# Patient Record
Sex: MALE | Race: WHITE | NOT HISPANIC OR LATINO | Employment: OTHER | URBAN - NONMETROPOLITAN AREA
[De-identification: names, ages, dates, MRNs, and addresses within clinical notes are randomized per-mention and may not be internally consistent; named-entity substitution may affect disease eponyms.]

---

## 2017-01-01 ENCOUNTER — LAB (OUTPATIENT)
Dept: LAB | Facility: HOSPITAL | Age: 77
End: 2017-01-01

## 2017-01-01 ENCOUNTER — HOSPITAL ENCOUNTER (INPATIENT)
Facility: HOSPITAL | Age: 77
LOS: 14 days | End: 2017-03-07
Attending: EMERGENCY MEDICINE | Admitting: INTERNAL MEDICINE

## 2017-01-01 ENCOUNTER — OFFICE VISIT (OUTPATIENT)
Dept: CARDIOLOGY | Facility: CLINIC | Age: 77
End: 2017-01-01

## 2017-01-01 ENCOUNTER — APPOINTMENT (OUTPATIENT)
Dept: GENERAL RADIOLOGY | Facility: HOSPITAL | Age: 77
End: 2017-01-01

## 2017-01-01 ENCOUNTER — APPOINTMENT (OUTPATIENT)
Dept: ULTRASOUND IMAGING | Facility: HOSPITAL | Age: 77
End: 2017-01-01

## 2017-01-01 ENCOUNTER — HOSPITAL ENCOUNTER (INPATIENT)
Facility: HOSPITAL | Age: 77
LOS: 1 days | End: 2017-03-08
Attending: INTERNAL MEDICINE | Admitting: INTERNAL MEDICINE

## 2017-01-01 ENCOUNTER — APPOINTMENT (OUTPATIENT)
Dept: INTERVENTIONAL RADIOLOGY/VASCULAR | Facility: HOSPITAL | Age: 77
End: 2017-01-01
Attending: INTERNAL MEDICINE

## 2017-01-01 ENCOUNTER — ANTICOAGULATION VISIT (OUTPATIENT)
Dept: CARDIOLOGY | Facility: CLINIC | Age: 77
End: 2017-01-01

## 2017-01-01 ENCOUNTER — APPOINTMENT (OUTPATIENT)
Dept: CARDIOLOGY | Facility: HOSPITAL | Age: 77
End: 2017-01-01
Attending: INTERNAL MEDICINE

## 2017-01-01 ENCOUNTER — HOSPITAL ENCOUNTER (OUTPATIENT)
Dept: GENERAL RADIOLOGY | Facility: HOSPITAL | Age: 77
Discharge: HOME | End: 2017-01-30
Admitting: NURSE PRACTITIONER

## 2017-01-01 ENCOUNTER — HOSPITAL ENCOUNTER (OUTPATIENT)
Dept: GENERAL RADIOLOGY | Facility: HOSPITAL | Age: 77
Discharge: HOME | End: 2017-01-30

## 2017-01-01 ENCOUNTER — OFFICE VISIT (OUTPATIENT)
Dept: CARDIAC SURGERY | Facility: CLINIC | Age: 77
End: 2017-01-01

## 2017-01-01 VITALS
WEIGHT: 159 LBS | OXYGEN SATURATION: 92 % | BODY MASS INDEX: 24.1 KG/M2 | TEMPERATURE: 98.6 F | HEART RATE: 65 BPM | DIASTOLIC BLOOD PRESSURE: 72 MMHG | SYSTOLIC BLOOD PRESSURE: 125 MMHG | HEIGHT: 68 IN

## 2017-01-01 VITALS
BODY MASS INDEX: 27 KG/M2 | HEART RATE: 67 BPM | OXYGEN SATURATION: 96 % | DIASTOLIC BLOOD PRESSURE: 76 MMHG | TEMPERATURE: 97.2 F | SYSTOLIC BLOOD PRESSURE: 194 MMHG | WEIGHT: 168 LBS | RESPIRATION RATE: 24 BRPM | HEIGHT: 66 IN

## 2017-01-01 VITALS
WEIGHT: 158 LBS | OXYGEN SATURATION: 92 % | HEART RATE: 89 BPM | DIASTOLIC BLOOD PRESSURE: 72 MMHG | SYSTOLIC BLOOD PRESSURE: 118 MMHG | HEIGHT: 68 IN | BODY MASS INDEX: 23.95 KG/M2

## 2017-01-01 VITALS
TEMPERATURE: 96.9 F | RESPIRATION RATE: 28 BRPM | HEART RATE: 87 BPM | WEIGHT: 171.08 LBS | BODY MASS INDEX: 28.5 KG/M2 | HEIGHT: 65 IN | SYSTOLIC BLOOD PRESSURE: 76 MMHG | OXYGEN SATURATION: 77 % | DIASTOLIC BLOOD PRESSURE: 39 MMHG

## 2017-01-01 DIAGNOSIS — R26.89 IMPAIRED GAIT AND MOBILITY: ICD-10-CM

## 2017-01-01 DIAGNOSIS — N18.30 CONTROLLED TYPE 2 DIABETES MELLITUS WITH STAGE 3 CHRONIC KIDNEY DISEASE, WITHOUT LONG-TERM CURRENT USE OF INSULIN (HCC): ICD-10-CM

## 2017-01-01 DIAGNOSIS — J90 PLEURAL EFFUSION: ICD-10-CM

## 2017-01-01 DIAGNOSIS — Z74.09 IMPAIRED MOBILITY AND ADLS: ICD-10-CM

## 2017-01-01 DIAGNOSIS — R06.09 DYSPNEA ON EXERTION: ICD-10-CM

## 2017-01-01 DIAGNOSIS — E11.22 CKD STAGE 3 DUE TO TYPE 2 DIABETES MELLITUS (HCC): ICD-10-CM

## 2017-01-01 DIAGNOSIS — I48.19 PERSISTENT ATRIAL FIBRILLATION (HCC): Primary | ICD-10-CM

## 2017-01-01 DIAGNOSIS — I50.32 CHRONIC DIASTOLIC CONGESTIVE HEART FAILURE (HCC): ICD-10-CM

## 2017-01-01 DIAGNOSIS — I35.0 NONRHEUMATIC AORTIC VALVE STENOSIS: Primary | ICD-10-CM

## 2017-01-01 DIAGNOSIS — Z86.79 HISTORY OF ATRIAL FIBRILLATION: Primary | ICD-10-CM

## 2017-01-01 DIAGNOSIS — I10 ESSENTIAL HYPERTENSION: ICD-10-CM

## 2017-01-01 DIAGNOSIS — I73.9 PVD (PERIPHERAL VASCULAR DISEASE) (HCC): ICD-10-CM

## 2017-01-01 DIAGNOSIS — E11.22 CONTROLLED TYPE 2 DIABETES MELLITUS WITH STAGE 3 CHRONIC KIDNEY DISEASE, WITHOUT LONG-TERM CURRENT USE OF INSULIN (HCC): ICD-10-CM

## 2017-01-01 DIAGNOSIS — I48.19 PERSISTENT ATRIAL FIBRILLATION (HCC): ICD-10-CM

## 2017-01-01 DIAGNOSIS — Z78.9 IMPAIRED MOBILITY AND ADLS: ICD-10-CM

## 2017-01-01 DIAGNOSIS — I50.9 CONGESTIVE HEART FAILURE, UNSPECIFIED CONGESTIVE HEART FAILURE CHRONICITY, UNSPECIFIED CONGESTIVE HEART FAILURE TYPE: Primary | ICD-10-CM

## 2017-01-01 DIAGNOSIS — I10 BENIGN ESSENTIAL HTN: ICD-10-CM

## 2017-01-01 DIAGNOSIS — J90 UNSPECIFIED PLEURAL EFFUSION: Primary | ICD-10-CM

## 2017-01-01 DIAGNOSIS — Q23.0 CONGENITAL STENOSIS OF AORTIC VALVE: ICD-10-CM

## 2017-01-01 DIAGNOSIS — R00.1 BRADYCARDIA: ICD-10-CM

## 2017-01-01 DIAGNOSIS — Z74.09 IMPAIRED PHYSICAL MOBILITY: ICD-10-CM

## 2017-01-01 DIAGNOSIS — N18.30 CKD STAGE 3 DUE TO TYPE 2 DIABETES MELLITUS (HCC): ICD-10-CM

## 2017-01-01 LAB
ALBUMIN SERPL-MCNC: 3 G/DL (ref 3.4–4.8)
ALBUMIN SERPL-MCNC: 3.4 G/DL (ref 3.4–4.8)
ALBUMIN SERPL-MCNC: 3.5 G/DL (ref 3.4–4.8)
ALBUMIN SERPL-MCNC: 3.6 G/DL (ref 3.4–4.8)
ALBUMIN SERPL-MCNC: 3.6 G/DL (ref 3.4–4.8)
ALBUMIN/GLOB SERPL: 0.9 G/DL (ref 1.1–1.8)
ALBUMIN/GLOB SERPL: 1 G/DL (ref 1.1–1.8)
ALBUMIN/GLOB SERPL: 1.1 G/DL (ref 1.1–1.8)
ALBUMIN/GLOB SERPL: 1.3 G/DL (ref 1.1–1.8)
ALP SERPL-CCNC: 161 U/L (ref 38–126)
ALP SERPL-CCNC: 174 U/L (ref 38–126)
ALP SERPL-CCNC: 94 U/L (ref 38–126)
ALP SERPL-CCNC: 95 U/L (ref 38–126)
ALT SERPL W P-5'-P-CCNC: 44 U/L (ref 21–72)
ALT SERPL W P-5'-P-CCNC: 70 U/L (ref 21–72)
ALT SERPL W P-5'-P-CCNC: 79 U/L (ref 21–72)
ALT SERPL W P-5'-P-CCNC: 97 U/L (ref 21–72)
ANION GAP SERPL CALCULATED.3IONS-SCNC: 10 MMOL/L (ref 5–15)
ANION GAP SERPL CALCULATED.3IONS-SCNC: 11 MMOL/L (ref 5–15)
ANION GAP SERPL CALCULATED.3IONS-SCNC: 12 MMOL/L (ref 5–15)
ANION GAP SERPL CALCULATED.3IONS-SCNC: 6 MMOL/L (ref 5–15)
ANION GAP SERPL CALCULATED.3IONS-SCNC: 7 MMOL/L (ref 5–15)
ANION GAP SERPL CALCULATED.3IONS-SCNC: 8 MMOL/L (ref 5–15)
ANION GAP SERPL CALCULATED.3IONS-SCNC: 9 MMOL/L (ref 5–15)
ARTERIAL PATENCY WRIST A: ABNORMAL
ARTERIAL PATENCY WRIST A: ABNORMAL
AST SERPL-CCNC: 37 U/L (ref 17–59)
AST SERPL-CCNC: 37 U/L (ref 17–59)
AST SERPL-CCNC: 54 U/L (ref 17–59)
AST SERPL-CCNC: 65 U/L (ref 17–59)
ATMOSPHERIC PRESS: ABNORMAL MMHG
ATMOSPHERIC PRESS: ABNORMAL MMHG
BASE EXCESS BLDA CALC-SCNC: -1.5 MMOL/L (ref -2.4–2.4)
BASE EXCESS BLDA CALC-SCNC: 0.1 MMOL/L (ref -2.4–2.4)
BASOPHILS # BLD AUTO: 0 10*3/MM3 (ref 0–0.2)
BASOPHILS # BLD AUTO: 0.01 10*3/MM3 (ref 0–0.2)
BASOPHILS # BLD AUTO: 0.02 10*3/MM3 (ref 0–0.2)
BASOPHILS # BLD AUTO: 0.04 10*3/MM3 (ref 0–0.2)
BASOPHILS NFR BLD AUTO: 0 % (ref 0–2)
BASOPHILS NFR BLD AUTO: 0.2 % (ref 0–2)
BASOPHILS NFR BLD AUTO: 0.4 % (ref 0–2)
BASOPHILS NFR BLD AUTO: 0.7 % (ref 0–2)
BDY SITE: ABNORMAL
BDY SITE: ABNORMAL
BH CV ECHO MEAS - AO MAX PG: 17.4 MMHG
BH CV ECHO MEAS - AO V2 MAX: 208.8 CM/SEC
BH CV ECHO MEAS - RAP SYSTOLE: 15 MMHG
BH CV ECHO MEAS - RVSP: 52.9 MMHG
BH CV ECHO MEAS - TR MAX VEL: 307.8 CM/SEC
BILIRUB SERPL-MCNC: 0.5 MG/DL (ref 0.2–1.3)
BILIRUB SERPL-MCNC: 0.6 MG/DL (ref 0.2–1.3)
BILIRUB SERPL-MCNC: 0.6 MG/DL (ref 0.2–1.3)
BILIRUB SERPL-MCNC: 0.8 MG/DL (ref 0.2–1.3)
BUN BLD-MCNC: 100 MG/DL (ref 7–21)
BUN BLD-MCNC: 101 MG/DL (ref 7–21)
BUN BLD-MCNC: 103 MG/DL (ref 7–21)
BUN BLD-MCNC: 103 MG/DL (ref 7–21)
BUN BLD-MCNC: 33 MG/DL (ref 7–21)
BUN BLD-MCNC: 35 MG/DL (ref 7–21)
BUN BLD-MCNC: 39 MG/DL (ref 7–21)
BUN BLD-MCNC: 58 MG/DL (ref 7–21)
BUN BLD-MCNC: 68 MG/DL (ref 7–21)
BUN BLD-MCNC: 71 MG/DL (ref 7–21)
BUN BLD-MCNC: 79 MG/DL (ref 7–21)
BUN BLD-MCNC: 79 MG/DL (ref 7–21)
BUN BLD-MCNC: 85 MG/DL (ref 7–21)
BUN BLD-MCNC: 88 MG/DL (ref 7–21)
BUN BLD-MCNC: 92 MG/DL (ref 7–21)
BUN/CREAT SERPL: 18.2 (ref 7–25)
BUN/CREAT SERPL: 18.5 (ref 7–25)
BUN/CREAT SERPL: 18.8 (ref 7–25)
BUN/CREAT SERPL: 24.1 (ref 7–25)
BUN/CREAT SERPL: 30.5 (ref 7–25)
BUN/CREAT SERPL: 34 (ref 7–25)
BUN/CREAT SERPL: 39.1 (ref 7–25)
BUN/CREAT SERPL: 39.3 (ref 7–25)
BUN/CREAT SERPL: 41.3 (ref 7–25)
BUN/CREAT SERPL: 41.9 (ref 7–25)
BUN/CREAT SERPL: 44.4 (ref 7–25)
BUN/CREAT SERPL: 45.8 (ref 7–25)
BUN/CREAT SERPL: 46.3 (ref 7–25)
BUN/CREAT SERPL: 46.6 (ref 7–25)
BUN/CREAT SERPL: 47 (ref 7–25)
CA-I BLD-MCNC: 4.7 MG/DL (ref 4.5–4.9)
CA-I BLD-MCNC: 5 MG/DL (ref 4.5–4.9)
CALCIUM SPEC-SCNC: 8.6 MG/DL (ref 8.4–10.2)
CALCIUM SPEC-SCNC: 8.8 MG/DL (ref 8.4–10.2)
CALCIUM SPEC-SCNC: 8.9 MG/DL (ref 8.4–10.2)
CALCIUM SPEC-SCNC: 9 MG/DL (ref 8.4–10.2)
CALCIUM SPEC-SCNC: 9.1 MG/DL (ref 8.4–10.2)
CALCIUM SPEC-SCNC: 9.1 MG/DL (ref 8.4–10.2)
CALCIUM SPEC-SCNC: 9.3 MG/DL (ref 8.4–10.2)
CALCIUM SPEC-SCNC: 9.4 MG/DL (ref 8.4–10.2)
CALCIUM SPEC-SCNC: 9.5 MG/DL (ref 8.4–10.2)
CALCIUM SPEC-SCNC: 9.6 MG/DL (ref 8.4–10.2)
CALCIUM SPEC-SCNC: 9.7 MG/DL (ref 8.4–10.2)
CHLORIDE SERPL-SCNC: 100 MMOL/L (ref 95–110)
CHLORIDE SERPL-SCNC: 101 MMOL/L (ref 95–110)
CHLORIDE SERPL-SCNC: 102 MMOL/L (ref 95–110)
CHLORIDE SERPL-SCNC: 103 MMOL/L (ref 95–110)
CHLORIDE SERPL-SCNC: 103 MMOL/L (ref 95–110)
CHLORIDE SERPL-SCNC: 109 MMOL/L (ref 95–110)
CHLORIDE SERPL-SCNC: 109 MMOL/L (ref 95–110)
CHLORIDE SERPL-SCNC: 98 MMOL/L (ref 95–110)
CHLORIDE SERPL-SCNC: 99 MMOL/L (ref 95–110)
CO2 BLDA-SCNC: 22.7 MMOL/L (ref 23–27)
CO2 BLDA-SCNC: 25.4 MMOL/L (ref 23–27)
CO2 SERPL-SCNC: 23 MMOL/L (ref 22–31)
CO2 SERPL-SCNC: 24 MMOL/L (ref 22–31)
CO2 SERPL-SCNC: 24 MMOL/L (ref 22–31)
CO2 SERPL-SCNC: 25 MMOL/L (ref 22–31)
CO2 SERPL-SCNC: 25 MMOL/L (ref 22–31)
CO2 SERPL-SCNC: 26 MMOL/L (ref 22–31)
CO2 SERPL-SCNC: 27 MMOL/L (ref 22–31)
CO2 SERPL-SCNC: 28 MMOL/L (ref 22–31)
CO2 SERPL-SCNC: 29 MMOL/L (ref 22–31)
CO2 SERPL-SCNC: 29 MMOL/L (ref 22–31)
CREAT BLD-MCNC: 1.76 MG/DL (ref 0.7–1.3)
CREAT BLD-MCNC: 1.92 MG/DL (ref 0.7–1.3)
CREAT BLD-MCNC: 2.01 MG/DL (ref 0.7–1.3)
CREAT BLD-MCNC: 2.02 MG/DL (ref 0.7–1.3)
CREAT BLD-MCNC: 2.06 MG/DL (ref 0.7–1.3)
CREAT BLD-MCNC: 2.07 MG/DL (ref 0.7–1.3)
CREAT BLD-MCNC: 2.09 MG/DL (ref 0.7–1.3)
CREAT BLD-MCNC: 2.1 MG/DL (ref 0.7–1.3)
CREAT BLD-MCNC: 2.11 MG/DL (ref 0.7–1.3)
CREAT BLD-MCNC: 2.15 MG/DL (ref 0.7–1.3)
CREAT BLD-MCNC: 2.16 MG/DL (ref 0.7–1.3)
CREAT BLD-MCNC: 2.21 MG/DL (ref 0.7–1.3)
CREAT BLD-MCNC: 2.23 MG/DL (ref 0.7–1.3)
CREAT BLD-MCNC: 2.25 MG/DL (ref 0.7–1.3)
CREAT BLD-MCNC: 2.41 MG/DL (ref 0.7–1.3)
CREAT UR-MCNC: 39.5 MG/DL
DEPRECATED RDW RBC AUTO: 51.4 FL (ref 35.1–43.9)
DEPRECATED RDW RBC AUTO: 51.7 FL (ref 35.1–43.9)
DEPRECATED RDW RBC AUTO: 52.1 FL (ref 35.1–43.9)
DEPRECATED RDW RBC AUTO: 52.5 FL (ref 35.1–43.9)
DEPRECATED RDW RBC AUTO: 52.6 FL (ref 35.1–43.9)
DEPRECATED RDW RBC AUTO: 52.7 FL (ref 35.1–43.9)
DEPRECATED RDW RBC AUTO: 52.7 FL (ref 35.1–43.9)
DEPRECATED RDW RBC AUTO: 53 FL (ref 35.1–43.9)
DEPRECATED RDW RBC AUTO: 53.2 FL (ref 35.1–43.9)
DEPRECATED RDW RBC AUTO: 54.3 FL (ref 35.1–43.9)
DEPRECATED RDW RBC AUTO: 54.4 FL (ref 35.1–43.9)
DEPRECATED RDW RBC AUTO: 54.5 FL (ref 35.1–43.9)
DEPRECATED RDW RBC AUTO: 55.1 FL (ref 35.1–43.9)
DEPRECATED RDW RBC AUTO: 55.7 FL (ref 35.1–43.9)
EOSINOPHIL # BLD AUTO: 0 10*3/MM3 (ref 0–0.7)
EOSINOPHIL # BLD AUTO: 0.01 10*3/MM3 (ref 0–0.7)
EOSINOPHIL # BLD AUTO: 0.05 10*3/MM3 (ref 0–0.7)
EOSINOPHIL # BLD AUTO: 0.05 10*3/MM3 (ref 0–0.7)
EOSINOPHIL NFR BLD AUTO: 0 % (ref 0–7)
EOSINOPHIL NFR BLD AUTO: 0.1 % (ref 0–7)
EOSINOPHIL NFR BLD AUTO: 0.8 % (ref 0–7)
EOSINOPHIL NFR BLD AUTO: 0.9 % (ref 0–7)
ERYTHROCYTE [DISTWIDTH] IN BLOOD BY AUTOMATED COUNT: 17.3 % (ref 11.5–14.5)
ERYTHROCYTE [DISTWIDTH] IN BLOOD BY AUTOMATED COUNT: 17.3 % (ref 11.5–14.5)
ERYTHROCYTE [DISTWIDTH] IN BLOOD BY AUTOMATED COUNT: 17.4 % (ref 11.5–14.5)
ERYTHROCYTE [DISTWIDTH] IN BLOOD BY AUTOMATED COUNT: 17.4 % (ref 11.5–14.5)
ERYTHROCYTE [DISTWIDTH] IN BLOOD BY AUTOMATED COUNT: 17.5 % (ref 11.5–14.5)
ERYTHROCYTE [DISTWIDTH] IN BLOOD BY AUTOMATED COUNT: 17.6 % (ref 11.5–14.5)
ERYTHROCYTE [DISTWIDTH] IN BLOOD BY AUTOMATED COUNT: 17.7 % (ref 11.5–14.5)
ERYTHROCYTE [DISTWIDTH] IN BLOOD BY AUTOMATED COUNT: 17.8 % (ref 11.5–14.5)
ERYTHROCYTE [DISTWIDTH] IN BLOOD BY AUTOMATED COUNT: 18 % (ref 11.5–14.5)
ERYTHROCYTE [DISTWIDTH] IN BLOOD BY AUTOMATED COUNT: 18.1 % (ref 11.5–14.5)
ERYTHROCYTE [DISTWIDTH] IN BLOOD BY AUTOMATED COUNT: 18.2 % (ref 11.5–14.5)
ERYTHROCYTE [DISTWIDTH] IN BLOOD BY AUTOMATED COUNT: 18.5 % (ref 11.5–14.5)
GFR SERPL CREATININE-BSD FRML MDRD: 26 ML/MIN/1.73 (ref 60–98)
GFR SERPL CREATININE-BSD FRML MDRD: 28 ML/MIN/1.73 (ref 60–98)
GFR SERPL CREATININE-BSD FRML MDRD: 29 ML/MIN/1.73 (ref 42–98)
GFR SERPL CREATININE-BSD FRML MDRD: 29 ML/MIN/1.73 (ref 60–98)
GFR SERPL CREATININE-BSD FRML MDRD: 30 ML/MIN/1.73 (ref 42–98)
GFR SERPL CREATININE-BSD FRML MDRD: 30 ML/MIN/1.73 (ref 60–98)
GFR SERPL CREATININE-BSD FRML MDRD: 31 ML/MIN/1.73 (ref 42–98)
GFR SERPL CREATININE-BSD FRML MDRD: 31 ML/MIN/1.73 (ref 60–98)
GFR SERPL CREATININE-BSD FRML MDRD: 32 ML/MIN/1.73 (ref 60–98)
GFR SERPL CREATININE-BSD FRML MDRD: 32 ML/MIN/1.73 (ref 60–98)
GFR SERPL CREATININE-BSD FRML MDRD: 34 ML/MIN/1.73 (ref 42–98)
GFR SERPL CREATININE-BSD FRML MDRD: 38 ML/MIN/1.73 (ref 42–98)
GLOBULIN UR ELPH-MCNC: 2.7 GM/DL (ref 2.3–3.5)
GLOBULIN UR ELPH-MCNC: 2.8 GM/DL (ref 2.3–3.5)
GLOBULIN UR ELPH-MCNC: 3.5 GM/DL (ref 2.3–3.5)
GLOBULIN UR ELPH-MCNC: 3.8 GM/DL (ref 2.3–3.5)
GLUCOSE BLD-MCNC: 113 MG/DL (ref 60–100)
GLUCOSE BLD-MCNC: 115 MG/DL (ref 60–100)
GLUCOSE BLD-MCNC: 126 MG/DL (ref 60–100)
GLUCOSE BLD-MCNC: 127 MG/DL (ref 60–100)
GLUCOSE BLD-MCNC: 130 MG/DL (ref 60–100)
GLUCOSE BLD-MCNC: 136 MG/DL (ref 60–100)
GLUCOSE BLD-MCNC: 160 MG/DL (ref 60–100)
GLUCOSE BLD-MCNC: 192 MG/DL (ref 60–100)
GLUCOSE BLD-MCNC: 192 MG/DL (ref 60–100)
GLUCOSE BLD-MCNC: 196 MG/DL (ref 60–100)
GLUCOSE BLD-MCNC: 220 MG/DL (ref 60–100)
GLUCOSE BLD-MCNC: 273 MG/DL (ref 60–100)
GLUCOSE BLD-MCNC: 277 MG/DL (ref 60–100)
GLUCOSE BLD-MCNC: 36 MG/DL (ref 60–100)
GLUCOSE BLD-MCNC: 95 MG/DL (ref 60–100)
GLUCOSE BLDA-MCNC: 119 MMOL/L
GLUCOSE BLDA-MCNC: 128 MMOL/L
GLUCOSE BLDC GLUCOMTR-MCNC: 108 MG/DL (ref 70–130)
GLUCOSE BLDC GLUCOMTR-MCNC: 115 MG/DL (ref 70–130)
GLUCOSE BLDC GLUCOMTR-MCNC: 116 MG/DL (ref 70–130)
GLUCOSE BLDC GLUCOMTR-MCNC: 138 MG/DL (ref 70–130)
GLUCOSE BLDC GLUCOMTR-MCNC: 140 MG/DL (ref 70–130)
GLUCOSE BLDC GLUCOMTR-MCNC: 148 MG/DL (ref 70–130)
GLUCOSE BLDC GLUCOMTR-MCNC: 148 MG/DL (ref 70–130)
GLUCOSE BLDC GLUCOMTR-MCNC: 152 MG/DL (ref 70–130)
GLUCOSE BLDC GLUCOMTR-MCNC: 174 MG/DL (ref 70–130)
GLUCOSE BLDC GLUCOMTR-MCNC: 175 MG/DL (ref 70–130)
GLUCOSE BLDC GLUCOMTR-MCNC: 176 MG/DL (ref 70–130)
GLUCOSE BLDC GLUCOMTR-MCNC: 184 MG/DL (ref 70–130)
GLUCOSE BLDC GLUCOMTR-MCNC: 189 MG/DL (ref 70–130)
GLUCOSE BLDC GLUCOMTR-MCNC: 206 MG/DL (ref 70–130)
GLUCOSE BLDC GLUCOMTR-MCNC: 209 MG/DL (ref 70–130)
GLUCOSE BLDC GLUCOMTR-MCNC: 219 MG/DL (ref 70–130)
GLUCOSE BLDC GLUCOMTR-MCNC: 237 MG/DL (ref 70–130)
GLUCOSE BLDC GLUCOMTR-MCNC: 254 MG/DL (ref 70–130)
GLUCOSE BLDC GLUCOMTR-MCNC: 257 MG/DL (ref 70–130)
GLUCOSE BLDC GLUCOMTR-MCNC: 260 MG/DL (ref 70–130)
GLUCOSE BLDC GLUCOMTR-MCNC: 273 MG/DL (ref 70–130)
GLUCOSE BLDC GLUCOMTR-MCNC: 279 MG/DL (ref 70–130)
GLUCOSE BLDC GLUCOMTR-MCNC: 288 MG/DL (ref 70–130)
GLUCOSE BLDC GLUCOMTR-MCNC: 290 MG/DL (ref 70–130)
GLUCOSE BLDC GLUCOMTR-MCNC: 292 MG/DL (ref 70–130)
GLUCOSE BLDC GLUCOMTR-MCNC: 293 MG/DL (ref 70–130)
GLUCOSE BLDC GLUCOMTR-MCNC: 293 MG/DL (ref 70–130)
GLUCOSE BLDC GLUCOMTR-MCNC: 297 MG/DL (ref 70–130)
GLUCOSE BLDC GLUCOMTR-MCNC: 303 MG/DL (ref 70–130)
GLUCOSE BLDC GLUCOMTR-MCNC: 305 MG/DL (ref 70–130)
GLUCOSE BLDC GLUCOMTR-MCNC: 307 MG/DL (ref 70–130)
GLUCOSE BLDC GLUCOMTR-MCNC: 313 MG/DL (ref 70–130)
GLUCOSE BLDC GLUCOMTR-MCNC: 316 MG/DL (ref 70–130)
GLUCOSE BLDC GLUCOMTR-MCNC: 322 MG/DL (ref 70–130)
GLUCOSE BLDC GLUCOMTR-MCNC: 327 MG/DL (ref 70–130)
GLUCOSE BLDC GLUCOMTR-MCNC: 330 MG/DL (ref 70–130)
GLUCOSE BLDC GLUCOMTR-MCNC: 336 MG/DL (ref 70–130)
GLUCOSE BLDC GLUCOMTR-MCNC: 339 MG/DL (ref 70–130)
GLUCOSE BLDC GLUCOMTR-MCNC: 340 MG/DL (ref 70–130)
GLUCOSE BLDC GLUCOMTR-MCNC: 345 MG/DL (ref 70–130)
GLUCOSE BLDC GLUCOMTR-MCNC: 352 MG/DL (ref 70–130)
GLUCOSE BLDC GLUCOMTR-MCNC: 359 MG/DL (ref 70–130)
GLUCOSE BLDC GLUCOMTR-MCNC: 376 MG/DL (ref 70–130)
GLUCOSE BLDC GLUCOMTR-MCNC: 378 MG/DL (ref 70–130)
GLUCOSE BLDC GLUCOMTR-MCNC: 381 MG/DL (ref 70–130)
GLUCOSE BLDC GLUCOMTR-MCNC: 411 MG/DL (ref 70–130)
GLUCOSE BLDC GLUCOMTR-MCNC: 415 MG/DL (ref 70–130)
GLUCOSE BLDC GLUCOMTR-MCNC: 415 MG/DL (ref 70–130)
GLUCOSE BLDC GLUCOMTR-MCNC: 465 MG/DL (ref 70–130)
GLUCOSE BLDC GLUCOMTR-MCNC: 65 MG/DL (ref 70–130)
GLUCOSE BLDC GLUCOMTR-MCNC: 91 MG/DL (ref 70–130)
GLUCOSE BLDC GLUCOMTR-MCNC: 94 MG/DL (ref 70–130)
HANSEL STAIN: NEGATIVE
HCO3 BLDA-SCNC: 21.7 MMOL/L (ref 22–26)
HCO3 BLDA-SCNC: 24.2 MMOL/L (ref 22–26)
HCT VFR BLD AUTO: 33.3 % (ref 39–49)
HCT VFR BLD AUTO: 34.1 % (ref 39–49)
HCT VFR BLD AUTO: 34.3 % (ref 39–49)
HCT VFR BLD AUTO: 34.7 % (ref 39–49)
HCT VFR BLD AUTO: 34.8 % (ref 39–49)
HCT VFR BLD AUTO: 35.2 % (ref 39–49)
HCT VFR BLD AUTO: 35.3 % (ref 39–49)
HCT VFR BLD AUTO: 35.9 % (ref 39–49)
HCT VFR BLD AUTO: 36 % (ref 39–49)
HCT VFR BLD AUTO: 36.1 % (ref 39–49)
HCT VFR BLD AUTO: 36.4 % (ref 39–49)
HCT VFR BLD AUTO: 36.5 % (ref 39–49)
HCT VFR BLD AUTO: 38 % (ref 39–49)
HCT VFR BLD AUTO: 38.4 % (ref 39–49)
HCT VFR BLD CALC: 38 % (ref 40–54)
HCT VFR BLD CALC: 41 % (ref 40–54)
HGB BLD-MCNC: 11.3 G/DL (ref 13.7–17.3)
HGB BLD-MCNC: 11.5 G/DL (ref 13.7–17.3)
HGB BLD-MCNC: 11.6 G/DL (ref 13.7–17.3)
HGB BLD-MCNC: 11.7 G/DL (ref 13.7–17.3)
HGB BLD-MCNC: 11.9 G/DL (ref 13.7–17.3)
HGB BLD-MCNC: 12 G/DL (ref 13.7–17.3)
HGB BLD-MCNC: 12 G/DL (ref 13.7–17.3)
HGB BLD-MCNC: 12.1 G/DL (ref 13.7–17.3)
HGB BLD-MCNC: 12.1 G/DL (ref 13.7–17.3)
HGB BLD-MCNC: 12.4 G/DL (ref 13.7–17.3)
HGB BLD-MCNC: 12.5 G/DL (ref 13.7–17.3)
HGB BLD-MCNC: 12.5 G/DL (ref 13.7–17.3)
HGB BLD-MCNC: 12.6 G/DL (ref 13.7–17.3)
HGB BLD-MCNC: 12.7 G/DL (ref 13.7–17.3)
HGB BLDA-MCNC: 12.9 G/DL (ref 14–18)
HGB BLDA-MCNC: 14 G/DL (ref 14–18)
HOLD SPECIMEN: NORMAL
HOLD SPECIMEN: NORMAL
IMM GRANULOCYTES # BLD: 0.01 10*3/MM3 (ref 0–0.02)
IMM GRANULOCYTES # BLD: 0.02 10*3/MM3 (ref 0–0.02)
IMM GRANULOCYTES # BLD: 0.03 10*3/MM3 (ref 0–0.02)
IMM GRANULOCYTES # BLD: 0.03 10*3/MM3 (ref 0–0.02)
IMM GRANULOCYTES NFR BLD: 0.1 % (ref 0–0.5)
IMM GRANULOCYTES NFR BLD: 0.2 % (ref 0–0.5)
IMM GRANULOCYTES NFR BLD: 0.3 % (ref 0–0.5)
IMM GRANULOCYTES NFR BLD: 0.3 % (ref 0–0.5)
INR PPP: 1.5 (ref 0.8–1.2)
INR PPP: 1.77 (ref 0.8–1.2)
INR PPP: 1.78 (ref 0.8–1.2)
INR PPP: 1.96 (ref 0.8–1.2)
INR PPP: 2 (ref 0.8–1.2)
INR PPP: 2.15 (ref 0.8–1.2)
INR PPP: 2.28 (ref 0.8–1.2)
INR PPP: 2.29 (ref 0.8–1.2)
INR PPP: 2.41 (ref 0.8–1.2)
INR PPP: 2.6 (ref 0.8–1.2)
INR PPP: 2.8 (ref 0.8–1.2)
INR PPP: 2.87 (ref 0.8–1.2)
INR PPP: 3.36 (ref 0.8–1.2)
INR PPP: 3.37 (ref 0.8–1.2)
INR PPP: 3.5
INR PPP: 3.5
INR PPP: 3.76 (ref 0.8–1.2)
INR PPP: 4.08 (ref 0.8–1.2)
INR PPP: 4.77 (ref 0.8–1.2)
INR PPP: 6.83 (ref 0.8–1.2)
LARGE PLATELETS: NORMAL
LV EF 2D ECHO EST: 55 %
LYMPHOCYTES # BLD AUTO: 0.25 10*3/MM3 (ref 0.6–4.2)
LYMPHOCYTES # BLD AUTO: 0.29 10*3/MM3 (ref 0.6–4.2)
LYMPHOCYTES # BLD AUTO: 0.32 10*3/MM3 (ref 0.6–4.2)
LYMPHOCYTES # BLD AUTO: 0.32 10*3/MM3 (ref 0.6–4.2)
LYMPHOCYTES # BLD AUTO: 0.34 10*3/MM3 (ref 0.6–4.2)
LYMPHOCYTES # BLD AUTO: 0.36 10*3/MM3 (ref 0.6–4.2)
LYMPHOCYTES # BLD AUTO: 0.37 10*3/MM3 (ref 0.6–4.2)
LYMPHOCYTES # BLD AUTO: 0.44 10*3/MM3 (ref 0.6–4.2)
LYMPHOCYTES # BLD AUTO: 0.46 10*3/MM3 (ref 0.6–4.2)
LYMPHOCYTES # BLD AUTO: 0.5 10*3/MM3 (ref 0.6–4.2)
LYMPHOCYTES # BLD AUTO: 0.6 10*3/MM3 (ref 0.6–4.2)
LYMPHOCYTES # BLD AUTO: 0.78 10*3/MM3 (ref 0.6–4.2)
LYMPHOCYTES # BLD AUTO: 0.81 10*3/MM3 (ref 0.6–4.2)
LYMPHOCYTES NFR BLD AUTO: 13.3 % (ref 10–50)
LYMPHOCYTES NFR BLD AUTO: 13.9 % (ref 10–50)
LYMPHOCYTES NFR BLD AUTO: 2.7 % (ref 10–50)
LYMPHOCYTES NFR BLD AUTO: 3 % (ref 10–50)
LYMPHOCYTES NFR BLD AUTO: 3.2 % (ref 10–50)
LYMPHOCYTES NFR BLD AUTO: 3.3 % (ref 10–50)
LYMPHOCYTES NFR BLD AUTO: 4.1 % (ref 10–50)
LYMPHOCYTES NFR BLD AUTO: 4.3 % (ref 10–50)
LYMPHOCYTES NFR BLD AUTO: 4.9 % (ref 10–50)
LYMPHOCYTES NFR BLD AUTO: 5.4 % (ref 10–50)
LYMPHOCYTES NFR BLD AUTO: 6.2 % (ref 10–50)
LYMPHOCYTES NFR BLD AUTO: 6.5 % (ref 10–50)
LYMPHOCYTES NFR BLD AUTO: 8.2 % (ref 10–50)
MAGNESIUM SERPL-MCNC: 2.5 MG/DL (ref 1.6–2.3)
MAGNESIUM SERPL-MCNC: 2.5 MG/DL (ref 1.6–2.3)
MAGNESIUM SERPL-MCNC: 2.6 MG/DL (ref 1.6–2.3)
MAGNESIUM SERPL-MCNC: 2.7 MG/DL (ref 1.6–2.3)
MCH RBC QN AUTO: 27.2 PG (ref 26.5–34)
MCH RBC QN AUTO: 27.4 PG (ref 26.5–34)
MCH RBC QN AUTO: 27.5 PG (ref 26.5–34)
MCH RBC QN AUTO: 27.6 PG (ref 26.5–34)
MCH RBC QN AUTO: 27.6 PG (ref 26.5–34)
MCH RBC QN AUTO: 27.7 PG (ref 26.5–34)
MCH RBC QN AUTO: 27.7 PG (ref 26.5–34)
MCH RBC QN AUTO: 27.8 PG (ref 26.5–34)
MCH RBC QN AUTO: 27.9 PG (ref 26.5–34)
MCH RBC QN AUTO: 27.9 PG (ref 26.5–34)
MCH RBC QN AUTO: 28 PG (ref 26.5–34)
MCH RBC QN AUTO: 28.3 PG (ref 26.5–34)
MCHC RBC AUTO-ENTMCNC: 33.1 G/DL (ref 31.5–36.3)
MCHC RBC AUTO-ENTMCNC: 33.2 G/DL (ref 31.5–36.3)
MCHC RBC AUTO-ENTMCNC: 33.4 G/DL (ref 31.5–36.3)
MCHC RBC AUTO-ENTMCNC: 33.6 G/DL (ref 31.5–36.3)
MCHC RBC AUTO-ENTMCNC: 33.7 G/DL (ref 31.5–36.3)
MCHC RBC AUTO-ENTMCNC: 33.9 G/DL (ref 31.5–36.3)
MCHC RBC AUTO-ENTMCNC: 34.1 G/DL (ref 31.5–36.3)
MCHC RBC AUTO-ENTMCNC: 34.1 G/DL (ref 31.5–36.3)
MCHC RBC AUTO-ENTMCNC: 34.2 G/DL (ref 31.5–36.3)
MCHC RBC AUTO-ENTMCNC: 34.3 G/DL (ref 31.5–36.3)
MCHC RBC AUTO-ENTMCNC: 34.3 G/DL (ref 31.5–36.3)
MCHC RBC AUTO-ENTMCNC: 34.5 G/DL (ref 31.5–36.3)
MCV RBC AUTO: 80.9 FL (ref 80–98)
MCV RBC AUTO: 81.1 FL (ref 80–98)
MCV RBC AUTO: 81.1 FL (ref 80–98)
MCV RBC AUTO: 81.3 FL (ref 80–98)
MCV RBC AUTO: 81.4 FL (ref 80–98)
MCV RBC AUTO: 81.5 FL (ref 80–98)
MCV RBC AUTO: 81.5 FL (ref 80–98)
MCV RBC AUTO: 81.6 FL (ref 80–98)
MCV RBC AUTO: 81.8 FL (ref 80–98)
MCV RBC AUTO: 82.4 FL (ref 80–98)
MCV RBC AUTO: 82.6 FL (ref 80–98)
MCV RBC AUTO: 82.7 FL (ref 80–98)
MCV RBC AUTO: 83.3 FL (ref 80–98)
MCV RBC AUTO: 83.5 FL (ref 80–98)
MODALITY: ABNORMAL
MODALITY: ABNORMAL
MONOCYTES # BLD AUTO: 0.02 10*3/MM3 (ref 0–0.9)
MONOCYTES # BLD AUTO: 0.05 10*3/MM3 (ref 0–0.9)
MONOCYTES # BLD AUTO: 0.12 10*3/MM3 (ref 0–0.9)
MONOCYTES # BLD AUTO: 0.19 10*3/MM3 (ref 0–0.9)
MONOCYTES # BLD AUTO: 0.22 10*3/MM3 (ref 0–0.9)
MONOCYTES # BLD AUTO: 0.25 10*3/MM3 (ref 0–0.9)
MONOCYTES # BLD AUTO: 0.3 10*3/MM3 (ref 0–0.9)
MONOCYTES # BLD AUTO: 0.31 10*3/MM3 (ref 0–0.9)
MONOCYTES # BLD AUTO: 0.34 10*3/MM3 (ref 0–0.9)
MONOCYTES # BLD AUTO: 0.39 10*3/MM3 (ref 0–0.9)
MONOCYTES # BLD AUTO: 0.41 10*3/MM3 (ref 0–0.9)
MONOCYTES # BLD AUTO: 0.54 10*3/MM3 (ref 0–0.9)
MONOCYTES # BLD AUTO: 0.63 10*3/MM3 (ref 0–0.9)
MONOCYTES NFR BLD AUTO: 0.2 % (ref 0–12)
MONOCYTES NFR BLD AUTO: 1.1 % (ref 0–12)
MONOCYTES NFR BLD AUTO: 1.2 % (ref 0–12)
MONOCYTES NFR BLD AUTO: 2.7 % (ref 0–12)
MONOCYTES NFR BLD AUTO: 2.8 % (ref 0–12)
MONOCYTES NFR BLD AUTO: 2.8 % (ref 0–12)
MONOCYTES NFR BLD AUTO: 3.2 % (ref 0–12)
MONOCYTES NFR BLD AUTO: 3.5 % (ref 0–12)
MONOCYTES NFR BLD AUTO: 3.7 % (ref 0–12)
MONOCYTES NFR BLD AUTO: 4 % (ref 0–12)
MONOCYTES NFR BLD AUTO: 6.3 % (ref 0–12)
MONOCYTES NFR BLD AUTO: 6.7 % (ref 0–12)
MONOCYTES NFR BLD AUTO: 9.6 % (ref 0–12)
NEUTROPHILS # BLD AUTO: 4.06 10*3/MM3 (ref 2–8.6)
NEUTROPHILS # BLD AUTO: 4.21 10*3/MM3 (ref 2–8.6)
NEUTROPHILS # BLD AUTO: 4.77 10*3/MM3 (ref 2–8.6)
NEUTROPHILS # BLD AUTO: 6.39 10*3/MM3 (ref 2–8.6)
NEUTROPHILS # BLD AUTO: 7.25 10*3/MM3 (ref 2–8.6)
NEUTROPHILS # BLD AUTO: 8.16 10*3/MM3 (ref 2–8.6)
NEUTROPHILS # BLD AUTO: 8.19 10*3/MM3 (ref 2–8.6)
NEUTROPHILS # BLD AUTO: 8.49 10*3/MM3 (ref 2–8.6)
NEUTROPHILS # BLD AUTO: 8.54 10*3/MM3 (ref 2–8.6)
NEUTROPHILS # BLD AUTO: 9.02 10*3/MM3 (ref 2–8.6)
NEUTROPHILS # BLD AUTO: 9.1 10*3/MM3 (ref 2–8.6)
NEUTROPHILS # BLD AUTO: 9.1 10*3/MM3 (ref 2–8.6)
NEUTROPHILS # BLD AUTO: 9.37 10*3/MM3 (ref 2–8.6)
NEUTROPHILS NFR BLD AUTO: 75 % (ref 37–80)
NEUTROPHILS NFR BLD AUTO: 78.3 % (ref 37–80)
NEUTROPHILS NFR BLD AUTO: 90.3 % (ref 37–80)
NEUTROPHILS NFR BLD AUTO: 90.3 % (ref 37–80)
NEUTROPHILS NFR BLD AUTO: 90.7 % (ref 37–80)
NEUTROPHILS NFR BLD AUTO: 91 % (ref 37–80)
NEUTROPHILS NFR BLD AUTO: 91.4 % (ref 37–80)
NEUTROPHILS NFR BLD AUTO: 92.7 % (ref 37–80)
NEUTROPHILS NFR BLD AUTO: 92.8 % (ref 37–80)
NEUTROPHILS NFR BLD AUTO: 92.9 % (ref 37–80)
NEUTROPHILS NFR BLD AUTO: 93.4 % (ref 37–80)
NEUTROPHILS NFR BLD AUTO: 93.5 % (ref 37–80)
NEUTROPHILS NFR BLD AUTO: 95.3 % (ref 37–80)
NRBC BLD MANUAL-RTO: 0 /100 WBC (ref 0–0)
NT-PROBNP SERPL-MCNC: 7740 PG/ML (ref 0–1800)
PCO2 BLDA: 32.3 MM HG (ref 35–45)
PCO2 BLDA: 37.4 MM HG (ref 35–45)
PH BLDA: 7.43 PH UNITS (ref 7.35–7.45)
PH BLDA: 7.45 PH UNITS (ref 7.35–7.45)
PHOSPHATE SERPL-MCNC: 4.9 MG/DL (ref 2.4–4.4)
PLATELET # BLD AUTO: 152 10*3/MM3 (ref 150–450)
PLATELET # BLD AUTO: 159 10*3/MM3 (ref 150–450)
PLATELET # BLD AUTO: 171 10*3/MM3 (ref 150–450)
PLATELET # BLD AUTO: 171 10*3/MM3 (ref 150–450)
PLATELET # BLD AUTO: 188 10*3/MM3 (ref 150–450)
PLATELET # BLD AUTO: 192 10*3/MM3 (ref 150–450)
PLATELET # BLD AUTO: 193 10*3/MM3 (ref 150–450)
PLATELET # BLD AUTO: 201 10*3/MM3 (ref 150–450)
PLATELET # BLD AUTO: 205 10*3/MM3 (ref 150–450)
PLATELET # BLD AUTO: 207 10*3/MM3 (ref 150–450)
PLATELET # BLD AUTO: 207 10*3/MM3 (ref 150–450)
PLATELET # BLD AUTO: 219 10*3/MM3 (ref 150–450)
PLATELET # BLD AUTO: 220 10*3/MM3 (ref 150–450)
PLATELET # BLD AUTO: 226 10*3/MM3 (ref 150–450)
PMV BLD AUTO: 10 FL (ref 8–12)
PMV BLD AUTO: 10.1 FL (ref 8–12)
PMV BLD AUTO: 10.2 FL (ref 8–12)
PMV BLD AUTO: 10.2 FL (ref 8–12)
PMV BLD AUTO: 10.3 FL (ref 8–12)
PMV BLD AUTO: 10.3 FL (ref 8–12)
PMV BLD AUTO: 10.6 FL (ref 8–12)
PMV BLD AUTO: 10.7 FL (ref 8–12)
PMV BLD AUTO: 10.9 FL (ref 8–12)
PMV BLD AUTO: 11 FL (ref 8–12)
PMV BLD AUTO: 11.3 FL (ref 8–12)
PMV BLD AUTO: 9.6 FL (ref 8–12)
PMV BLD AUTO: 9.7 FL (ref 8–12)
PMV BLD AUTO: 9.8 FL (ref 8–12)
PO2 BLDA: 181.4 MM HG (ref 80–105)
PO2 BLDA: 60.3 MM HG (ref 80–105)
POTASSIUM BLD-SCNC: 3 MMOL/L (ref 3.5–5.1)
POTASSIUM BLD-SCNC: 3.5 MMOL/L (ref 3.5–5.1)
POTASSIUM BLD-SCNC: 3.6 MMOL/L (ref 3.5–5.1)
POTASSIUM BLD-SCNC: 3.6 MMOL/L (ref 3.5–5.1)
POTASSIUM BLD-SCNC: 3.7 MMOL/L (ref 3.5–5.1)
POTASSIUM BLD-SCNC: 3.8 MMOL/L (ref 3.5–5.1)
POTASSIUM BLD-SCNC: 3.9 MMOL/L (ref 3.5–5.1)
POTASSIUM BLD-SCNC: 4 MMOL/L (ref 3.5–5.1)
POTASSIUM BLD-SCNC: 4.1 MMOL/L (ref 3.5–5.1)
POTASSIUM BLD-SCNC: 4.1 MMOL/L (ref 3.5–5.1)
POTASSIUM BLD-SCNC: 4.8 MMOL/L (ref 3.5–5.1)
POTASSIUM BLDA-SCNC: 3.65 MMOL/L (ref 3.6–4.9)
POTASSIUM BLDA-SCNC: 3.75 MMOL/L (ref 3.6–4.9)
PROT SERPL-MCNC: 5.8 G/DL (ref 6.3–8.6)
PROT SERPL-MCNC: 6.1 G/DL (ref 6.3–8.6)
PROT SERPL-MCNC: 7 G/DL (ref 6.3–8.6)
PROT SERPL-MCNC: 7.4 G/DL (ref 6.3–8.6)
PROTHROMBIN TIME: 20.3 SECONDS (ref 11.1–15.3)
PROTHROMBIN TIME: 20.4 SECONDS (ref 11.1–15.3)
PROTHROMBIN TIME: 21.9 SECONDS (ref 11.1–15.3)
PROTHROMBIN TIME: 22.2 SECONDS (ref 11.1–15.3)
PROTHROMBIN TIME: 23.5 SECONDS (ref 11.1–15.3)
PROTHROMBIN TIME: 24.5 SECONDS (ref 11.1–15.3)
PROTHROMBIN TIME: 24.6 SECONDS (ref 11.1–15.3)
PROTHROMBIN TIME: 25.6 SECONDS (ref 11.1–15.3)
PROTHROMBIN TIME: 27.1 SECONDS (ref 11.1–15.3)
PROTHROMBIN TIME: 28.6 SECONDS (ref 11.1–15.3)
PROTHROMBIN TIME: 29.2 SECONDS (ref 11.1–15.3)
PROTHROMBIN TIME: 32.8 SECONDS (ref 11.1–15.3)
PROTHROMBIN TIME: 32.9 SECONDS (ref 11.1–15.3)
PROTHROMBIN TIME: 35.7 SECONDS (ref 11.1–15.3)
PROTHROMBIN TIME: 38 SECONDS (ref 11.1–15.3)
PROTHROMBIN TIME: 42.7 SECONDS (ref 11.1–15.3)
PROTHROMBIN TIME: 56 SECONDS (ref 11.1–15.3)
PROTHROMBIN TIME: ABNORMAL SECONDS (ref 11.1–15.3)
RBC # BLD AUTO: 4.07 10*6/MM3 (ref 4.37–5.74)
RBC # BLD AUTO: 4.18 10*6/MM3 (ref 4.37–5.74)
RBC # BLD AUTO: 4.23 10*6/MM3 (ref 4.37–5.74)
RBC # BLD AUTO: 4.26 10*6/MM3 (ref 4.37–5.74)
RBC # BLD AUTO: 4.27 10*6/MM3 (ref 4.37–5.74)
RBC # BLD AUTO: 4.29 10*6/MM3 (ref 4.37–5.74)
RBC # BLD AUTO: 4.32 10*6/MM3 (ref 4.37–5.74)
RBC # BLD AUTO: 4.36 10*6/MM3 (ref 4.37–5.74)
RBC # BLD AUTO: 4.41 10*6/MM3 (ref 4.37–5.74)
RBC # BLD AUTO: 4.42 10*6/MM3 (ref 4.37–5.74)
RBC # BLD AUTO: 4.44 10*6/MM3 (ref 4.37–5.74)
RBC # BLD AUTO: 4.51 10*6/MM3 (ref 4.37–5.74)
RBC # BLD AUTO: 4.56 10*6/MM3 (ref 4.37–5.74)
RBC # BLD AUTO: 4.6 10*6/MM3 (ref 4.37–5.74)
RBC MORPH BLD: NORMAL
SAO2 % BLDCOA: 91.1 % (ref 94–100)
SAO2 % BLDCOA: 99.2 %
SODIUM BLD-SCNC: 133 MMOL/L (ref 137–145)
SODIUM BLD-SCNC: 134 MMOL/L (ref 137–145)
SODIUM BLD-SCNC: 135 MMOL/L (ref 137–145)
SODIUM BLD-SCNC: 136 MMOL/L (ref 137–145)
SODIUM BLD-SCNC: 136 MMOL/L (ref 137–145)
SODIUM BLD-SCNC: 137 MMOL/L (ref 137–145)
SODIUM BLD-SCNC: 138 MMOL/L (ref 137–145)
SODIUM BLD-SCNC: 139 MMOL/L (ref 137–145)
SODIUM BLD-SCNC: 139 MMOL/L (ref 137–145)
SODIUM BLD-SCNC: 142 MMOL/L (ref 137–145)
SODIUM BLD-SCNC: 144 MMOL/L (ref 137–145)
SODIUM BLDA-SCNC: 135.4 MMOL/L (ref 138–146)
SODIUM BLDA-SCNC: 142.1 MMOL/L (ref 138–146)
SODIUM UR-SCNC: 41 MMOL/L (ref 30–90)
TROPONIN I SERPL-MCNC: 0.08 NG/ML
UUN 24H UR-MCNC: 487 MG/DL
WBC MORPH BLD: NORMAL
WBC NRBC COR # BLD: 4.5 10*3/MM3 (ref 3.2–9.8)
WBC NRBC COR # BLD: 5.61 10*3/MM3 (ref 3.2–9.8)
WBC NRBC COR # BLD: 6.09 10*3/MM3 (ref 3.2–9.8)
WBC NRBC COR # BLD: 7.05 10*3/MM3 (ref 3.2–9.8)
WBC NRBC COR # BLD: 7.33 10*3/MM3 (ref 3.2–9.8)
WBC NRBC COR # BLD: 7.82 10*3/MM3 (ref 3.2–9.8)
WBC NRBC COR # BLD: 8.79 10*3/MM3 (ref 3.2–9.8)
WBC NRBC COR # BLD: 8.96 10*3/MM3 (ref 3.2–9.8)
WBC NRBC COR # BLD: 9.15 10*3/MM3 (ref 3.2–9.8)
WBC NRBC COR # BLD: 9.33 10*3/MM3 (ref 3.2–9.8)
WBC NRBC COR # BLD: 9.73 10*3/MM3 (ref 3.2–9.8)
WBC NRBC COR # BLD: 9.81 10*3/MM3 (ref 3.2–9.8)
WBC NRBC COR # BLD: 9.83 10*3/MM3 (ref 3.2–9.8)
WBC NRBC COR # BLD: 9.99 10*3/MM3 (ref 3.2–9.8)
WHOLE BLOOD HOLD SPECIMEN: NORMAL
WHOLE BLOOD HOLD SPECIMEN: NORMAL

## 2017-01-01 PROCEDURE — 97110 THERAPEUTIC EXERCISES: CPT

## 2017-01-01 PROCEDURE — 94760 N-INVAS EAR/PLS OXIMETRY 1: CPT

## 2017-01-01 PROCEDURE — 25010000002 MORPHINE PER 10 MG: Performed by: INTERNAL MEDICINE

## 2017-01-01 PROCEDURE — 82962 GLUCOSE BLOOD TEST: CPT

## 2017-01-01 PROCEDURE — 94799 UNLISTED PULMONARY SVC/PX: CPT

## 2017-01-01 PROCEDURE — 99214 OFFICE O/P EST MOD 30 MIN: CPT | Performed by: INTERNAL MEDICINE

## 2017-01-01 PROCEDURE — 25010000002 FUROSEMIDE PER 20 MG: Performed by: INTERNAL MEDICINE

## 2017-01-01 PROCEDURE — 63710000001 INSULIN ASPART PER 5 UNITS: Performed by: INTERNAL MEDICINE

## 2017-01-01 PROCEDURE — 80048 BASIC METABOLIC PNL TOTAL CA: CPT | Performed by: INTERNAL MEDICINE

## 2017-01-01 PROCEDURE — 93308 TTE F-UP OR LMTD: CPT | Performed by: INTERNAL MEDICINE

## 2017-01-01 PROCEDURE — 71035 HC CHEST BILATERAL DECUBITUS: CPT

## 2017-01-01 PROCEDURE — 84540 ASSAY OF URINE/UREA-N: CPT | Performed by: INTERNAL MEDICINE

## 2017-01-01 PROCEDURE — 25010000002 ALBUMIN HUMAN 25% PER 50 ML: Performed by: INTERNAL MEDICINE

## 2017-01-01 PROCEDURE — 25010000002 LORAZEPAM PER 2 MG: Performed by: HOSPITALIST

## 2017-01-01 PROCEDURE — G8987 SELF CARE CURRENT STATUS: HCPCS

## 2017-01-01 PROCEDURE — 93308 TTE F-UP OR LMTD: CPT

## 2017-01-01 PROCEDURE — 25010000002 METHYLPREDNISOLONE PER 40 MG: Performed by: INTERNAL MEDICINE

## 2017-01-01 PROCEDURE — 80053 COMPREHEN METABOLIC PANEL: CPT | Performed by: INTERNAL MEDICINE

## 2017-01-01 PROCEDURE — 99231 SBSQ HOSP IP/OBS SF/LOW 25: CPT | Performed by: NURSE PRACTITIONER

## 2017-01-01 PROCEDURE — 85025 COMPLETE CBC W/AUTO DIFF WBC: CPT | Performed by: INTERNAL MEDICINE

## 2017-01-01 PROCEDURE — 85610 PROTHROMBIN TIME: CPT | Performed by: INTERNAL MEDICINE

## 2017-01-01 PROCEDURE — 80069 RENAL FUNCTION PANEL: CPT | Performed by: PHYSICIAN ASSISTANT

## 2017-01-01 PROCEDURE — 94660 CPAP INITIATION&MGMT: CPT

## 2017-01-01 PROCEDURE — 99232 SBSQ HOSP IP/OBS MODERATE 35: CPT | Performed by: INTERNAL MEDICINE

## 2017-01-01 PROCEDURE — 71020 HC CHEST PA AND LATERAL: CPT

## 2017-01-01 PROCEDURE — 84300 ASSAY OF URINE SODIUM: CPT | Performed by: INTERNAL MEDICINE

## 2017-01-01 PROCEDURE — 99233 SBSQ HOSP IP/OBS HIGH 50: CPT | Performed by: INTERNAL MEDICINE

## 2017-01-01 PROCEDURE — 02H633Z INSERTION OF INFUSION DEVICE INTO RIGHT ATRIUM, PERCUTANEOUS APPROACH: ICD-10-PCS | Performed by: RADIOLOGY

## 2017-01-01 PROCEDURE — 97530 THERAPEUTIC ACTIVITIES: CPT

## 2017-01-01 PROCEDURE — 97167 OT EVAL HIGH COMPLEX 60 MIN: CPT

## 2017-01-01 PROCEDURE — 25010000002 FUROSEMIDE PER 20 MG: Performed by: EMERGENCY MEDICINE

## 2017-01-01 PROCEDURE — 36415 COLL VENOUS BLD VENIPUNCTURE: CPT

## 2017-01-01 PROCEDURE — 97535 SELF CARE MNGMENT TRAINING: CPT

## 2017-01-01 PROCEDURE — 83735 ASSAY OF MAGNESIUM: CPT | Performed by: INTERNAL MEDICINE

## 2017-01-01 PROCEDURE — 84484 ASSAY OF TROPONIN QUANT: CPT | Performed by: INTERNAL MEDICINE

## 2017-01-01 PROCEDURE — 93321 DOPPLER ECHO F-UP/LMTD STD: CPT

## 2017-01-01 PROCEDURE — P9046 ALBUMIN (HUMAN), 25%, 20 ML: HCPCS | Performed by: INTERNAL MEDICINE

## 2017-01-01 PROCEDURE — 84484 ASSAY OF TROPONIN QUANT: CPT | Performed by: EMERGENCY MEDICINE

## 2017-01-01 PROCEDURE — 76937 US GUIDE VASCULAR ACCESS: CPT

## 2017-01-01 PROCEDURE — 85025 COMPLETE CBC W/AUTO DIFF WBC: CPT | Performed by: EMERGENCY MEDICINE

## 2017-01-01 PROCEDURE — 63710000001 INSULIN DETEMIR PER 5 UNITS: Performed by: INTERNAL MEDICINE

## 2017-01-01 PROCEDURE — 99285 EMERGENCY DEPT VISIT HI MDM: CPT

## 2017-01-01 PROCEDURE — B244ZZZ ULTRASONOGRAPHY OF RIGHT HEART: ICD-10-PCS | Performed by: RADIOLOGY

## 2017-01-01 PROCEDURE — 82803 BLOOD GASES ANY COMBINATION: CPT | Performed by: EMERGENCY MEDICINE

## 2017-01-01 PROCEDURE — 25010000002 LORAZEPAM PER 2 MG: Performed by: INTERNAL MEDICINE

## 2017-01-01 PROCEDURE — 63710000001 INSULIN DETEMIR PER 5 UNITS: Performed by: FAMILY MEDICINE

## 2017-01-01 PROCEDURE — 93325 DOPPLER ECHO COLOR FLOW MAPG: CPT

## 2017-01-01 PROCEDURE — 71010 HC CHEST PA OR AP: CPT

## 2017-01-01 PROCEDURE — 80048 BASIC METABOLIC PNL TOTAL CA: CPT | Performed by: FAMILY MEDICINE

## 2017-01-01 PROCEDURE — G8978 MOBILITY CURRENT STATUS: HCPCS

## 2017-01-01 PROCEDURE — 25010000002 METHYLPREDNISOLONE PER 125 MG: Performed by: INTERNAL MEDICINE

## 2017-01-01 PROCEDURE — 93010 ELECTROCARDIOGRAM REPORT: CPT | Performed by: INTERNAL MEDICINE

## 2017-01-01 PROCEDURE — 85007 BL SMEAR W/DIFF WBC COUNT: CPT | Performed by: INTERNAL MEDICINE

## 2017-01-01 PROCEDURE — 93005 ELECTROCARDIOGRAM TRACING: CPT

## 2017-01-01 PROCEDURE — 25010000002 MORPHINE SULFATE (PF) 2 MG/ML SOLUTION: Performed by: INTERNAL MEDICINE

## 2017-01-01 PROCEDURE — 99214 OFFICE O/P EST MOD 30 MIN: CPT | Performed by: THORACIC SURGERY (CARDIOTHORACIC VASCULAR SURGERY)

## 2017-01-01 PROCEDURE — 83880 ASSAY OF NATRIURETIC PEPTIDE: CPT | Performed by: EMERGENCY MEDICINE

## 2017-01-01 PROCEDURE — 99221 1ST HOSP IP/OBS SF/LOW 40: CPT | Performed by: NURSE PRACTITIONER

## 2017-01-01 PROCEDURE — G8988 SELF CARE GOAL STATUS: HCPCS

## 2017-01-01 PROCEDURE — 80053 COMPREHEN METABOLIC PANEL: CPT | Performed by: EMERGENCY MEDICINE

## 2017-01-01 PROCEDURE — 85610 PROTHROMBIN TIME: CPT | Performed by: EMERGENCY MEDICINE

## 2017-01-01 PROCEDURE — 85025 COMPLETE CBC W/AUTO DIFF WBC: CPT | Performed by: FAMILY MEDICINE

## 2017-01-01 PROCEDURE — 94762 N-INVAS EAR/PLS OXIMTRY CONT: CPT

## 2017-01-01 PROCEDURE — C1751 CATH, INF, PER/CENT/MIDLINE: HCPCS

## 2017-01-01 PROCEDURE — 82803 BLOOD GASES ANY COMBINATION: CPT | Performed by: HOSPITALIST

## 2017-01-01 PROCEDURE — 85025 COMPLETE CBC W/AUTO DIFF WBC: CPT | Performed by: PHYSICIAN ASSISTANT

## 2017-01-01 PROCEDURE — 25010000002 FUROSEMIDE PER 20 MG

## 2017-01-01 PROCEDURE — 97162 PT EVAL MOD COMPLEX 30 MIN: CPT

## 2017-01-01 PROCEDURE — 82570 ASSAY OF URINE CREATININE: CPT | Performed by: INTERNAL MEDICINE

## 2017-01-01 PROCEDURE — 85027 COMPLETE CBC AUTOMATED: CPT | Performed by: FAMILY MEDICINE

## 2017-01-01 PROCEDURE — 94640 AIRWAY INHALATION TREATMENT: CPT

## 2017-01-01 PROCEDURE — 87205 SMEAR GRAM STAIN: CPT | Performed by: INTERNAL MEDICINE

## 2017-01-01 RX ORDER — HYDRALAZINE HYDROCHLORIDE 25 MG/1
25 TABLET, FILM COATED ORAL NIGHTLY
COMMUNITY

## 2017-01-01 RX ORDER — GLIPIZIDE 2.5 MG/1
2.5 TABLET, EXTENDED RELEASE ORAL NIGHTLY
Status: DISCONTINUED | OUTPATIENT
Start: 2017-01-01 | End: 2017-01-01

## 2017-01-01 RX ORDER — WARFARIN SODIUM 7.5 MG/1
7.5 TABLET ORAL
Status: DISCONTINUED | OUTPATIENT
Start: 2017-01-01 | End: 2017-01-01 | Stop reason: DRUGHIGH

## 2017-01-01 RX ORDER — FUROSEMIDE 10 MG/ML
40 INJECTION INTRAMUSCULAR; INTRAVENOUS EVERY 12 HOURS
Status: DISCONTINUED | OUTPATIENT
Start: 2017-01-01 | End: 2017-01-01

## 2017-01-01 RX ORDER — LORAZEPAM 2 MG/ML
0.5 CONCENTRATE ORAL EVERY 8 HOURS
Status: DISCONTINUED | OUTPATIENT
Start: 2017-01-01 | End: 2017-01-01 | Stop reason: HOSPADM

## 2017-01-01 RX ORDER — WARFARIN SODIUM 1 MG/1
1 TABLET ORAL
Qty: 30 TABLET | Refills: 6 | Status: SHIPPED | OUTPATIENT
Start: 2017-01-01

## 2017-01-01 RX ORDER — SCOLOPAMINE TRANSDERMAL SYSTEM 1 MG/1
1 PATCH, EXTENDED RELEASE TRANSDERMAL
Status: DISCONTINUED | OUTPATIENT
Start: 2017-01-01 | End: 2017-01-01 | Stop reason: HOSPADM

## 2017-01-01 RX ORDER — FUROSEMIDE 10 MG/ML
40 INJECTION INTRAMUSCULAR; INTRAVENOUS DAILY
Status: DISCONTINUED | OUTPATIENT
Start: 2017-01-01 | End: 2017-01-01

## 2017-01-01 RX ORDER — LORAZEPAM 2 MG/ML
0.5 INJECTION INTRAMUSCULAR EVERY 4 HOURS PRN
Status: DISCONTINUED | OUTPATIENT
Start: 2017-01-01 | End: 2017-01-01

## 2017-01-01 RX ORDER — METHYLPREDNISOLONE SODIUM SUCCINATE 125 MG/2ML
60 INJECTION, POWDER, LYOPHILIZED, FOR SOLUTION INTRAMUSCULAR; INTRAVENOUS EVERY 6 HOURS
Status: DISCONTINUED | OUTPATIENT
Start: 2017-01-01 | End: 2017-01-01

## 2017-01-01 RX ORDER — CLOPIDOGREL BISULFATE 75 MG/1
75 TABLET ORAL DAILY
Status: DISCONTINUED | OUTPATIENT
Start: 2017-01-01 | End: 2017-01-01

## 2017-01-01 RX ORDER — HYDRALAZINE HYDROCHLORIDE 50 MG/1
100 TABLET, FILM COATED ORAL EVERY 6 HOURS SCHEDULED
Status: DISCONTINUED | OUTPATIENT
Start: 2017-01-01 | End: 2017-01-01

## 2017-01-01 RX ORDER — HYOSCYAMINE SULFATE 0.12 MG/ML
0.12 LIQUID ORAL EVERY 4 HOURS PRN
Status: DISCONTINUED | OUTPATIENT
Start: 2017-01-01 | End: 2017-01-01 | Stop reason: SDUPTHER

## 2017-01-01 RX ORDER — FUROSEMIDE 10 MG/ML
60 INJECTION INTRAMUSCULAR; INTRAVENOUS ONCE
Status: DISCONTINUED | OUTPATIENT
Start: 2017-01-01 | End: 2017-01-01

## 2017-01-01 RX ORDER — FUROSEMIDE 10 MG/ML
40 INJECTION INTRAMUSCULAR; INTRAVENOUS ONCE
Status: COMPLETED | OUTPATIENT
Start: 2017-01-01 | End: 2017-01-01

## 2017-01-01 RX ORDER — LORAZEPAM 2 MG/ML
0.5 INJECTION INTRAMUSCULAR
Status: DISCONTINUED | OUTPATIENT
Start: 2017-01-01 | End: 2017-01-01 | Stop reason: HOSPADM

## 2017-01-01 RX ORDER — METHYLPREDNISOLONE SODIUM SUCCINATE 40 MG/ML
40 INJECTION, POWDER, LYOPHILIZED, FOR SOLUTION INTRAMUSCULAR; INTRAVENOUS EVERY 8 HOURS
Status: DISCONTINUED | OUTPATIENT
Start: 2017-01-01 | End: 2017-01-01

## 2017-01-01 RX ORDER — FUROSEMIDE 40 MG/1
40 TABLET ORAL 2 TIMES DAILY
Status: DISCONTINUED | OUTPATIENT
Start: 2017-01-01 | End: 2017-01-01

## 2017-01-01 RX ORDER — ALBUMIN (HUMAN) 12.5 G/50ML
12.5 SOLUTION INTRAVENOUS ONCE
Status: DISCONTINUED | OUTPATIENT
Start: 2017-01-01 | End: 2017-01-01

## 2017-01-01 RX ORDER — SCOLOPAMINE TRANSDERMAL SYSTEM 1 MG/1
1 PATCH, EXTENDED RELEASE TRANSDERMAL
Status: DISCONTINUED | OUTPATIENT
Start: 2017-03-10 | End: 2017-01-01 | Stop reason: HOSPADM

## 2017-01-01 RX ORDER — WARFARIN SODIUM 4 MG/1
4 TABLET ORAL
Status: DISCONTINUED | OUTPATIENT
Start: 2017-01-01 | End: 2017-01-01 | Stop reason: DRUGHIGH

## 2017-01-01 RX ORDER — ESCITALOPRAM OXALATE 10 MG/1
10 TABLET ORAL DAILY
Status: DISCONTINUED | OUTPATIENT
Start: 2017-01-01 | End: 2017-01-01

## 2017-01-01 RX ORDER — MORPHINE SULFATE 4 MG/ML
4 INJECTION, SOLUTION INTRAMUSCULAR; INTRAVENOUS EVERY 4 HOURS PRN
Status: DISCONTINUED | OUTPATIENT
Start: 2017-01-01 | End: 2017-01-01

## 2017-01-01 RX ORDER — LORAZEPAM 2 MG/ML
0.5 INJECTION INTRAMUSCULAR
Status: CANCELLED | OUTPATIENT
Start: 2017-01-01 | End: 2017-03-17

## 2017-01-01 RX ORDER — ASPIRIN 325 MG
325 TABLET ORAL ONCE
Status: COMPLETED | OUTPATIENT
Start: 2017-01-01 | End: 2017-01-01

## 2017-01-01 RX ORDER — DEXTROSE MONOHYDRATE 25 G/50ML
INJECTION, SOLUTION INTRAVENOUS
Status: COMPLETED
Start: 2017-01-01 | End: 2017-01-01

## 2017-01-01 RX ORDER — WARFARIN SODIUM 1 MG/1
1 TABLET ORAL
Status: DISCONTINUED | OUTPATIENT
Start: 2017-01-01 | End: 2017-01-01 | Stop reason: DRUGHIGH

## 2017-01-01 RX ORDER — FUROSEMIDE 10 MG/ML
40 INJECTION INTRAMUSCULAR; INTRAVENOUS 2 TIMES DAILY
Status: DISCONTINUED | OUTPATIENT
Start: 2017-01-01 | End: 2017-01-01

## 2017-01-01 RX ORDER — MORPHINE SULFATE 4 MG/ML
4 INJECTION, SOLUTION INTRAMUSCULAR; INTRAVENOUS
Status: DISCONTINUED | OUTPATIENT
Start: 2017-01-01 | End: 2017-01-01 | Stop reason: HOSPADM

## 2017-01-01 RX ORDER — BUDESONIDE 0.5 MG/2ML
0.5 INHALANT ORAL
Status: CANCELLED | OUTPATIENT
Start: 2017-01-01

## 2017-01-01 RX ORDER — ALBUTEROL SULFATE 2.5 MG/3ML
2.5 SOLUTION RESPIRATORY (INHALATION) EVERY 4 HOURS PRN
Status: DISCONTINUED | OUTPATIENT
Start: 2017-01-01 | End: 2017-01-01 | Stop reason: HOSPADM

## 2017-01-01 RX ORDER — MORPHINE SULFATE 2 MG/ML
1 INJECTION, SOLUTION INTRAMUSCULAR; INTRAVENOUS
Status: DISCONTINUED | OUTPATIENT
Start: 2017-01-01 | End: 2017-01-01

## 2017-01-01 RX ORDER — ATROPINE SULFATE 10 MG/ML
2 SOLUTION/ DROPS OPHTHALMIC 2 TIMES DAILY
Status: CANCELLED | OUTPATIENT
Start: 2017-01-01

## 2017-01-01 RX ORDER — FUROSEMIDE 10 MG/ML
80 INJECTION INTRAMUSCULAR; INTRAVENOUS DAILY
Status: DISCONTINUED | OUTPATIENT
Start: 2017-01-01 | End: 2017-01-01

## 2017-01-01 RX ORDER — NICOTINE POLACRILEX 4 MG
15 LOZENGE BUCCAL
Status: DISCONTINUED | OUTPATIENT
Start: 2017-01-01 | End: 2017-01-01 | Stop reason: HOSPADM

## 2017-01-01 RX ORDER — SODIUM CHLORIDE 0.9 % (FLUSH) 0.9 %
10 SYRINGE (ML) INJECTION AS NEEDED
Status: DISCONTINUED | OUTPATIENT
Start: 2017-01-01 | End: 2017-01-01 | Stop reason: HOSPADM

## 2017-01-01 RX ORDER — ISOSORBIDE MONONITRATE 60 MG/1
60 TABLET, EXTENDED RELEASE ORAL
Status: DISCONTINUED | OUTPATIENT
Start: 2017-01-01 | End: 2017-01-01

## 2017-01-01 RX ORDER — FUROSEMIDE 10 MG/ML
20 INJECTION INTRAMUSCULAR; INTRAVENOUS ONCE
Status: COMPLETED | OUTPATIENT
Start: 2017-01-01 | End: 2017-01-01

## 2017-01-01 RX ORDER — FUROSEMIDE 10 MG/ML
INJECTION INTRAMUSCULAR; INTRAVENOUS
Status: COMPLETED
Start: 2017-01-01 | End: 2017-01-01

## 2017-01-01 RX ORDER — MORPHINE SULFATE 2 MG/ML
2 INJECTION, SOLUTION INTRAMUSCULAR; INTRAVENOUS
Status: DISCONTINUED | OUTPATIENT
Start: 2017-01-01 | End: 2017-01-01

## 2017-01-01 RX ORDER — LORAZEPAM 2 MG/ML
0.5 CONCENTRATE ORAL EVERY 8 HOURS
Status: CANCELLED | OUTPATIENT
Start: 2017-01-01 | End: 2017-03-17

## 2017-01-01 RX ORDER — ATROPINE SULFATE 10 MG/ML
2 SOLUTION/ DROPS OPHTHALMIC 2 TIMES DAILY
Status: DISCONTINUED | OUTPATIENT
Start: 2017-01-01 | End: 2017-01-01

## 2017-01-01 RX ORDER — ALBUTEROL SULFATE 90 UG/1
2 AEROSOL, METERED RESPIRATORY (INHALATION) EVERY 4 HOURS PRN
Status: DISCONTINUED | OUTPATIENT
Start: 2017-01-01 | End: 2017-01-01 | Stop reason: HOSPADM

## 2017-01-01 RX ORDER — ISOSORBIDE MONONITRATE 30 MG/1
30 TABLET, EXTENDED RELEASE ORAL
Status: DISCONTINUED | OUTPATIENT
Start: 2017-01-01 | End: 2017-01-01

## 2017-01-01 RX ORDER — METHYLPREDNISOLONE SODIUM SUCCINATE 40 MG/ML
40 INJECTION, POWDER, LYOPHILIZED, FOR SOLUTION INTRAMUSCULAR; INTRAVENOUS EVERY 12 HOURS
Status: DISCONTINUED | OUTPATIENT
Start: 2017-01-01 | End: 2017-01-01

## 2017-01-01 RX ORDER — SCOLOPAMINE TRANSDERMAL SYSTEM 1 MG/1
1 PATCH, EXTENDED RELEASE TRANSDERMAL
Status: CANCELLED | OUTPATIENT
Start: 2017-03-10

## 2017-01-01 RX ORDER — DILTIAZEM HYDROCHLORIDE 240 MG/1
240 CAPSULE, COATED, EXTENDED RELEASE ORAL DAILY
Qty: 30 CAPSULE | Refills: 6 | Status: SHIPPED | OUTPATIENT
Start: 2017-01-01

## 2017-01-01 RX ORDER — MORPHINE SULFATE 10 MG/5ML
2 SOLUTION ORAL
Status: DISCONTINUED | OUTPATIENT
Start: 2017-01-01 | End: 2017-01-01 | Stop reason: HOSPADM

## 2017-01-01 RX ORDER — HYDRALAZINE HYDROCHLORIDE 25 MG/1
25 TABLET, FILM COATED ORAL EVERY 8 HOURS SCHEDULED
Status: DISCONTINUED | OUTPATIENT
Start: 2017-01-01 | End: 2017-01-01

## 2017-01-01 RX ORDER — ATORVASTATIN CALCIUM 20 MG/1
20 TABLET, FILM COATED ORAL DAILY
Status: DISCONTINUED | OUTPATIENT
Start: 2017-01-01 | End: 2017-01-01

## 2017-01-01 RX ORDER — POTASSIUM CHLORIDE 20 MEQ/1
20 TABLET, EXTENDED RELEASE ORAL 4 TIMES DAILY
Status: COMPLETED | OUTPATIENT
Start: 2017-01-01 | End: 2017-01-01

## 2017-01-01 RX ORDER — SODIUM CHLORIDE 0.9 % (FLUSH) 0.9 %
10 SYRINGE (ML) INJECTION AS NEEDED
Status: CANCELLED | OUTPATIENT
Start: 2017-01-01

## 2017-01-01 RX ORDER — WARFARIN SODIUM 2 MG/1
2 TABLET ORAL
Status: DISCONTINUED | OUTPATIENT
Start: 2017-01-01 | End: 2017-01-01 | Stop reason: DRUGHIGH

## 2017-01-01 RX ORDER — MORPHINE SULFATE 4 MG/ML
4 INJECTION, SOLUTION INTRAMUSCULAR; INTRAVENOUS
Status: CANCELLED | OUTPATIENT
Start: 2017-01-01 | End: 2017-03-17

## 2017-01-01 RX ORDER — MORPHINE SULFATE 10 MG/5ML
2 SOLUTION ORAL
Status: CANCELLED | OUTPATIENT
Start: 2017-01-01 | End: 2017-03-17

## 2017-01-01 RX ORDER — SIMVASTATIN 40 MG
20 TABLET ORAL NIGHTLY
Qty: 30 TABLET | Refills: 11
Start: 2017-01-01

## 2017-01-01 RX ORDER — PANTOPRAZOLE SODIUM 40 MG/1
40 TABLET, DELAYED RELEASE ORAL
Status: DISCONTINUED | OUTPATIENT
Start: 2017-01-01 | End: 2017-01-01

## 2017-01-01 RX ORDER — BUDESONIDE 0.5 MG/2ML
0.5 INHALANT ORAL
Status: DISCONTINUED | OUTPATIENT
Start: 2017-01-01 | End: 2017-01-01 | Stop reason: HOSPADM

## 2017-01-01 RX ORDER — MELATONIN
1000 DAILY
Status: DISCONTINUED | OUTPATIENT
Start: 2017-01-01 | End: 2017-01-01

## 2017-01-01 RX ORDER — DILTIAZEM HYDROCHLORIDE 240 MG/1
240 CAPSULE, COATED, EXTENDED RELEASE ORAL DAILY
Status: DISCONTINUED | OUTPATIENT
Start: 2017-01-01 | End: 2017-01-01

## 2017-01-01 RX ORDER — MORPHINE SULFATE 4 MG/ML
4 INJECTION, SOLUTION INTRAMUSCULAR; INTRAVENOUS EVERY 4 HOURS PRN
Status: CANCELLED | OUTPATIENT
Start: 2017-01-01 | End: 2017-03-17

## 2017-01-01 RX ORDER — ATROPINE SULFATE 10 MG/ML
2 SOLUTION/ DROPS OPHTHALMIC 2 TIMES DAILY
Status: DISCONTINUED | OUTPATIENT
Start: 2017-01-01 | End: 2017-01-01 | Stop reason: HOSPADM

## 2017-01-01 RX ORDER — DEXTROSE MONOHYDRATE 25 G/50ML
50 INJECTION, SOLUTION INTRAVENOUS
Status: DISCONTINUED | OUTPATIENT
Start: 2017-01-01 | End: 2017-01-01 | Stop reason: HOSPADM

## 2017-01-01 RX ORDER — HYDRALAZINE HYDROCHLORIDE 50 MG/1
100 TABLET, FILM COATED ORAL EVERY 8 HOURS SCHEDULED
Status: DISCONTINUED | OUTPATIENT
Start: 2017-01-01 | End: 2017-01-01

## 2017-01-01 RX ORDER — DEXTROSE MONOHYDRATE 25 G/50ML
25 INJECTION, SOLUTION INTRAVENOUS
Status: DISCONTINUED | OUTPATIENT
Start: 2017-01-01 | End: 2017-01-01 | Stop reason: HOSPADM

## 2017-01-01 RX ORDER — ATROPINE SULFATE 10 MG/ML
2 SOLUTION/ DROPS OPHTHALMIC
Status: DISCONTINUED | OUTPATIENT
Start: 2017-01-01 | End: 2017-01-01 | Stop reason: HOSPADM

## 2017-01-01 RX ORDER — ALBUTEROL SULFATE 2.5 MG/3ML
2.5 SOLUTION RESPIRATORY (INHALATION) EVERY 4 HOURS PRN
Status: CANCELLED | OUTPATIENT
Start: 2017-01-01

## 2017-01-01 RX ORDER — FUROSEMIDE 10 MG/ML
80 INJECTION INTRAMUSCULAR; INTRAVENOUS ONCE
Status: COMPLETED | OUTPATIENT
Start: 2017-01-01 | End: 2017-01-01

## 2017-01-01 RX ORDER — METHYLPREDNISOLONE SODIUM SUCCINATE 40 MG/ML
20 INJECTION, POWDER, LYOPHILIZED, FOR SOLUTION INTRAMUSCULAR; INTRAVENOUS EVERY 12 HOURS
Status: DISCONTINUED | OUTPATIENT
Start: 2017-01-01 | End: 2017-01-01

## 2017-01-01 RX ORDER — ACETAMINOPHEN 325 MG/1
650 TABLET ORAL EVERY 6 HOURS PRN
Status: DISCONTINUED | OUTPATIENT
Start: 2017-01-01 | End: 2017-01-01

## 2017-01-01 RX ORDER — HYDRALAZINE HYDROCHLORIDE 25 MG/1
25 TABLET, FILM COATED ORAL NIGHTLY
Status: DISCONTINUED | OUTPATIENT
Start: 2017-01-01 | End: 2017-01-01

## 2017-01-01 RX ORDER — ALBUMIN (HUMAN) 12.5 G/50ML
12.5 SOLUTION INTRAVENOUS ONCE
Status: COMPLETED | OUTPATIENT
Start: 2017-01-01 | End: 2017-01-01

## 2017-01-01 RX ORDER — WARFARIN SODIUM 5 MG/1
5 TABLET ORAL
Qty: 60 TABLET | Refills: 11 | Status: SHIPPED | OUTPATIENT
Start: 2017-01-01 | End: 2017-01-01

## 2017-01-01 RX ADMIN — INSULIN ASPART 6 UNITS: 100 INJECTION, SOLUTION INTRAVENOUS; SUBCUTANEOUS at 11:32

## 2017-01-01 RX ADMIN — DEXTROSE MONOHYDRATE 50 ML: 25 INJECTION, SOLUTION INTRAVENOUS at 06:45

## 2017-01-01 RX ADMIN — INSULIN ASPART 6 UNITS: 100 INJECTION, SOLUTION INTRAVENOUS; SUBCUTANEOUS at 20:51

## 2017-01-01 RX ADMIN — HYDRALAZINE HYDROCHLORIDE 25 MG: 25 TABLET, FILM COATED ORAL at 21:47

## 2017-01-01 RX ADMIN — ISOSORBIDE MONONITRATE 60 MG: 60 TABLET, EXTENDED RELEASE ORAL at 09:52

## 2017-01-01 RX ADMIN — FUROSEMIDE 40 MG: 10 INJECTION, SOLUTION INTRAMUSCULAR; INTRAVENOUS at 08:45

## 2017-01-01 RX ADMIN — INSULIN ASPART 6 UNITS: 100 INJECTION, SOLUTION INTRAVENOUS; SUBCUTANEOUS at 18:02

## 2017-01-01 RX ADMIN — ACETAMINOPHEN 650 MG: 325 TABLET ORAL at 11:36

## 2017-01-01 RX ADMIN — BUDESONIDE 0.5 MG: 0.5 INHALANT RESPIRATORY (INHALATION) at 06:51

## 2017-01-01 RX ADMIN — DILTIAZEM HYDROCHLORIDE 240 MG: 240 CAPSULE, COATED, EXTENDED RELEASE ORAL at 08:46

## 2017-01-01 RX ADMIN — ATORVASTATIN CALCIUM 20 MG: 20 TABLET, FILM COATED ORAL at 08:58

## 2017-01-01 RX ADMIN — INSULIN DETEMIR 12 UNITS: 100 INJECTION, SOLUTION SUBCUTANEOUS at 21:59

## 2017-01-01 RX ADMIN — Medication 10 ML: at 21:25

## 2017-01-01 RX ADMIN — INSULIN ASPART 7 UNITS: 100 INJECTION, SOLUTION INTRAVENOUS; SUBCUTANEOUS at 17:29

## 2017-01-01 RX ADMIN — HYDRALAZINE HYDROCHLORIDE 25 MG: 25 TABLET, FILM COATED ORAL at 21:30

## 2017-01-01 RX ADMIN — BUDESONIDE 0.5 MG: 0.5 INHALANT RESPIRATORY (INHALATION) at 08:38

## 2017-01-01 RX ADMIN — ESCITALOPRAM OXALATE 10 MG: 10 TABLET ORAL at 08:58

## 2017-01-01 RX ADMIN — PANTOPRAZOLE SODIUM 40 MG: 40 TABLET, DELAYED RELEASE ORAL at 05:12

## 2017-01-01 RX ADMIN — BUDESONIDE 0.5 MG: 0.5 INHALANT RESPIRATORY (INHALATION) at 19:09

## 2017-01-01 RX ADMIN — FUROSEMIDE 40 MG: 40 TABLET ORAL at 08:58

## 2017-01-01 RX ADMIN — IPRATROPIUM BROMIDE 0.5 MG: 0.5 SOLUTION RESPIRATORY (INHALATION) at 21:03

## 2017-01-01 RX ADMIN — METHYLPREDNISOLONE SODIUM SUCCINATE 40 MG: 40 INJECTION, POWDER, FOR SOLUTION INTRAMUSCULAR; INTRAVENOUS at 03:19

## 2017-01-01 RX ADMIN — IPRATROPIUM BROMIDE 0.5 MG: 0.5 SOLUTION RESPIRATORY (INHALATION) at 10:35

## 2017-01-01 RX ADMIN — IPRATROPIUM BROMIDE 0.5 MG: 0.5 SOLUTION RESPIRATORY (INHALATION) at 15:40

## 2017-01-01 RX ADMIN — METHYLPREDNISOLONE SODIUM SUCCINATE 40 MG: 40 INJECTION, POWDER, FOR SOLUTION INTRAMUSCULAR; INTRAVENOUS at 05:21

## 2017-01-01 RX ADMIN — INSULIN ASPART 7 UNITS: 100 INJECTION, SOLUTION INTRAVENOUS; SUBCUTANEOUS at 18:19

## 2017-01-01 RX ADMIN — ESCITALOPRAM OXALATE 10 MG: 10 TABLET ORAL at 09:01

## 2017-01-01 RX ADMIN — FUROSEMIDE 40 MG: 10 INJECTION, SOLUTION INTRAMUSCULAR; INTRAVENOUS at 21:29

## 2017-01-01 RX ADMIN — INSULIN ASPART 8 UNITS: 100 INJECTION, SOLUTION INTRAVENOUS; SUBCUTANEOUS at 17:44

## 2017-01-01 RX ADMIN — LORAZEPAM 0.5 MG: 2 SOLUTION, CONCENTRATE ORAL at 19:25

## 2017-01-01 RX ADMIN — CLOPIDOGREL BISULFATE 75 MG: 75 TABLET ORAL at 08:42

## 2017-01-01 RX ADMIN — ISOSORBIDE MONONITRATE 60 MG: 60 TABLET, EXTENDED RELEASE ORAL at 08:36

## 2017-01-01 RX ADMIN — BUDESONIDE 0.5 MG: 0.5 INHALANT RESPIRATORY (INHALATION) at 09:06

## 2017-01-01 RX ADMIN — ESCITALOPRAM OXALATE 10 MG: 10 TABLET ORAL at 08:46

## 2017-01-01 RX ADMIN — IPRATROPIUM BROMIDE 0.5 MG: 0.5 SOLUTION RESPIRATORY (INHALATION) at 13:13

## 2017-01-01 RX ADMIN — INSULIN ASPART 2 UNITS: 100 INJECTION, SOLUTION INTRAVENOUS; SUBCUTANEOUS at 08:26

## 2017-01-01 RX ADMIN — FUROSEMIDE 40 MG: 10 INJECTION INTRAMUSCULAR; INTRAVENOUS at 10:29

## 2017-01-01 RX ADMIN — Medication 10 ML: at 09:57

## 2017-01-01 RX ADMIN — ATORVASTATIN CALCIUM 20 MG: 20 TABLET, FILM COATED ORAL at 08:26

## 2017-01-01 RX ADMIN — ESCITALOPRAM OXALATE 10 MG: 10 TABLET ORAL at 08:57

## 2017-01-01 RX ADMIN — FUROSEMIDE 40 MG: 40 TABLET ORAL at 17:45

## 2017-01-01 RX ADMIN — VITAMIN D, TAB 1000IU (100/BT) 1000 UNITS: 25 TAB at 08:58

## 2017-01-01 RX ADMIN — Medication 10 ML: at 10:24

## 2017-01-01 RX ADMIN — DILTIAZEM HYDROCHLORIDE 240 MG: 240 CAPSULE, COATED, EXTENDED RELEASE ORAL at 09:52

## 2017-01-01 RX ADMIN — HYDRALAZINE HYDROCHLORIDE 25 MG: 25 TABLET, FILM COATED ORAL at 05:31

## 2017-01-01 RX ADMIN — HYDRALAZINE HYDROCHLORIDE 100 MG: 50 TABLET ORAL at 05:12

## 2017-01-01 RX ADMIN — BUDESONIDE 0.5 MG: 0.5 INHALANT RESPIRATORY (INHALATION) at 19:07

## 2017-01-01 RX ADMIN — ALBUMIN (HUMAN) 5 ML/HR: 0.25 INJECTION, SOLUTION INTRAVENOUS at 07:40

## 2017-01-01 RX ADMIN — HYDRALAZINE HYDROCHLORIDE 100 MG: 50 TABLET ORAL at 13:23

## 2017-01-01 RX ADMIN — BUDESONIDE 0.5 MG: 0.5 INHALANT RESPIRATORY (INHALATION) at 14:14

## 2017-01-01 RX ADMIN — ATORVASTATIN CALCIUM 20 MG: 20 TABLET, FILM COATED ORAL at 08:35

## 2017-01-01 RX ADMIN — FUROSEMIDE 40 MG: 10 INJECTION, SOLUTION INTRAMUSCULAR; INTRAVENOUS at 08:48

## 2017-01-01 RX ADMIN — METHYLPREDNISOLONE SODIUM SUCCINATE 20 MG: 40 INJECTION, POWDER, FOR SOLUTION INTRAMUSCULAR; INTRAVENOUS at 05:11

## 2017-01-01 RX ADMIN — HYDRALAZINE HYDROCHLORIDE 100 MG: 50 TABLET ORAL at 08:43

## 2017-01-01 RX ADMIN — GLIPIZIDE 2.5 MG: 2.5 TABLET, FILM COATED, EXTENDED RELEASE ORAL at 21:20

## 2017-01-01 RX ADMIN — WARFARIN SODIUM 2 MG: 2 TABLET ORAL at 17:29

## 2017-01-01 RX ADMIN — PANTOPRAZOLE SODIUM 40 MG: 40 TABLET, DELAYED RELEASE ORAL at 10:00

## 2017-01-01 RX ADMIN — HYDRALAZINE HYDROCHLORIDE 100 MG: 50 TABLET ORAL at 16:01

## 2017-01-01 RX ADMIN — CLOPIDOGREL BISULFATE 75 MG: 75 TABLET ORAL at 08:46

## 2017-01-01 RX ADMIN — INSULIN ASPART 6 UNITS: 100 INJECTION, SOLUTION INTRAVENOUS; SUBCUTANEOUS at 08:36

## 2017-01-01 RX ADMIN — METHYLPREDNISOLONE SODIUM SUCCINATE 40 MG: 40 INJECTION, POWDER, FOR SOLUTION INTRAMUSCULAR; INTRAVENOUS at 14:50

## 2017-01-01 RX ADMIN — ESCITALOPRAM OXALATE 10 MG: 10 TABLET ORAL at 08:45

## 2017-01-01 RX ADMIN — METHYLPREDNISOLONE SODIUM SUCCINATE 40 MG: 125 INJECTION, POWDER, FOR SOLUTION INTRAMUSCULAR; INTRAVENOUS at 21:20

## 2017-01-01 RX ADMIN — VITAMIN D, TAB 1000IU (100/BT) 1000 UNITS: 25 TAB at 09:52

## 2017-01-01 RX ADMIN — FUROSEMIDE 10 MG/HR: 10 INJECTION, SOLUTION INTRAVENOUS at 16:21

## 2017-01-01 RX ADMIN — Medication 10 ML: at 23:03

## 2017-01-01 RX ADMIN — LORAZEPAM 0.5 MG: 2 SOLUTION, CONCENTRATE ORAL at 03:29

## 2017-01-01 RX ADMIN — VITAMIN D, TAB 1000IU (100/BT) 1000 UNITS: 25 TAB at 10:21

## 2017-01-01 RX ADMIN — SCOPALAMINE 1 PATCH: 1 PATCH, EXTENDED RELEASE TRANSDERMAL at 11:40

## 2017-01-01 RX ADMIN — HYDRALAZINE HYDROCHLORIDE 25 MG: 25 TABLET ORAL at 21:20

## 2017-01-01 RX ADMIN — IPRATROPIUM BROMIDE 0.5 MG: 0.5 SOLUTION RESPIRATORY (INHALATION) at 12:14

## 2017-01-01 RX ADMIN — INSULIN DETEMIR 15 UNITS: 100 INJECTION, SOLUTION SUBCUTANEOUS at 21:25

## 2017-01-01 RX ADMIN — MORPHINE SULFATE 4 MG: 4 INJECTION, SOLUTION INTRAMUSCULAR; INTRAVENOUS at 05:50

## 2017-01-01 RX ADMIN — INSULIN DETEMIR 15 UNITS: 100 INJECTION, SOLUTION SUBCUTANEOUS at 20:53

## 2017-01-01 RX ADMIN — INSULIN ASPART 8 UNITS: 100 INJECTION, SOLUTION INTRAVENOUS; SUBCUTANEOUS at 21:30

## 2017-01-01 RX ADMIN — ATROPINE SULFATE 2 DROP: 10 SOLUTION/ DROPS OPHTHALMIC at 08:55

## 2017-01-01 RX ADMIN — LORAZEPAM 0.5 MG: 2 INJECTION, SOLUTION INTRAMUSCULAR; INTRAVENOUS at 17:10

## 2017-01-01 RX ADMIN — VITAMIN D, TAB 1000IU (100/BT) 1000 UNITS: 25 TAB at 08:22

## 2017-01-01 RX ADMIN — METHYLPREDNISOLONE SODIUM SUCCINATE 40 MG: 40 INJECTION, POWDER, FOR SOLUTION INTRAMUSCULAR; INTRAVENOUS at 16:20

## 2017-01-01 RX ADMIN — ISOSORBIDE MONONITRATE 60 MG: 60 TABLET, EXTENDED RELEASE ORAL at 08:42

## 2017-01-01 RX ADMIN — INSULIN DETEMIR 15 UNITS: 100 INJECTION, SOLUTION SUBCUTANEOUS at 21:31

## 2017-01-01 RX ADMIN — ATORVASTATIN CALCIUM 20 MG: 20 TABLET, FILM COATED ORAL at 22:59

## 2017-01-01 RX ADMIN — ESCITALOPRAM OXALATE 10 MG: 10 TABLET ORAL at 08:43

## 2017-01-01 RX ADMIN — METHYLPREDNISOLONE SODIUM SUCCINATE 40 MG: 40 INJECTION, POWDER, FOR SOLUTION INTRAMUSCULAR; INTRAVENOUS at 03:40

## 2017-01-01 RX ADMIN — VITAMIN D, TAB 1000IU (100/BT) 1000 UNITS: 25 TAB at 08:46

## 2017-01-01 RX ADMIN — ATORVASTATIN CALCIUM 20 MG: 20 TABLET, FILM COATED ORAL at 09:00

## 2017-01-01 RX ADMIN — ATORVASTATIN CALCIUM 20 MG: 20 TABLET, FILM COATED ORAL at 08:47

## 2017-01-01 RX ADMIN — Medication 10 ML: at 12:36

## 2017-01-01 RX ADMIN — FUROSEMIDE 40 MG: 40 TABLET ORAL at 18:18

## 2017-01-01 RX ADMIN — LORAZEPAM 0.5 MG: 2 INJECTION INTRAMUSCULAR; INTRAVENOUS at 20:37

## 2017-01-01 RX ADMIN — VITAMIN D, TAB 1000IU (100/BT) 1000 UNITS: 25 TAB at 22:59

## 2017-01-01 RX ADMIN — IPRATROPIUM BROMIDE 0.5 MG: 0.5 SOLUTION RESPIRATORY (INHALATION) at 21:22

## 2017-01-01 RX ADMIN — HYDRALAZINE HYDROCHLORIDE 25 MG: 25 TABLET, FILM COATED ORAL at 14:02

## 2017-01-01 RX ADMIN — HYDRALAZINE HYDROCHLORIDE 100 MG: 50 TABLET ORAL at 06:02

## 2017-01-01 RX ADMIN — INSULIN ASPART 8 UNITS: 100 INJECTION, SOLUTION INTRAVENOUS; SUBCUTANEOUS at 13:23

## 2017-01-01 RX ADMIN — BUDESONIDE 0.5 MG: 0.5 INHALANT RESPIRATORY (INHALATION) at 19:40

## 2017-01-01 RX ADMIN — ATORVASTATIN CALCIUM 20 MG: 20 TABLET, FILM COATED ORAL at 08:46

## 2017-01-01 RX ADMIN — WARFARIN SODIUM 1 MG: 1 TABLET ORAL at 17:48

## 2017-01-01 RX ADMIN — WARFARIN SODIUM 2 MG: 2 TABLET ORAL at 17:50

## 2017-01-01 RX ADMIN — ESCITALOPRAM OXALATE 10 MG: 10 TABLET ORAL at 08:22

## 2017-01-01 RX ADMIN — HYDRALAZINE HYDROCHLORIDE 25 MG: 25 TABLET ORAL at 20:48

## 2017-01-01 RX ADMIN — LORAZEPAM 0.5 MG: 2 INJECTION INTRAMUSCULAR; INTRAVENOUS at 21:28

## 2017-01-01 RX ADMIN — WARFARIN SODIUM 7.5 MG: 7.5 TABLET ORAL at 17:39

## 2017-01-01 RX ADMIN — ATORVASTATIN CALCIUM 20 MG: 20 TABLET, FILM COATED ORAL at 08:22

## 2017-01-01 RX ADMIN — VITAMIN D, TAB 1000IU (100/BT) 1000 UNITS: 25 TAB at 08:54

## 2017-01-01 RX ADMIN — GLIPIZIDE 2.5 MG: 2.5 TABLET, FILM COATED, EXTENDED RELEASE ORAL at 22:03

## 2017-01-01 RX ADMIN — ATROPINE SULFATE 2 DROP: 10 SOLUTION/ DROPS OPHTHALMIC at 18:11

## 2017-01-01 RX ADMIN — IPRATROPIUM BROMIDE 0.5 MG: 0.5 SOLUTION RESPIRATORY (INHALATION) at 08:39

## 2017-01-01 RX ADMIN — ACETAMINOPHEN 650 MG: 325 TABLET ORAL at 19:00

## 2017-01-01 RX ADMIN — METHYLPREDNISOLONE SODIUM SUCCINATE 40 MG: 40 INJECTION, POWDER, FOR SOLUTION INTRAMUSCULAR; INTRAVENOUS at 04:14

## 2017-01-01 RX ADMIN — BUDESONIDE 0.5 MG: 0.5 INHALANT RESPIRATORY (INHALATION) at 07:45

## 2017-01-01 RX ADMIN — ATROPINE SULFATE 2 DROP: 10 SOLUTION/ DROPS OPHTHALMIC at 11:43

## 2017-01-01 RX ADMIN — NITROGLYCERIN 1 INCH: 20 OINTMENT TOPICAL at 11:54

## 2017-01-01 RX ADMIN — METHYLPREDNISOLONE SODIUM SUCCINATE 40 MG: 40 INJECTION, POWDER, FOR SOLUTION INTRAMUSCULAR; INTRAVENOUS at 15:13

## 2017-01-01 RX ADMIN — IPRATROPIUM BROMIDE 0.5 MG: 0.5 SOLUTION RESPIRATORY (INHALATION) at 07:56

## 2017-01-01 RX ADMIN — INSULIN DETEMIR 12 UNITS: 100 INJECTION, SOLUTION SUBCUTANEOUS at 20:49

## 2017-01-01 RX ADMIN — INSULIN ASPART 4 UNITS: 100 INJECTION, SOLUTION INTRAVENOUS; SUBCUTANEOUS at 17:51

## 2017-01-01 RX ADMIN — IPRATROPIUM BROMIDE 0.5 MG: 0.5 SOLUTION RESPIRATORY (INHALATION) at 09:06

## 2017-01-01 RX ADMIN — BUDESONIDE 0.5 MG: 0.5 INHALANT RESPIRATORY (INHALATION) at 21:03

## 2017-01-01 RX ADMIN — METHYLPREDNISOLONE SODIUM SUCCINATE 40 MG: 40 INJECTION, POWDER, FOR SOLUTION INTRAMUSCULAR; INTRAVENOUS at 17:34

## 2017-01-01 RX ADMIN — FUROSEMIDE 40 MG: 40 TABLET ORAL at 17:35

## 2017-01-01 RX ADMIN — METHYLPREDNISOLONE SODIUM SUCCINATE 20 MG: 40 INJECTION, POWDER, FOR SOLUTION INTRAMUSCULAR; INTRAVENOUS at 15:50

## 2017-01-01 RX ADMIN — WARFARIN SODIUM 4 MG: 4 TABLET ORAL at 17:33

## 2017-01-01 RX ADMIN — ATORVASTATIN CALCIUM 20 MG: 20 TABLET, FILM COATED ORAL at 09:52

## 2017-01-01 RX ADMIN — ESCITALOPRAM OXALATE 10 MG: 10 TABLET ORAL at 08:36

## 2017-01-01 RX ADMIN — LORAZEPAM 0.5 MG: 2 INJECTION INTRAMUSCULAR; INTRAVENOUS at 10:26

## 2017-01-01 RX ADMIN — FUROSEMIDE 10 MG/HR: 10 INJECTION, SOLUTION INTRAVENOUS at 02:13

## 2017-01-01 RX ADMIN — INSULIN ASPART 7 UNITS: 100 INJECTION, SOLUTION INTRAVENOUS; SUBCUTANEOUS at 09:54

## 2017-01-01 RX ADMIN — POTASSIUM CHLORIDE 20 MEQ: 20 TABLET, EXTENDED RELEASE ORAL at 21:21

## 2017-01-01 RX ADMIN — HYDRALAZINE HYDROCHLORIDE 100 MG: 50 TABLET ORAL at 17:33

## 2017-01-01 RX ADMIN — IPRATROPIUM BROMIDE 0.5 MG: 0.5 SOLUTION RESPIRATORY (INHALATION) at 15:37

## 2017-01-01 RX ADMIN — GLIPIZIDE 2.5 MG: 2.5 TABLET, FILM COATED, EXTENDED RELEASE ORAL at 21:21

## 2017-01-01 RX ADMIN — Medication 10 ML: at 14:48

## 2017-01-01 RX ADMIN — MORPHINE SULFATE 1 MG: 2 INJECTION, SOLUTION INTRAMUSCULAR; INTRAVENOUS at 23:38

## 2017-01-01 RX ADMIN — CLOPIDOGREL BISULFATE 75 MG: 75 TABLET ORAL at 23:00

## 2017-01-01 RX ADMIN — DILTIAZEM HYDROCHLORIDE 240 MG: 240 CAPSULE, COATED, EXTENDED RELEASE ORAL at 08:36

## 2017-01-01 RX ADMIN — CLOPIDOGREL BISULFATE 75 MG: 75 TABLET ORAL at 08:22

## 2017-01-01 RX ADMIN — INSULIN ASPART 4 UNITS: 100 INJECTION, SOLUTION INTRAVENOUS; SUBCUTANEOUS at 12:43

## 2017-01-01 RX ADMIN — FUROSEMIDE 40 MG: 10 INJECTION, SOLUTION INTRAMUSCULAR; INTRAVENOUS at 18:13

## 2017-01-01 RX ADMIN — HYDRALAZINE HYDROCHLORIDE 25 MG: 25 TABLET ORAL at 21:19

## 2017-01-01 RX ADMIN — MORPHINE SULFATE 1 MG: 2 INJECTION, SOLUTION INTRAMUSCULAR; INTRAVENOUS at 09:02

## 2017-01-01 RX ADMIN — VITAMIN D, TAB 1000IU (100/BT) 1000 UNITS: 25 TAB at 08:26

## 2017-01-01 RX ADMIN — INSULIN DETEMIR 15 UNITS: 100 INJECTION, SOLUTION SUBCUTANEOUS at 21:48

## 2017-01-01 RX ADMIN — INSULIN ASPART 8 UNITS: 100 INJECTION, SOLUTION INTRAVENOUS; SUBCUTANEOUS at 17:36

## 2017-01-01 RX ADMIN — INSULIN DETEMIR 15 UNITS: 100 INJECTION, SOLUTION SUBCUTANEOUS at 21:59

## 2017-01-01 RX ADMIN — CLOPIDOGREL BISULFATE 75 MG: 75 TABLET ORAL at 08:36

## 2017-01-01 RX ADMIN — METHYLPREDNISOLONE SODIUM SUCCINATE 40 MG: 125 INJECTION, POWDER, FOR SOLUTION INTRAMUSCULAR; INTRAVENOUS at 03:28

## 2017-01-01 RX ADMIN — IPRATROPIUM BROMIDE 0.5 MG: 0.5 SOLUTION RESPIRATORY (INHALATION) at 14:14

## 2017-01-01 RX ADMIN — LORAZEPAM 0.5 MG: 2 SOLUTION, CONCENTRATE ORAL at 11:47

## 2017-01-01 RX ADMIN — Medication 10 ML: at 06:46

## 2017-01-01 RX ADMIN — CLOPIDOGREL BISULFATE 75 MG: 75 TABLET ORAL at 10:21

## 2017-01-01 RX ADMIN — CLOPIDOGREL BISULFATE 75 MG: 75 TABLET ORAL at 08:58

## 2017-01-01 RX ADMIN — INSULIN ASPART 6 UNITS: 100 INJECTION, SOLUTION INTRAVENOUS; SUBCUTANEOUS at 12:10

## 2017-01-01 RX ADMIN — POTASSIUM CHLORIDE 20 MEQ: 20 TABLET, EXTENDED RELEASE ORAL at 12:56

## 2017-01-01 RX ADMIN — FUROSEMIDE 40 MG: 10 INJECTION, SOLUTION INTRAMUSCULAR; INTRAVENOUS at 17:59

## 2017-01-01 RX ADMIN — BUDESONIDE 0.5 MG: 0.5 INHALANT RESPIRATORY (INHALATION) at 19:24

## 2017-01-01 RX ADMIN — CLOPIDOGREL BISULFATE 75 MG: 75 TABLET ORAL at 09:52

## 2017-01-01 RX ADMIN — DILTIAZEM HYDROCHLORIDE 240 MG: 240 CAPSULE, COATED, EXTENDED RELEASE ORAL at 08:25

## 2017-01-01 RX ADMIN — METHYLPREDNISOLONE SODIUM SUCCINATE 40 MG: 40 INJECTION, POWDER, FOR SOLUTION INTRAMUSCULAR; INTRAVENOUS at 16:03

## 2017-01-01 RX ADMIN — VITAMIN D, TAB 1000IU (100/BT) 1000 UNITS: 25 TAB at 08:42

## 2017-01-01 RX ADMIN — ISOSORBIDE MONONITRATE 60 MG: 60 TABLET, EXTENDED RELEASE ORAL at 08:58

## 2017-01-01 RX ADMIN — INSULIN ASPART 7 UNITS: 100 INJECTION, SOLUTION INTRAVENOUS; SUBCUTANEOUS at 17:15

## 2017-01-01 RX ADMIN — INSULIN ASPART 6 UNITS: 100 INJECTION, SOLUTION INTRAVENOUS; SUBCUTANEOUS at 12:11

## 2017-01-01 RX ADMIN — ISOSORBIDE MONONITRATE 30 MG: 30 TABLET, EXTENDED RELEASE ORAL at 08:22

## 2017-01-01 RX ADMIN — INSULIN ASPART 6 UNITS: 100 INJECTION, SOLUTION INTRAVENOUS; SUBCUTANEOUS at 12:44

## 2017-01-01 RX ADMIN — FUROSEMIDE 80 MG: 10 INJECTION, SOLUTION INTRAMUSCULAR; INTRAVENOUS at 08:58

## 2017-01-01 RX ADMIN — INSULIN ASPART 9 UNITS: 100 INJECTION, SOLUTION INTRAVENOUS; SUBCUTANEOUS at 21:25

## 2017-01-01 RX ADMIN — HYDRALAZINE HYDROCHLORIDE 100 MG: 50 TABLET ORAL at 08:35

## 2017-01-01 RX ADMIN — CLOPIDOGREL BISULFATE 75 MG: 75 TABLET ORAL at 08:54

## 2017-01-01 RX ADMIN — POTASSIUM CHLORIDE 20 MEQ: 20 TABLET, EXTENDED RELEASE ORAL at 17:48

## 2017-01-01 RX ADMIN — ISOSORBIDE MONONITRATE 60 MG: 60 TABLET, EXTENDED RELEASE ORAL at 10:21

## 2017-01-01 RX ADMIN — FUROSEMIDE 40 MG: 10 INJECTION, SOLUTION INTRAMUSCULAR; INTRAVENOUS at 09:01

## 2017-01-01 RX ADMIN — ALBUMIN (HUMAN) 12.5 G: 0.25 INJECTION, SOLUTION INTRAVENOUS at 13:48

## 2017-01-01 RX ADMIN — ATORVASTATIN CALCIUM 20 MG: 20 TABLET, FILM COATED ORAL at 08:55

## 2017-01-01 RX ADMIN — DILTIAZEM HYDROCHLORIDE 240 MG: 240 CAPSULE, COATED, EXTENDED RELEASE ORAL at 23:01

## 2017-01-01 RX ADMIN — WARFARIN SODIUM 4 MG: 4 TABLET ORAL at 17:35

## 2017-01-01 RX ADMIN — MORPHINE SULFATE 4 MG: 4 INJECTION, SOLUTION INTRAMUSCULAR; INTRAVENOUS at 00:29

## 2017-01-01 RX ADMIN — METHYLPREDNISOLONE SODIUM SUCCINATE 40 MG: 40 INJECTION, POWDER, FOR SOLUTION INTRAMUSCULAR; INTRAVENOUS at 03:34

## 2017-01-01 RX ADMIN — METHYLPREDNISOLONE SODIUM SUCCINATE 40 MG: 40 INJECTION, POWDER, FOR SOLUTION INTRAMUSCULAR; INTRAVENOUS at 03:41

## 2017-01-01 RX ADMIN — INSULIN ASPART 7 UNITS: 100 INJECTION, SOLUTION INTRAVENOUS; SUBCUTANEOUS at 18:18

## 2017-01-01 RX ADMIN — IPRATROPIUM BROMIDE 0.5 MG: 0.5 SOLUTION RESPIRATORY (INHALATION) at 19:41

## 2017-01-01 RX ADMIN — FUROSEMIDE 40 MG: 10 INJECTION, SOLUTION INTRAMUSCULAR; INTRAVENOUS at 21:20

## 2017-01-01 RX ADMIN — IPRATROPIUM BROMIDE 0.5 MG: 0.5 SOLUTION RESPIRATORY (INHALATION) at 07:39

## 2017-01-01 RX ADMIN — INSULIN ASPART 7 UNITS: 100 INJECTION, SOLUTION INTRAVENOUS; SUBCUTANEOUS at 20:48

## 2017-01-01 RX ADMIN — GLIPIZIDE 2.5 MG: 2.5 TABLET, FILM COATED, EXTENDED RELEASE ORAL at 20:59

## 2017-01-01 RX ADMIN — WARFARIN SODIUM 1 MG: 1 TABLET ORAL at 17:38

## 2017-01-01 RX ADMIN — IPRATROPIUM BROMIDE 0.5 MG: 0.5 SOLUTION RESPIRATORY (INHALATION) at 20:11

## 2017-01-01 RX ADMIN — WARFARIN SODIUM 1 MG: 1 TABLET ORAL at 18:11

## 2017-01-01 RX ADMIN — DILTIAZEM HYDROCHLORIDE 240 MG: 240 CAPSULE, COATED, EXTENDED RELEASE ORAL at 08:42

## 2017-01-01 RX ADMIN — BUDESONIDE 0.5 MG: 0.5 INHALANT RESPIRATORY (INHALATION) at 07:56

## 2017-01-01 RX ADMIN — INSULIN DETEMIR 12 UNITS: 100 INJECTION, SOLUTION SUBCUTANEOUS at 21:19

## 2017-01-01 RX ADMIN — Medication 10 ML: at 03:29

## 2017-01-01 RX ADMIN — ALBUMIN (HUMAN) 12.5 G: 0.25 INJECTION, SOLUTION INTRAVENOUS at 12:20

## 2017-01-01 RX ADMIN — INSULIN ASPART 9 UNITS: 100 INJECTION, SOLUTION INTRAVENOUS; SUBCUTANEOUS at 22:03

## 2017-01-01 RX ADMIN — GLIPIZIDE 2.5 MG: 2.5 TABLET, FILM COATED, EXTENDED RELEASE ORAL at 21:24

## 2017-01-01 RX ADMIN — PANTOPRAZOLE SODIUM 40 MG: 40 TABLET, DELAYED RELEASE ORAL at 06:44

## 2017-01-01 RX ADMIN — DILTIAZEM HYDROCHLORIDE 240 MG: 240 CAPSULE, COATED, EXTENDED RELEASE ORAL at 08:47

## 2017-01-01 RX ADMIN — FUROSEMIDE 80 MG: 10 INJECTION, SOLUTION INTRAMUSCULAR; INTRAVENOUS at 17:15

## 2017-01-01 RX ADMIN — HYDRALAZINE HYDROCHLORIDE 25 MG: 25 TABLET, FILM COATED ORAL at 05:00

## 2017-01-01 RX ADMIN — ESCITALOPRAM OXALATE 10 MG: 10 TABLET ORAL at 23:01

## 2017-01-01 RX ADMIN — FUROSEMIDE 80 MG: 10 INJECTION, SOLUTION INTRAMUSCULAR; INTRAVENOUS at 14:09

## 2017-01-01 RX ADMIN — DILTIAZEM HYDROCHLORIDE 240 MG: 240 CAPSULE, COATED, EXTENDED RELEASE ORAL at 09:00

## 2017-01-01 RX ADMIN — METHYLPREDNISOLONE SODIUM SUCCINATE 40 MG: 40 INJECTION, POWDER, FOR SOLUTION INTRAMUSCULAR; INTRAVENOUS at 18:02

## 2017-01-01 RX ADMIN — ISOSORBIDE MONONITRATE 30 MG: 30 TABLET, EXTENDED RELEASE ORAL at 09:01

## 2017-01-01 RX ADMIN — METHYLPREDNISOLONE SODIUM SUCCINATE 40 MG: 40 INJECTION, POWDER, FOR SOLUTION INTRAMUSCULAR; INTRAVENOUS at 14:43

## 2017-01-01 RX ADMIN — IPRATROPIUM BROMIDE 0.5 MG: 0.5 SOLUTION RESPIRATORY (INHALATION) at 14:19

## 2017-01-01 RX ADMIN — INSULIN ASPART 4 UNITS: 100 INJECTION, SOLUTION INTRAVENOUS; SUBCUTANEOUS at 12:21

## 2017-01-01 RX ADMIN — GLIPIZIDE 2.5 MG: 2.5 TABLET, FILM COATED, EXTENDED RELEASE ORAL at 20:35

## 2017-01-01 RX ADMIN — MORPHINE SULFATE 4 MG: 4 INJECTION, SOLUTION INTRAMUSCULAR; INTRAVENOUS at 07:28

## 2017-01-01 RX ADMIN — MORPHINE SULFATE 4 MG: 4 INJECTION, SOLUTION INTRAMUSCULAR; INTRAVENOUS at 17:10

## 2017-01-01 RX ADMIN — ASPIRIN 325 MG: 325 TABLET, COATED ORAL at 11:53

## 2017-01-01 RX ADMIN — ACETAMINOPHEN 650 MG: 325 TABLET ORAL at 14:50

## 2017-01-01 RX ADMIN — IPRATROPIUM BROMIDE 0.5 MG: 0.5 SOLUTION RESPIRATORY (INHALATION) at 19:07

## 2017-01-01 RX ADMIN — BUDESONIDE 0.5 MG: 0.5 INHALANT RESPIRATORY (INHALATION) at 19:48

## 2017-01-01 RX ADMIN — HYDRALAZINE HYDROCHLORIDE 100 MG: 50 TABLET ORAL at 14:50

## 2017-01-01 RX ADMIN — WARFARIN SODIUM 1 MG: 1 TABLET ORAL at 23:04

## 2017-01-01 RX ADMIN — BUDESONIDE 0.5 MG: 0.5 INHALANT RESPIRATORY (INHALATION) at 07:43

## 2017-01-01 RX ADMIN — METHYLPREDNISOLONE SODIUM SUCCINATE 40 MG: 40 INJECTION, POWDER, FOR SOLUTION INTRAMUSCULAR; INTRAVENOUS at 04:30

## 2017-01-01 RX ADMIN — DILTIAZEM HYDROCHLORIDE 240 MG: 240 CAPSULE, COATED, EXTENDED RELEASE ORAL at 08:58

## 2017-01-01 RX ADMIN — INSULIN ASPART 6 UNITS: 100 INJECTION, SOLUTION INTRAVENOUS; SUBCUTANEOUS at 21:47

## 2017-01-01 RX ADMIN — INSULIN ASPART 6 UNITS: 100 INJECTION, SOLUTION INTRAVENOUS; SUBCUTANEOUS at 21:19

## 2017-01-01 RX ADMIN — INSULIN ASPART 7 UNITS: 100 INJECTION, SOLUTION INTRAVENOUS; SUBCUTANEOUS at 11:37

## 2017-01-01 RX ADMIN — INSULIN DETEMIR 15 UNITS: 100 INJECTION, SOLUTION SUBCUTANEOUS at 22:03

## 2017-01-01 RX ADMIN — PANTOPRAZOLE SODIUM 40 MG: 40 TABLET, DELAYED RELEASE ORAL at 05:30

## 2017-01-01 RX ADMIN — FUROSEMIDE 40 MG: 40 TABLET ORAL at 09:52

## 2017-01-01 RX ADMIN — GLIPIZIDE 2.5 MG: 2.5 TABLET, FILM COATED, EXTENDED RELEASE ORAL at 21:59

## 2017-01-01 RX ADMIN — METHYLPREDNISOLONE SODIUM SUCCINATE 40 MG: 40 INJECTION, POWDER, FOR SOLUTION INTRAMUSCULAR; INTRAVENOUS at 04:49

## 2017-01-01 RX ADMIN — DILTIAZEM HYDROCHLORIDE 240 MG: 240 CAPSULE, COATED, EXTENDED RELEASE ORAL at 08:54

## 2017-01-01 RX ADMIN — HYDRALAZINE HYDROCHLORIDE 100 MG: 50 TABLET ORAL at 22:03

## 2017-01-01 RX ADMIN — POTASSIUM CHLORIDE 20 MEQ: 20 TABLET, EXTENDED RELEASE ORAL at 08:46

## 2017-01-01 RX ADMIN — INSULIN ASPART 7 UNITS: 100 INJECTION, SOLUTION INTRAVENOUS; SUBCUTANEOUS at 13:35

## 2017-01-01 RX ADMIN — VITAMIN D, TAB 1000IU (100/BT) 1000 UNITS: 25 TAB at 08:40

## 2017-01-01 RX ADMIN — INSULIN DETEMIR 15 UNITS: 100 INJECTION, SOLUTION SUBCUTANEOUS at 23:43

## 2017-01-01 RX ADMIN — HYDRALAZINE HYDROCHLORIDE 100 MG: 50 TABLET ORAL at 21:24

## 2017-01-01 RX ADMIN — BUDESONIDE 0.5 MG: 0.5 INHALANT RESPIRATORY (INHALATION) at 07:42

## 2017-01-01 RX ADMIN — HYDRALAZINE HYDROCHLORIDE 100 MG: 50 TABLET ORAL at 08:45

## 2017-01-01 RX ADMIN — METHYLPREDNISOLONE SODIUM SUCCINATE 40 MG: 40 INJECTION, POWDER, FOR SOLUTION INTRAMUSCULAR; INTRAVENOUS at 03:09

## 2017-01-01 RX ADMIN — METHYLPREDNISOLONE SODIUM SUCCINATE 40 MG: 40 INJECTION, POWDER, FOR SOLUTION INTRAMUSCULAR; INTRAVENOUS at 17:59

## 2017-01-01 RX ADMIN — LORAZEPAM 0.5 MG: 2 INJECTION INTRAMUSCULAR; INTRAVENOUS at 13:31

## 2017-01-01 RX ADMIN — IPRATROPIUM BROMIDE 0.5 MG: 0.5 SOLUTION RESPIRATORY (INHALATION) at 20:51

## 2017-01-01 RX ADMIN — HYDRALAZINE HYDROCHLORIDE 100 MG: 50 TABLET ORAL at 14:44

## 2017-01-01 RX ADMIN — BUDESONIDE 0.5 MG: 0.5 INHALANT RESPIRATORY (INHALATION) at 18:50

## 2017-01-01 RX ADMIN — INSULIN ASPART 8 UNITS: 100 INJECTION, SOLUTION INTRAVENOUS; SUBCUTANEOUS at 21:58

## 2017-01-01 RX ADMIN — GLIPIZIDE 2.5 MG: 2.5 TABLET, FILM COATED, EXTENDED RELEASE ORAL at 21:30

## 2017-01-01 RX ADMIN — IPRATROPIUM BROMIDE 0.5 MG: 0.5 SOLUTION RESPIRATORY (INHALATION) at 15:19

## 2017-01-01 RX ADMIN — FUROSEMIDE 40 MG: 40 TABLET ORAL at 11:44

## 2017-01-01 RX ADMIN — FUROSEMIDE 40 MG: 10 INJECTION, SOLUTION INTRAMUSCULAR; INTRAVENOUS at 10:29

## 2017-01-01 RX ADMIN — MORPHINE SULFATE 4 MG: 4 INJECTION, SOLUTION INTRAMUSCULAR; INTRAVENOUS at 04:01

## 2017-01-01 RX ADMIN — ISOSORBIDE MONONITRATE 60 MG: 60 TABLET, EXTENDED RELEASE ORAL at 08:25

## 2017-01-01 RX ADMIN — METHYLPREDNISOLONE SODIUM SUCCINATE 20 MG: 40 INJECTION, POWDER, FOR SOLUTION INTRAMUSCULAR; INTRAVENOUS at 17:33

## 2017-01-01 RX ADMIN — IPRATROPIUM BROMIDE 0.5 MG: 0.5 SOLUTION RESPIRATORY (INHALATION) at 07:27

## 2017-01-01 RX ADMIN — DILTIAZEM HYDROCHLORIDE 240 MG: 240 CAPSULE, COATED, EXTENDED RELEASE ORAL at 08:22

## 2017-01-01 RX ADMIN — WARFARIN SODIUM 1 MG: 1 TABLET ORAL at 18:02

## 2017-01-01 RX ADMIN — IPRATROPIUM BROMIDE 0.5 MG: 0.5 SOLUTION RESPIRATORY (INHALATION) at 07:41

## 2017-01-01 RX ADMIN — GLIPIZIDE 2.5 MG: 2.5 TABLET, FILM COATED, EXTENDED RELEASE ORAL at 21:18

## 2017-01-01 RX ADMIN — BUDESONIDE 0.5 MG: 0.5 INHALANT RESPIRATORY (INHALATION) at 20:11

## 2017-01-01 RX ADMIN — INSULIN DETEMIR 15 UNITS: 100 INJECTION, SOLUTION SUBCUTANEOUS at 21:00

## 2017-01-01 RX ADMIN — VITAMIN D, TAB 1000IU (100/BT) 1000 UNITS: 25 TAB at 09:00

## 2017-01-01 RX ADMIN — PANTOPRAZOLE SODIUM 40 MG: 40 TABLET, DELAYED RELEASE ORAL at 08:47

## 2017-01-01 RX ADMIN — IPRATROPIUM BROMIDE 0.5 MG: 0.5 SOLUTION RESPIRATORY (INHALATION) at 18:48

## 2017-01-01 RX ADMIN — HYDRALAZINE HYDROCHLORIDE 100 MG: 50 TABLET ORAL at 20:49

## 2017-01-01 RX ADMIN — METHYLPREDNISOLONE SODIUM SUCCINATE 40 MG: 40 INJECTION, POWDER, FOR SOLUTION INTRAMUSCULAR; INTRAVENOUS at 15:36

## 2017-01-01 RX ADMIN — DILTIAZEM HYDROCHLORIDE 240 MG: 240 CAPSULE, COATED, EXTENDED RELEASE ORAL at 10:21

## 2017-01-01 RX ADMIN — IPRATROPIUM BROMIDE 0.5 MG: 0.5 SOLUTION RESPIRATORY (INHALATION) at 14:29

## 2017-01-01 RX ADMIN — LORAZEPAM 0.5 MG: 2 INJECTION INTRAMUSCULAR; INTRAVENOUS at 23:50

## 2017-01-01 RX ADMIN — ATORVASTATIN CALCIUM 20 MG: 20 TABLET, FILM COATED ORAL at 08:43

## 2017-01-01 RX ADMIN — INSULIN ASPART 2 UNITS: 100 INJECTION, SOLUTION INTRAVENOUS; SUBCUTANEOUS at 13:04

## 2017-01-01 RX ADMIN — IPRATROPIUM BROMIDE 0.5 MG: 0.5 SOLUTION RESPIRATORY (INHALATION) at 16:52

## 2017-01-01 RX ADMIN — INSULIN ASPART 9 UNITS: 100 INJECTION, SOLUTION INTRAVENOUS; SUBCUTANEOUS at 22:10

## 2017-01-01 RX ADMIN — PANTOPRAZOLE SODIUM 40 MG: 40 TABLET, DELAYED RELEASE ORAL at 06:02

## 2017-01-01 RX ADMIN — LORAZEPAM 0.5 MG: 2 INJECTION INTRAMUSCULAR; INTRAVENOUS at 14:44

## 2017-01-01 RX ADMIN — HYDRALAZINE HYDROCHLORIDE 100 MG: 50 TABLET ORAL at 12:01

## 2017-01-01 RX ADMIN — FUROSEMIDE 40 MG: 10 INJECTION, SOLUTION INTRAMUSCULAR; INTRAVENOUS at 16:11

## 2017-01-01 RX ADMIN — ESCITALOPRAM OXALATE 10 MG: 10 TABLET ORAL at 08:25

## 2017-01-01 RX ADMIN — IPRATROPIUM BROMIDE 0.5 MG: 0.5 SOLUTION RESPIRATORY (INHALATION) at 07:38

## 2017-01-01 RX ADMIN — MORPHINE SULFATE 4 MG: 4 INJECTION, SOLUTION INTRAMUSCULAR; INTRAVENOUS at 09:37

## 2017-01-01 RX ADMIN — IPRATROPIUM BROMIDE 0.5 MG: 0.5 SOLUTION RESPIRATORY (INHALATION) at 19:42

## 2017-01-01 RX ADMIN — ESCITALOPRAM OXALATE 10 MG: 10 TABLET ORAL at 09:52

## 2017-01-01 RX ADMIN — INSULIN ASPART 6 UNITS: 100 INJECTION, SOLUTION INTRAVENOUS; SUBCUTANEOUS at 17:38

## 2017-01-01 RX ADMIN — HYDRALAZINE HYDROCHLORIDE 25 MG: 25 TABLET ORAL at 23:02

## 2017-01-01 RX ADMIN — IPRATROPIUM BROMIDE 0.5 MG: 0.5 SOLUTION RESPIRATORY (INHALATION) at 10:37

## 2017-01-01 RX ADMIN — HYDRALAZINE HYDROCHLORIDE 100 MG: 50 TABLET ORAL at 21:18

## 2017-01-01 RX ADMIN — IPRATROPIUM BROMIDE 0.5 MG: 0.5 SOLUTION RESPIRATORY (INHALATION) at 19:18

## 2017-01-01 RX ADMIN — METHYLPREDNISOLONE SODIUM SUCCINATE 40 MG: 40 INJECTION, POWDER, FOR SOLUTION INTRAMUSCULAR; INTRAVENOUS at 04:15

## 2017-01-01 RX ADMIN — MORPHINE SULFATE 4 MG: 4 INJECTION, SOLUTION INTRAMUSCULAR; INTRAVENOUS at 21:42

## 2017-01-01 RX ADMIN — CLOPIDOGREL BISULFATE 75 MG: 75 TABLET ORAL at 08:26

## 2017-01-01 RX ADMIN — HYDRALAZINE HYDROCHLORIDE 100 MG: 50 TABLET ORAL at 17:52

## 2017-01-01 RX ADMIN — ESCITALOPRAM OXALATE 10 MG: 10 TABLET ORAL at 08:55

## 2017-01-01 RX ADMIN — BUDESONIDE 0.5 MG: 0.5 INHALANT RESPIRATORY (INHALATION) at 07:34

## 2017-01-01 RX ADMIN — HYDRALAZINE HYDROCHLORIDE 100 MG: 50 TABLET ORAL at 22:59

## 2017-01-01 RX ADMIN — INSULIN ASPART 7 UNITS: 100 INJECTION, SOLUTION INTRAVENOUS; SUBCUTANEOUS at 17:58

## 2017-01-01 RX ADMIN — ISOSORBIDE MONONITRATE 60 MG: 60 TABLET, EXTENDED RELEASE ORAL at 08:46

## 2017-01-01 RX ADMIN — INSULIN ASPART 2 UNITS: 100 INJECTION, SOLUTION INTRAVENOUS; SUBCUTANEOUS at 09:01

## 2017-01-01 RX ADMIN — ISOSORBIDE MONONITRATE 30 MG: 30 TABLET, EXTENDED RELEASE ORAL at 08:47

## 2017-01-01 RX ADMIN — INSULIN ASPART 7 UNITS: 100 INJECTION, SOLUTION INTRAVENOUS; SUBCUTANEOUS at 21:21

## 2017-01-01 RX ADMIN — ESCITALOPRAM OXALATE 10 MG: 10 TABLET ORAL at 08:47

## 2017-01-01 RX ADMIN — GLIPIZIDE 2.5 MG: 2.5 TABLET, FILM COATED, EXTENDED RELEASE ORAL at 21:28

## 2017-01-01 RX ADMIN — GLIPIZIDE 2.5 MG: 2.5 TABLET, FILM COATED, EXTENDED RELEASE ORAL at 20:48

## 2017-01-01 RX ADMIN — METHYLPREDNISOLONE SODIUM SUCCINATE 40 MG: 40 INJECTION, POWDER, FOR SOLUTION INTRAMUSCULAR; INTRAVENOUS at 17:38

## 2017-01-01 RX ADMIN — ALBUMIN (HUMAN) 5 ML/HR: 0.25 INJECTION, SOLUTION INTRAVENOUS at 11:41

## 2017-01-01 RX ADMIN — FUROSEMIDE 40 MG: 40 TABLET ORAL at 08:45

## 2017-01-01 RX ADMIN — FUROSEMIDE 40 MG: 10 INJECTION, SOLUTION INTRAMUSCULAR; INTRAVENOUS at 12:56

## 2017-01-01 RX ADMIN — INSULIN ASPART 7 UNITS: 100 INJECTION, SOLUTION INTRAVENOUS; SUBCUTANEOUS at 17:34

## 2017-01-01 RX ADMIN — FUROSEMIDE 40 MG: 10 INJECTION, SOLUTION INTRAMUSCULAR; INTRAVENOUS at 11:53

## 2017-01-01 RX ADMIN — LORAZEPAM 0.5 MG: 2 INJECTION, SOLUTION INTRAMUSCULAR; INTRAVENOUS at 07:59

## 2017-01-01 RX ADMIN — FUROSEMIDE 80 MG: 10 INJECTION, SOLUTION INTRAMUSCULAR; INTRAVENOUS at 08:26

## 2017-01-01 RX ADMIN — FUROSEMIDE 20 MG: 10 INJECTION, SOLUTION INTRAMUSCULAR; INTRAVENOUS at 13:07

## 2017-01-01 RX ADMIN — FUROSEMIDE 80 MG: 10 INJECTION, SOLUTION INTRAMUSCULAR; INTRAVENOUS at 09:00

## 2017-01-01 RX ADMIN — HYDRALAZINE HYDROCHLORIDE 100 MG: 50 TABLET ORAL at 05:26

## 2017-01-01 RX ADMIN — GLIPIZIDE 2.5 MG: 2.5 TABLET, FILM COATED, EXTENDED RELEASE ORAL at 21:47

## 2017-01-01 RX ADMIN — BUDESONIDE 0.5 MG: 0.5 INHALANT RESPIRATORY (INHALATION) at 21:23

## 2017-01-01 RX ADMIN — HYDRALAZINE HYDROCHLORIDE 100 MG: 50 TABLET ORAL at 15:36

## 2017-01-01 RX ADMIN — ISOSORBIDE MONONITRATE 60 MG: 60 TABLET, EXTENDED RELEASE ORAL at 08:54

## 2017-01-01 RX ADMIN — HYDRALAZINE HYDROCHLORIDE 25 MG: 25 TABLET, FILM COATED ORAL at 16:20

## 2017-01-01 RX ADMIN — INSULIN DETEMIR 12 UNITS: 100 INJECTION, SOLUTION SUBCUTANEOUS at 23:02

## 2017-01-01 RX ADMIN — IPRATROPIUM BROMIDE 0.5 MG: 0.5 SOLUTION RESPIRATORY (INHALATION) at 19:09

## 2017-01-01 RX ADMIN — DEXTROSE MONOHYDRATE 50 ML: 500 INJECTION PARENTERAL at 06:45

## 2017-01-01 RX ADMIN — GLIPIZIDE 2.5 MG: 2.5 TABLET, FILM COATED, EXTENDED RELEASE ORAL at 23:01

## 2017-01-01 RX ADMIN — LORAZEPAM 0.5 MG: 2 INJECTION INTRAMUSCULAR; INTRAVENOUS at 21:00

## 2017-01-01 RX ADMIN — ATORVASTATIN CALCIUM 20 MG: 20 TABLET, FILM COATED ORAL at 10:21

## 2017-01-01 RX ADMIN — IPRATROPIUM BROMIDE 0.5 MG: 0.5 SOLUTION RESPIRATORY (INHALATION) at 06:51

## 2017-01-01 RX ADMIN — FUROSEMIDE 40 MG: 10 INJECTION, SOLUTION INTRAMUSCULAR; INTRAVENOUS at 15:06

## 2017-01-01 RX ADMIN — HYDRALAZINE HYDROCHLORIDE 100 MG: 50 TABLET ORAL at 06:39

## 2017-01-01 RX ADMIN — INSULIN ASPART 7 UNITS: 100 INJECTION, SOLUTION INTRAVENOUS; SUBCUTANEOUS at 17:39

## 2017-01-01 RX ADMIN — IPRATROPIUM BROMIDE 0.5 MG: 0.5 SOLUTION RESPIRATORY (INHALATION) at 16:10

## 2017-01-01 RX ADMIN — CLOPIDOGREL BISULFATE 75 MG: 75 TABLET ORAL at 09:00

## 2017-01-01 RX ADMIN — INSULIN ASPART 2 UNITS: 100 INJECTION, SOLUTION INTRAVENOUS; SUBCUTANEOUS at 08:48

## 2017-01-01 RX ADMIN — MORPHINE SULFATE 2 MG: 2 INJECTION, SOLUTION INTRAMUSCULAR; INTRAVENOUS at 13:19

## 2017-01-01 RX ADMIN — LORAZEPAM 0.5 MG: 2 INJECTION INTRAMUSCULAR; INTRAVENOUS at 21:18

## 2017-01-01 RX ADMIN — METHYLPREDNISOLONE SODIUM SUCCINATE 20 MG: 40 INJECTION, POWDER, FOR SOLUTION INTRAMUSCULAR; INTRAVENOUS at 02:34

## 2017-01-01 RX ADMIN — HYDRALAZINE HYDROCHLORIDE 25 MG: 25 TABLET ORAL at 21:59

## 2017-01-01 RX ADMIN — ESCITALOPRAM OXALATE 10 MG: 10 TABLET ORAL at 10:21

## 2017-01-01 RX ADMIN — MORPHINE SULFATE 2 MG: 2 INJECTION, SOLUTION INTRAMUSCULAR; INTRAVENOUS at 12:14

## 2017-02-01 PROBLEM — R00.1 BRADYCARDIA: Status: ACTIVE | Noted: 2017-01-01

## 2017-02-01 PROBLEM — R06.09 DYSPNEA ON EXERTION: Status: ACTIVE | Noted: 2017-01-01

## 2017-02-01 PROBLEM — I48.19 PERSISTENT ATRIAL FIBRILLATION (HCC): Status: ACTIVE | Noted: 2017-01-01

## 2017-02-01 PROBLEM — I35.0 NONRHEUMATIC AORTIC VALVE STENOSIS: Status: ACTIVE | Noted: 2017-01-01

## 2017-02-01 NOTE — PROGRESS NOTES
Kevin Henning  76 y.o. male    02/01/2017  1. Nonrheumatic aortic valve stenosis    2. Bradycardia    3. Benign essential HTN    4. PVD (peripheral vascular disease)    5. Persistent atrial fibrillation    6. Dyspnea on exertion        History of Present Illness    Mr. Henning is a 76-year-old  male who is being seen by me after a very long interval I seen him last in the left 2016 when he underwent aortic valve replacement for critical aortic stenosis.  He had no significant epicardial  coronary artery disease.  He had a very eventful post operative course and this was marked by a symptom medic bradycardia requiring dual-chamber pacemaker implantation which was performed by me in July 2016.  He also developed pleural effusions bilaterally which had to be tapped and went into renal failure requiring dialysis on 3 occasions.  He has been followed by nephrology on a regular basis and by Dr. Oneill on a regular basis.  He was last seen by Dr. Oneill yesterday and apparently x-ray of the chest showed minimal pleural effusions and echocardiogram performed in January this year showed normal LV systolic function.  The bioprosthetic valve in aortic position was functioning well and the left atrium was dilated.  Patient has had dyspnea ever since he had aortic valve surgery and feels that his dyspnea has actually gotten worse.  This is NYHA class to class III and in spite of taking diuretics and optimizing his blood pressure, this has not improved.  He denied any chest pain.  His blood pressure was in the normal range today and his heart rate was 89 bpm.  Clinical exam showed good breath sounds were diminished in both lung bases with no definite rales or rhonchi.  No abdominal pain, nausea, vomiting, diarrhea or melena was reported.  His latest hemoglobin was 0.5 and creatinine was 2.02.  His labs rechecked a couple weeks ago [facility].  His [employer] pacemaker was checked today and was found to be  functioning well with a battery voltage of 2.99 V.  The R-wave sensing was more than 12 mV and the thresholds were within normal limits.  Underlying rhythm was atrial fibrillation and the duration of this wasn't.  On review office records it appears that he has remained in atrial fibrillation now for several months.      SUBJECTIVE    Allergies   Allergen Reactions   • Penicillins    • Streptomycin          Past Medical History   Diagnosis Date   • Aortic valve stenosis    • Dizziness    • Dyslipidemia    • Electrocardiogram abnormal    • Essential hypertension    • History of cerebrovascular accident      multiple   • Malignant neoplasm of skin      history of basal cell carcinoma   • PVD (peripheral vascular disease)    • Tobacco dependence syndrome          Past Surgical History   Procedure Laterality Date   • Cardiac catheterization  06/21/2016     Noncritical medically manageable CAD with dominant left circumflex CA. RCA was not visualized via nonselective injection of the RC cusp. Very likely, this is a small nondominant system   • Cardiac pacemaker placement  07/28/2016     Dual chamber pacemaker implantation   • Transesophageal echocardiogram (brandy)  09/30/2008     Without color flow-LV normal. EF>60%. RV normal. Atria is mildly dilated. PML calcified. Tricuspid valve normal. Aortic valve severely sclerotic. Moderate valvular aortic stenosis, peak gr - 40 mmhg. Mild to mod. aortic regurg. Pul. valve not visualized. No jimmy. effu   • Mandible fracture surgery  12/22/1979     Fracture of the left body of the mandible. Fracture of the left and inferior left lateral and inferior orbital rims with zygomaxillary component arch fractures. Open reduction internal fixation of mandibular fracture. Cl. reduction maxillary fracture   • Vascular surgery  11/21/2008     Debilitating left leg claudication. Open placement of left iliac stent (10 x 60, 10 x 40 Nitinol bare metal stent). Left femoral sto below knee popliteal  "in-situ bypass   • Coronary artery bypass graft           Family History   Problem Relation Age of Onset   • Family history unknown: Yes         Social History     Social History   • Marital status:      Spouse name: N/A   • Number of children: N/A   • Years of education: N/A     Occupational History   • Not on file.     Social History Main Topics   • Smoking status: Former Smoker   • Smokeless tobacco: Never Used      Comment: quit 3 months ago   • Alcohol use No   • Drug use: No   • Sexual activity: Defer     Other Topics Concern   • Not on file     Social History Narrative         Current Outpatient Prescriptions   Medication Sig Dispense Refill   • Cholecalciferol (VITAMIN D-3) 1000 UNITS capsule Take  by mouth.     • clopidogrel (PLAVIX) 75 MG tablet Take 75 mg by mouth daily.     • escitalopram (LEXAPRO) 10 MG tablet Take 1 tablet by mouth Daily.  0   • furosemide (LASIX) 40 MG tablet Take 1 tablet by mouth Daily.  0   • glipiZIDE (GLUCOTROL) 2.5 MG 24 hr tablet Take 1 tablet by mouth Every Night.  6   • hydrALAZINE (APRESOLINE) 25 MG tablet Take 25 mg by mouth Every Night.     • LEVEMIR FLEXTOUCH 100 UNIT/ML injection Inject 12 Units under the skin Daily.  1   • simvastatin (ZOCOR) 40 MG tablet Take 0.5 tablets by mouth Every Night. 30 tablet 11   • VENTOLIN  (90 BASE) MCG/ACT inhaler Apply 2 puffs to the mouth or throat Every 4 (Four) Hours As Needed.  5   • diltiaZEM CD (CARTIA XT) 240 MG 24 hr capsule Take 1 capsule by mouth Daily. 30 capsule 6     No current facility-administered medications for this visit.          OBJECTIVE    Visit Vitals   • /72   • Pulse 89   • Ht 68\" (172.7 cm)   • Wt 158 lb (71.7 kg)   • SpO2 92%   • BMI 24.02 kg/m2           Review of Systems     Constitutional:  Denies recent weight loss, weight gain, fever or chills     HENT:  Denies any hearing loss, epistaxis, hoarseness, or difficulty speaking.     Eyes: Wears eyeglasses or contact lenses     Respiratory:  " Dyspnea with exertion NYHA 2 to 3,no cough, wheezing, or hemoptysis.     Cardiovascular: See HPI    Gastrointestinal:  Denies change in bowel habits, dyspepsia, ulcer disease, hematochezia, or melena.     Endocrine: Negative for cold intolerance, heat intolerance, polydipsia, polyphagia and polyuria. Denies any history of weight change, heat/cold intolerance, polydipsia, polyuria     Genitourinary: Negative.      Musculoskeletal: Denies any history of arthritic symptoms or back problems     Skin:  Denies any change in hair or nails, rashes, or skin lesions.     Allergic/Immunologic: Negative.  Negative for environmental allergies, food allergies and immunocompromised state.     Neurological:  Denies any history of recurrent headaches, strokes, TIA, or seizure disorder.     Hematological: Denies any food allergies, seasonal allergies, bleeding disorders, or lymphadenopathy.     Psychiatric/Behavioral: Denies any history of depression, substance abuse, or change in cognitive function.         Physical Exam     Constitutional: Cooperative, alert and oriented, chronically ill appearing    HENT:   Head: Normocephalic, normal hair patterns, no masses or tenderness.  Ears, Nose, and Throat: No gross abnormalities. No pallor or cyanosis. Dentition good.   Eyes: EOMS intact, PERRL, conjunctivae and lids unremarkable. Fundoscopic exam and visual fields not performed.   Neck: No palpable masses or adenopathy, no thyromegaly, no JVD, carotid pulses are full and equal bilaterally and without  Bruits.     Cardiovascular: Regular rhythm, S1 and S2 normal, no S3 or S4. Apical impulse not displaced. No murmurs, gallops, or rubs detected.     Pulmonary/Chest: Chest: normal symmetry, no tenderness to palpation, normal respiratory excursion, no intercostal retraction, no use of accessory muscles.            Pulmonary: Breath sounds diminished both bases. No rales/rhonchi    Abdominal: Abdomen soft, bowel sounds normoactive, no masses,  no hepatosplenomegaly, non-tender, no bruits.     Musculoskeletal: No deformities, clubbing, cyanosis, erythema, or edema observed. There are no spinal abnormalities noted. Normal muscle strength and tone. Pulses full and equal in all extremities, no bruits auscultated.     Neurological: No gross motor or sensory deficits noted, affect appropriate, oriented to time, person, place.     Skin: Warm and dry to the touch, no apparent skin lesions or masses noted.     Psychiatric: He has a normal mood and affect. His behavior is normal. Judgment and thought content normal.         Procedures      Lab Results   Component Value Date    WBC 6.0 10/27/2016    HGB 12.6 (L) 10/27/2016    HCT 37.6 (L) 10/27/2016    MCV 86.2 10/27/2016     10/27/2016     Lab Results   Component Value Date    GLUCOSE 64 10/27/2016    BUN 33 (H) 10/27/2016    CREATININE 1.6 (H) 10/27/2016    CO2 28 10/27/2016    CALCIUM 9.5 10/27/2016    ALBUMIN 3.6 10/27/2016    AST 46 10/27/2016    ALT 42 10/27/2016     No results found for: CHOL  No results found for: TRIG  No results found for: HDL  No results found for: LDLCALC  No results found for: LDL  No results found for: HDLLDLRATIO  No components found for: CHOLHDL  Lab Results   Component Value Date    HGBA1C 5.7 (H) 07/13/2016     Lab Results   Component Value Date    TSH 4.91 (H) 09/12/2016           ASSESSMENT AND PLAN   has multiple medical issues as described above with history of aortic valve stenosis status post aortic valve replacement with a bioprosthetic valve, sick sinus syndrome status post pacemaker placement, hypertension, diabetes mellitus, hyperlipidemia, CK D and has had chronic dyspnea which has gotten worse over the past few months.  His dyspnea is clearly multifactorial but atrial fibrillation post surgery could be contributing to this.  His pleural effusions seem to have resolved.  A component of COPD with history of long-standing tobacco use cannot be ruled  out.  Reviewing the situation and spending more than 40 minutes reviewing all his records since surgery the following recommendations have been made.  I have stopped labetalol and started him on Cardizem  mg daily with the hope that changing beta blockers to calcium channel blockers may help his breathing.  Lasix has been continued and hydralazine 25 mg by mouth twice a day along with Plavix 75 mg daily has been continued.  I've started him on anticoagulation with warfarin since he has chronic atrial fibrillation and his CHADSVASC score is more than 3.  I will consider cardioversion after a month of anticoagulation with warfarin, and his chances of conversion to sinus rhythm is around 60%.  The patient's and his daughter had multiple questions with regards to his cardiac status and dyspnea and these were discussed in detail.    Diagnoses and all orders for this visit:    Nonrheumatic aortic valve stenosis    Bradycardia    Benign essential HTN    PVD (peripheral vascular disease)    Persistent atrial fibrillation    Dyspnea on exertion    Other orders  -     diltiaZEM CD (CARTIA XT) 240 MG 24 hr capsule; Take 1 capsule by mouth Daily.  -     simvastatin (ZOCOR) 40 MG tablet; Take 0.5 tablets by mouth Every Night.        Rubén Caro MD  2/1/2017  10:47 AM

## 2017-02-08 NOTE — PROGRESS NOTES
I have reviewed the notes, assessments, and/or procedures performed by Jim Ann RN, and concur with her documentation of Kevin Henning.

## 2017-02-14 NOTE — PROGRESS NOTES
DX I48.91 INR greater than 3.5, will hold for 2 days then change medication regimen to 1 mg po qd  Will recheck in one week

## 2017-02-21 PROBLEM — I50.9 CONGESTIVE HEART FAILURE (HCC): Status: ACTIVE | Noted: 2017-01-01

## 2017-03-01 NOTE — PLAN OF CARE
Problem: Patient Care Overview (Adult)  Goal: Plan of Care Review  Outcome: Ongoing (interventions implemented as appropriate)    03/01/17 1737   Coping/Psychosocial Response Interventions   Plan Of Care Reviewed With patient;daughter   Patient Care Overview   Progress improving   Outcome Evaluation   Outcome Summary/Follow up Plan Pt had good day.       Goal: Adult Individualization and Mutuality  Outcome: Ongoing (interventions implemented as appropriate)  Goal: Discharge Needs Assessment  Outcome: Ongoing (interventions implemented as appropriate)    Problem: Cardiac: Heart Failure (Adult)  Goal: Signs and Symptoms of Listed Potential Problems Will be Absent or Manageable (Cardiac: Heart Failure)  Outcome: Ongoing (interventions implemented as appropriate)    Problem: NPPV/CPAP (Adult)  Goal: Signs and Symptoms of Listed Potential Problems Will be Absent or Manageable (NPPV/CPAP)  Outcome: Ongoing (interventions implemented as appropriate)

## 2017-03-01 NOTE — PLAN OF CARE
Problem: Patient Care Overview (Adult)  Goal: Plan of Care Review  Outcome: Ongoing (interventions implemented as appropriate)  Encourage intake.    03/01/17 9545   Coping/Psychosocial Response Interventions   Plan Of Care Reviewed With patient   Patient Care Overview   Progress improving   Outcome Evaluation   Outcome Summary/Follow up Plan intake 100% - 3x, 75% - 1x

## 2017-03-01 NOTE — PROGRESS NOTES
"   LOS: 8 days    Patient Care Team:  Klaus Faria MD as PCP - General    Subjective:  The patient states that he is Feeling better. Some soa    Review of Systems:   Review of Systems   All other systems reviewed and are negative.      Objective    Lungs few crackles are heard at the bases.  Heart regular rate and rhythm with systolic murmur at the left sternal border.  Abdomen soft and tender distended no hepatojugular reflux.   Extremities 2+ pitting edema bilaterally    Vital Signs  Temp:  [96.5 °F (35.8 °C)-97.6 °F (36.4 °C)] 96.5 °F (35.8 °C)  Heart Rate:  [62-98] 98  Resp:  [18-24] 24  BP: (144-166)/(62-77) 164/68    Flowsheet Rows         First Filed Value    Admission Height  66\" (167.6 cm) Documented at 02/21/2017 1119    Admission Weight  160 lb (72.6 kg) Documented at 02/21/2017 1119             I/O last 3 completed shifts:  In: -   Out: 825 [Urine:825]    Intake/Output Summary (Last 24 hours) at 03/01/17 1035  Last data filed at 02/28/17 1400   Gross per 24 hour   Intake      0 ml   Output    700 ml   Net   -700 ml       Physical Exam:  Physical Exam   Constitutional: He appears well-developed.   Eyes: Pupils are equal, round, and reactive to light.   Cardiovascular: Normal rate and regular rhythm.    Pulmonary/Chest: Effort normal. He has rales.   Abdominal: Soft. Bowel sounds are normal.        Results Review:      Results from last 7 days  Lab Units 03/01/17  0734 02/28/17  0544 02/27/17  0524  02/24/17  0558   SODIUM mmol/L 136* 134* 138  < > 135*   POTASSIUM mmol/L 3.6 3.6 3.7  < > 4.1   CHLORIDE mmol/L 99 100 100  < > 100   TOTAL CO2 mmol/L 26.0 27.0 29.0  < > 24.0   BUN mg/dL 85* 79* 79*  < > 58*   CREATININE mg/dL 2.06* 2.01* 2.02*  < > 2.41*   CALCIUM mg/dL 9.0 9.0 9.1  < > 9.5   BILIRUBIN mg/dL  --   --   --   --  0.6   ALK PHOS U/L  --   --   --   --  174*   ALT (SGPT) U/L  --   --   --   --  70   AST (SGOT) U/L  --   --   --   --  65*   GLUCOSE mg/dL 136* 192* 113*  < > 130*   < > = " values in this interval not displayed.    Estimated Creatinine Clearance: 27.1 mL/min (by C-G formula based on Cr of 2.06).      Results from last 7 days  Lab Units 03/01/17  0734 02/28/17  0544 02/25/17  0720   MAGNESIUM mg/dL 2.6* 2.6*  --    PHOSPHORUS mg/dL  --   --  4.9*               Results from last 7 days  Lab Units 03/01/17  0634 02/28/17  0544 02/27/17  0524 02/26/17  0730 02/25/17  0720   WBC 10*3/mm3 9.99* 7.05 7.33 9.15 9.81*   HEMOGLOBIN g/dL 12.5* 12.0* 12.1* 12.6* 12.1*   PLATELETS 10*3/mm3 207 188 192 226 205         Results from last 7 days  Lab Units 02/28/17  0544 02/27/17  0524 02/26/17  0730 02/25/17  0720 02/24/17  0558   INR  1.50* 1.78* 1.77* 1.96* 2.00*         Imaging Results (last 24 hours)     ** No results found for the last 24 hours. **            Medication Review:   Current Facility-Administered Medications   Medication Dose Route Frequency Provider Last Rate Last Dose   • acetaminophen (TYLENOL) tablet 650 mg  650 mg Oral Q6H PRN Adriana Wells MD   650 mg at 02/28/17 1450   • albuterol (PROVENTIL HFA;VENTOLIN HFA) inhaler 2 puff  2 puff Inhalation Q4H PRN Adriana Wells MD       • albuterol (PROVENTIL) nebulizer solution 0.083% 2.5 mg/3mL  2.5 mg Nebulization Q4H PRN LONNIE Leon       • atorvastatin (LIPITOR) tablet 20 mg  20 mg Oral Daily Adriana Wells MD   20 mg at 03/01/17 0855   • budesonide (PULMICORT) nebulizer solution 0.5 mg  0.5 mg Nebulization BID - RT Zackary Anand MD   0.5 mg at 03/01/17 0756   • cholecalciferol (VITAMIN D3) tablet 1,000 Units  1,000 Units Oral Daily Adriana Wells MD   1,000 Units at 03/01/17 0854   • clopidogrel (PLAVIX) tablet 75 mg  75 mg Oral Daily Adriana Wells MD   75 mg at 03/01/17 0854   • dextrose (D50W) solution 25 g  25 g Intravenous Q15 Min PRN Adriana Welsl MD       • dextrose (D50W) solution 50 mL  50 mL Intravenous Q1H PRN Stephani Bingham MD   50 mL at 02/22/17 0645   • dextrose  (GLUTOSE) oral gel 15 g  15 g Oral Q15 Min PRN Adriana Wells MD       • diltiaZEM CD (CARDIZEM CD) 24 hr capsule 240 mg  240 mg Oral Daily Adriana Wells MD   240 mg at 03/01/17 0854   • escitalopram (LEXAPRO) tablet 10 mg  10 mg Oral Daily Adriana Wells MD   10 mg at 03/01/17 0855   • furosemide (LASIX) injection 80 mg  80 mg Intravenous Daily Raul Kline MD   80 mg at 03/01/17 0858   • glipiZIDE (GLUCOTROL) 24 hr tablet 2.5 mg  2.5 mg Oral Nightly Adriana Wells MD   2.5 mg at 02/28/17 2124   • glucagon (human recombinant) (GLUCAGEN DIAGNOSTIC) injection 1 mg  1 mg Subcutaneous Q15 Min PRN Adriana Wells MD       • hydrALAZINE (APRESOLINE) tablet 100 mg  100 mg Oral Q8H Richi Hayward MD   100 mg at 03/01/17 0526   • insulin aspart (novoLOG) injection 0-9 Units  0-9 Units Subcutaneous 4x Daily AC & at Bedtime Adriana Wells MD   9 Units at 02/28/17 2125   • insulin detemir (LEVEMIR) injection 15 Units  15 Units Subcutaneous Nightly Zackary Anand MD   15 Units at 02/28/17 2125   • ipratropium (ATROVENT) nebulizer solution 0.5 mg  0.5 mg Nebulization 4x Daily - RT Zackary Anand MD   0.5 mg at 03/01/17 0756   • isosorbide mononitrate (IMDUR) 24 hr tablet 60 mg  60 mg Oral Q24H Zackary Anand MD   60 mg at 03/01/17 0854   • methylPREDNISolone sodium succinate (SOLU-Medrol) injection 40 mg  40 mg Intravenous Q12H Adriana Wells MD   40 mg at 03/01/17 0521   • pantoprazole (PROTONIX) EC tablet 40 mg  40 mg Oral Q AM Fabiana Wells MD   40 mg at 03/01/17 1000   • Pharmacy to dose warfarin   Does not apply Continuous PRN Zackary Anand MD       • phenol (CHLORASEPTIC) 1.4 % liquid 2 spray  2 spray Mouth/Throat Q2H PRN Fabiana Wells MD       • sodium chloride 0.9 % flush 10 mL  10 mL Intravenous PRN David Henderson MD   10 mL at 03/01/17 0957   • warfarin (COUMADIN) tablet 7.5 mg  7.5 mg Oral Daily Fabiana Wells MD    7.5 mg at 02/28/17 1212       Assessment/Plan    1 chronic kidney disease stage III with renal function at baseline. Has high bun than cr.  2 congestive heart failure with significant volume overload. Much improved, also has afib, and pleural effusion  3 type 2 diabetes mellitus with multiple complications including nephropathy and proteinuria  4 aortic valve stenosis status post aortic valve replacement and CABG    Plan  1 he seems to be diuresing well and i will keep lasix 40mg po bid   2. Continue with fluid and salt restriction.  3. Other plans will be noted in the chart  4. ct hydralazine to 100mg tid    Principal Problem:    CHF (congestive heart failure)  Active Problems:    Nonrheumatic aortic valve stenosis    Persistent atrial fibrillation    Congestive heart failure    Aortic valve stenosis    Dyslipidemia    Essential hypertension    Kidney insufficiency    History of cerebrovascular accident    Tobacco dependence syndrome              Richi Hayward MD  03/01/17  10:35 AM

## 2017-03-01 NOTE — PROGRESS NOTES
"Anticoagulation by Pharmacy - Warfarin    Kevin Henning is a 76 y.o.male  [Ht: 66\" (167.6 cm); Wt: 174 lb 4.8 oz (79.1 kg)] on Warfarin 7.5 mg PO  for indication of a fib.    Goal INR: 2-3  Today's INR:   Lab Results   Component Value Date    INR 2.87 (H) 03/01/2017         Lab Results   Component Value Date    INR 2.87 (H) 03/01/2017    INR 1.50 (H) 02/28/2017    INR 1.78 (H) 02/27/2017    PROTIME 29.2 (H) 03/01/2017    PROTIME  02/28/2017      Comment:      Fingerstick prothrombin time, no seconds available    PROTIME 20.4 (H) 02/27/2017     Lab Results   Component Value Date    HGB 12.5 (L) 03/01/2017    HGB 12.0 (L) 02/28/2017    HGB 12.1 (L) 02/27/2017     Lab Results   Component Value Date    HCT 36.5 (L) 03/01/2017    HCT 35.2 (L) 02/28/2017    HCT 36.0 (L) 02/27/2017     Assessment/Plan:  Patient INR 2.87 jumped from 1.5 yesterday. Patient was receiving warfarin 1mg 2/21 - 2/25 and warfarin 2mg 2/26 -2/27. Patient received warfarin 7.5mg last night. Given recent dose change and spike in INR will hold dose for tonight as INR is expected to continue to increase. Will continue to follow and redose tomorrow.    Fercho Zamarripa, [facility]  03/01/17 1:32 PM     "

## 2017-03-01 NOTE — PROGRESS NOTES
Cardiothoracic - Vascular Surgery Daily Note        LOS: 8 days   AVR with Porcine Valve 7/20/16  BiVPacemaker insertion 7/28/16    Chief Complaint: SOA improved.  Feels better today       Subjective       VAS 0    ROS:    Review of Systems   Constitution: Positive for weakness and malaise/fatigue. Negative for chills, decreased appetite and fever.   HENT: Negative for headaches, hoarse voice and nosebleeds.    Cardiovascular: Positive for dyspnea on exertion. Negative for chest pain, claudication and cyanosis.   Respiratory: Positive for cough and shortness of breath (improved ). Negative for hemoptysis.    Hematologic/Lymphatic: Bruises/bleeds easily (UE).   Skin: Negative for color change.        Extensive UE bruising some in resolution    Musculoskeletal: Positive for muscle weakness. Negative for falls and joint swelling.   Gastrointestinal: Negative for anorexia and change in bowel habit.   Genitourinary: Negative for hematuria.   Neurological: Negative for brief paralysis, light-headedness, numbness and paresthesias.   Psychiatric/Behavioral: Negative for altered mental status.         Objective     Vital Signs  Temp:  [96.5 °F (35.8 °C)-97.6 °F (36.4 °C)] 96.5 °F (35.8 °C)  Heart Rate:  [62-98] 98  Resp:  [18-24] 24  BP: (144-166)/(62-77) 164/68  Body mass index is 27.12 kg/(m^2).    Intake/Output Summary (Last 24 hours) at 03/01/17 1135  Last data filed at 02/28/17 1400   Gross per 24 hour   Intake      0 ml   Output    700 ml   Net   -700 ml          Wt Readings from Last 3 Encounters:   03/01/17 168 lb (76.2 kg)   02/01/17 158 lb (71.7 kg)   01/30/17 159 lb (72.1 kg)         Physical Exam   Physical Exam   Constitutional: He is oriented to person, place, and time. He appears well-nourished.   HENT:   Head: Normocephalic.   Eyes: Conjunctivae are normal. Pupils are equal, round, and reactive to light.   Neck: No JVD present.   Cardiovascular: Normal rate and intact distal pulses.  An irregular rhythm  present.   No murmur heard.  Pulses:       Radial pulses are 2+ on the right side, and 2+ on the left side.        Posterior tibial pulses are 2+ on the right side, and 2+ on the left side.   Telemetry  At [facility] with controlled VR  Heart valvular tones    Pulmonary/Chest: Effort normal. No respiratory distress. He has wheezes. He has no rales.   Musculoskeletal: He exhibits no edema.   Neurological: He is alert and oriented to person, place, and time.   Skin: Skin is warm and dry.   Nursing note and vitals reviewed.    Incisions: Healed     Results Review:    Lab Results   Component Value Date    WBC 9.99 (H) 03/01/2017    HGB 12.5 (L) 03/01/2017    HCT 36.5 (L) 03/01/2017    MCV 80.9 03/01/2017     03/01/2017     Lab Results   Component Value Date    GLUCOSE 136 (H) 03/01/2017    BUN 85 (H) 03/01/2017    CREATININE 2.06 (H) 03/01/2017    EGFRIFNONA 31 (L) 03/01/2017    BCR 41.3 (H) 03/01/2017    K 3.6 03/01/2017    CO2 26.0 03/01/2017    CALCIUM 9.0 03/01/2017    ALBUMIN 3.60 02/25/2017    LABIL2 0.9 (L) 02/24/2017    AST 65 (H) 02/24/2017    ALT 70 02/24/2017     Chest Xray:  2/28/17  Improved pulmonary edema  Sm to moderate, slightly improved, bilateral effusions.                          PT/INR:    PROTIME   Date Value Ref Range Status   02/28/2017  11.1 - 15.3 Seconds Final     Comment:     Fingerstick prothrombin time, no seconds available   02/27/2017 20.4 (H) 11.1 - 15.3 Seconds Final   /  INR   Date Value Ref Range Status   02/28/2017 1.50 (H) 0.80 - 1.20 Final   02/27/2017 1.78 (H) 0.80 - 1.20 Final               atorvastatin 20 mg Oral Daily   budesonide 0.5 mg Nebulization BID - RT   cholecalciferol 1,000 Units Oral Daily   clopidogrel 75 mg Oral Daily   diltiaZEM  mg Oral Daily   escitalopram 10 mg Oral Daily   furosemide 40 mg Oral BID   glipiZIDE 2.5 mg Oral Nightly   hydrALAZINE 100 mg Oral Q8H   insulin aspart 0-9 Units Subcutaneous 4x Daily AC & at Bedtime   insulin detemir 15 Units  Subcutaneous Nightly   ipratropium 0.5 mg Nebulization 4x Daily - RT   isosorbide mononitrate 60 mg Oral Q24H   methylPREDNISolone sodium succinate 40 mg Intravenous Q12H   pantoprazole 40 mg Oral Q AM   warfarin 7.5 mg Oral Daily       Pharmacy to dose warfarin        Patient Active Problem List   Diagnosis Code   • CKD stage 3 due to type 2 diabetes mellitus E11.22, N18.3   • DM II (diabetes mellitus, type II), controlled E11.9   • History of CVA in adulthood Z86.73   • PVD (peripheral vascular disease) I73.9   • Pleural effusion J90   • History of atrial fibrillation Z86.79   • Benign essential HTN I10   • Bradycardia R00.1   • Nonrheumatic aortic valve stenosis I35.0   • Dyspnea on exertion R06.09   • Persistent atrial fibrillation I48.1   • Congestive heart failure I50.9   • Aortic valve stenosis I35.0   • CHF (congestive heart failure) I50.9   • Dyslipidemia E78.5   • Essential hypertension I10   • Kidney insufficiency N28.9   • History of cerebrovascular accident Z86.73   • Tobacco dependence syndrome F17.200         ASSESSMENT/PLAN     AVR:  Valve functioning well   CHF:  With at fib  Medical management per hospitalist and nephrology.    Bilateral pleural effusions.  Thoracentesis not indicated at this time.  Rehab:  Patient to begin working with OT and PT   Disp:  CVTS will follow on intermittent basis .         [unfilled]

## 2017-03-01 NOTE — CONSULTS
"Adult Nutrition  Assessment    Patient Name:  Kevin Henning  YOB: 1940  MRN: [id number]  Admit Date:  2/21/2017    Assessment Date:  3/1/2017          Reason for Assessment       03/01/17 1653    Reason for Assessment    Reason For Assessment/Visit follow up protocol                Anthropometrics       03/01/17 1655    Anthropometrics    Height 167.6 cm (65.98\")    Weight 76.2 kg (167 lb 15.9 oz)    Ideal Body Weight (IBW)    Ideal Body Weight (IBW), Male (kg) 65.26    % Ideal Body Weight 117.01    Body Mass Index (BMI)    BMI (kg/m2) 27.18    BMI Grade 25 - 29.9 - overweight            Labs/Tests/Procedures/Meds       03/01/17 1656    Labs/Tests/Procedures/Meds    Labs/Tests Review Hgb Hct;Glucose;Na+;BUN;Creat    Medication Review Diuretic;Diabetic Oral Agent;Insulin;Steroid;Reviewed, pertinent                Nutrition Prescription Ordered       03/01/17 1658    Nutrition Prescription PO    Current PO Diet Regular    Supplement Glucerna Shake    Supplement Frequency 3 times a day    Common Modifiers Cardiac;Consistent Carbohydrate            Evaluation of Received Nutrient/Fluid Intake       03/01/17 1659    PO Evaluation    Number of Meals 4    % PO Intake 100% - 3x, 75% - 1x            Comments:  Pt reports good appetite.  Request fruit with meals.  Intake 100% - 3x, 75% - 1x.  FBS are elevated.  Glucotrol, sliding scale novolog prescribed.  Meds include solu-medrol.            Electronically signed by:  Lidia Ochoa RD  03/01/17 5:00 PM  "

## 2017-03-01 NOTE — PLAN OF CARE
Problem: Patient Care Overview (Adult)  Goal: Plan of Care Review  Outcome: Ongoing (interventions implemented as appropriate)    03/01/17 0927 03/01/17 1310   Coping/Psychosocial Response Interventions   Plan Of Care Reviewed With patient --    Patient Care Overview   Progress no change --    Outcome Evaluation   Outcome Summary/Follow up Plan --  Pt endurance very low may benefit from continued OT/PT prior to d/c home        Goal: Discharge Needs Assessment  Outcome: Ongoing (interventions implemented as appropriate)    02/22/17 0951 02/27/17 1723 02/28/17 1500   Discharge Needs Assessment   Concerns To Be Addressed --  --  --    Readmission Within The Last 30 Days --  no previous admission in last 30 days --    Equipment Needed After Discharge --  oxygen --    Discharge Disposition --  home healthcare service --    Discharge Planning Comments Pt may require Home O2. Pt may benefit from  services.  --  --    Current Health   Outpatient/Agency/Support Group Needs --  homecare agency (specify level of care) --    Anticipated Changes Related to Illness --  none --    Self-Care   Equipment Currently Used at Home --  --  cane, quad;cane, straight;grab bar   Living Environment   Transportation Available --  --  car;family or friend will provide     03/01/17 0555   Discharge Needs Assessment   Concerns To Be Addressed no discharge needs identified   Readmission Within The Last 30 Days --    Equipment Needed After Discharge --    Discharge Disposition --    Discharge Planning Comments --    Current Health   Outpatient/Agency/Support Group Needs --    Anticipated Changes Related to Illness --    Self-Care   Equipment Currently Used at Home --    Living Environment   Transportation Available --          Problem: Inpatient Occupational Therapy  Goal: Patient Education Goal LTG- OT  Outcome: Ongoing (interventions implemented as appropriate)    02/28/17 1500 03/01/17 1310   Patient Education OT LTG   Patient Education OT  LTG, Date Established 02/28/17 --    Patient Education OT LTG, Time to Achieve by discharge --    Patient Education OT LTG, Education Type HEP;home safety --    Patient Education OT LTG, Education Understanding verbalizes understanding;demonstrates adequately;independent --    Patient Education OT LTG, Date Goal Reviewed --  03/01/17   Patient Education OT LTG Outcome --  goal ongoing   Patient Education OT LTG, Reason Goal Not Met --  unable to make needed progress       Goal: ADL Goal LTG- OT  Outcome: Ongoing (interventions implemented as appropriate)    02/28/17 1500 03/01/17 1310   ADL OT LTG   ADL OT LTG, Date Established 02/28/17 --    ADL OT LTG, Time to Achieve by discharge --    ADL OT LTG, Activity Type ADL skills --    ADL OT LTG, Martin Level min verbal cues;standby assist  (AE as needed) --    ADL OT LTG, Date Goal Reviewed --  03/01/17   ADL OT LTG, Outcome --  goal ongoing   ADL OT LTG, Reason Goal Not Met --  unable to make needed progress       Goal: Activity Tolerance Goal STG- OT  Outcome: Ongoing (interventions implemented as appropriate)    02/28/17 1500 03/01/17 1310   Activity Tolerance OT STG   Activity Tolerance Goal OT STG, Date Established 02/28/17 --    Activity Tolerance Goal OT STG, Time to Achieve 1 wk --    Activity Tolerance Goal OT STG, Activity Level 10 min activity;O2 sat >/equal to 90%;with 2 rest breaks --    Activity Tolerance Goal OT STG, Date Goal Reviewed --  03/01/17   Activity Tolerance Goal OT STG, Outcome --  goal ongoing   Activity Tolerance Goal OT STG, Reason Goal Not Met --  unable to make needed progress       Goal: Endurance Goal LTG- OT  Outcome: Ongoing (interventions implemented as appropriate)    02/28/17 1500 03/01/17 1310   Endurance OT LTG   Endurance Goal OT LTG, Date Established 02/28/17 --    Endurance Goal OT LTG, Time to Achieve by discharge --    Endurance Goal OT LTG, Activity Level endurance 2 good-  (15 minute act) --    Endurance Goal OT  LTG, Date Goal Reviewed --  03/01/17   Endurance Goal OT LTG, Outcome --  goal ongoing   Endurance Goal OT LTG, Reason Goal Not Met --  unable to make needed progress

## 2017-03-01 NOTE — PLAN OF CARE
Problem: Patient Care Overview (Adult)  Goal: Plan of Care Review  Outcome: Ongoing (interventions implemented as appropriate)    03/01/17 0555   Coping/Psychosocial Response Interventions   Plan Of Care Reviewed With patient   Patient Care Overview   Progress improving       Goal: Adult Individualization and Mutuality  Outcome: Ongoing (interventions implemented as appropriate)  Goal: Discharge Needs Assessment  Outcome: Ongoing (interventions implemented as appropriate)    Problem: Cardiac: Heart Failure (Adult)  Goal: Signs and Symptoms of Listed Potential Problems Will be Absent or Manageable (Cardiac: Heart Failure)  Outcome: Ongoing (interventions implemented as appropriate)    Problem: NPPV/CPAP (Adult)  Goal: Signs and Symptoms of Listed Potential Problems Will be Absent or Manageable (NPPV/CPAP)  Outcome: Ongoing (interventions implemented as appropriate)

## 2017-03-01 NOTE — PROGRESS NOTES
Acute Care - Physical Therapy Treatment Note  [facility]     Patient Name: Kevin Henning  : 1940  MRN: [id number]  Today's Date: 3/1/2017  Onset of Illness/Injury or Date of Surgery Date: 17     Referring Physician: Dr. STEW Wells    Admit Date: 2017    Visit Dx:    ICD-10-CM ICD-9-CM   1. Congestive heart failure, unspecified congestive heart failure chronicity, unspecified congestive heart failure type I50.9 428.0   2. Impaired physical mobility Z74. 781.99   3. Impaired mobility and ADLs Z74. 799.89     Patient Active Problem List   Diagnosis   • CKD stage 3 due to type 2 diabetes mellitus   • DM II (diabetes mellitus, type II), controlled   • History of CVA in adulthood   • PVD (peripheral vascular disease)   • Pleural effusion   • History of atrial fibrillation   • Benign essential HTN   • Bradycardia   • Nonrheumatic aortic valve stenosis   • Dyspnea on exertion   • Persistent atrial fibrillation   • Congestive heart failure   • Aortic valve stenosis   • CHF (congestive heart failure)   • Dyslipidemia   • Essential hypertension   • Kidney insufficiency   • History of cerebrovascular accident   • Tobacco dependence syndrome               Adult Rehabilitation Note       17 0927          Rehab Assessment/Intervention    Discipline physical therapy assistant  -TW      Document Type therapy note (daily note)  -TW      Subjective Information agree to therapy;complains of;weakness;fatigue  -TW      Patient Effort, Rehab Treatment fair  -TW      Symptoms Noted During/After Treatment fatigue;shortness of breath  -TW      Precautions/Limitations fall precautions;oxygen therapy device and L/min  -TW      Recorded by [TW] Raoul Devine PTA      Vital Signs    Pretreatment Heart Rate (beats/min) 84  -TW      Intratreatment Heart Rate (beats/min) 86  -TW      Posttreatment Heart Rate (beats/min) 74  -TW      Pre SpO2 (%) 94  -TW      O2 Delivery Pre Treatment supplemental O2  -TW       Intra SpO2 (%) 89  -TW      Post SpO2 (%) 96  -TW      Pre Patient Position Supine  -TW      Intra Patient Position Standing  -TW      Post Patient Position Sitting  -TW      Recorded by [TW] Raoul Devine PTA      Pain Assessment    Pain Assessment No/denies pain  -TW      Recorded by [TW] Raoul Devine PTA      Cognitive Assessment/Intervention    Current Cognitive/Communication Assessment functional  -TW      Orientation Status oriented x 4  -TW      Follows Commands/Answers Questions 100% of the time;able to follow single-step instructions;needs cueing  -TW      Recorded by [TW] Raoul Devine PTA      Bed Mobility, Assessment/Treatment    Bed Mobility, Assistive Device bed rails;head of bed elevated  -TW      Bed Mobility, Roll Left, Laughlin independent  -TW      Bed Mobility, Roll Right, Laughlin independent  -TW      Bed Mobility, Scoot/Bridge, Laughlin supervision required  -TW      Bed Mob, Supine to Sit, Laughlin supervision required  -TW      Bed Mob, Sit to Supine, Laughlin supervision required  -TW      Recorded by [TW] Raoul Devine PTA      Transfer Assessment/Treatment    Transfers, Bed-Chair Laughlin contact guard assist  -TW      Transfers, Chair-Bed Laughlin not tested  -TW      Transfers, Bed-Chair-Bed, Assist Device other (see comments)   HHA  -TW      Recorded by [TW] Raoul Devine PTA      Gait Assessment/Treatment    Gait, Comment --   Pt defferred gait stating he was too SOA.  -TW      Recorded by [TW] Raoul Devine PTA      Therapy Exercises    Bilateral Lower Extremities AROM:;15 reps;supine;ankle pumps/circles;glut sets;quad sets;sitting;hip abduction/adduction;hip flexion;LAQ  -TW      Recorded by [TW] Raoul Devine PTA      Positioning and Restraints    Pre-Treatment Position in bed  -TW      Post Treatment Position chair  -TW      In Bed supine  -TW      In Chair reclined;call light within reach;encouraged to call  for assist  -TW      Recorded by [TW] Raoul Devine PTA        User Key  (r) = Recorded By, (t) = Taken By, (c) = Cosigned By    Initials Name Effective Dates    TW Raoul Devine PTA 10/17/16 -                 IP PT Goals       03/01/17 0927 02/28/17 1439       Transfer Training PT LTG    Transfer Training PT LTG, Date Established  02/28/17  -EZ     Transfer Training PT LTG, Time to Achieve  by discharge  -EZ     Transfer Training PT LTG, Cascade Level  conditional independence  -EZ     Transfer Training PT LTG, Assist Device  other (see comments)   AAD  -EZ     Transfer Training PT  LTG, Date Goal Reviewed 03/01/17  -TW      Transfer Training PT LTG, Outcome goal ongoing  -TW goal ongoing  -EZ     Gait Training PT STG    Gait Training Goal PT STG, Date Established  02/28/17  -EZ     Gait Training Goal PT STG, Time to Achieve  3 days  -EZ     Gait Training Goal PT STG, Cascade Level  supervision required  -EZ     Gait Training Goal PT STG, Assist Device  other (see comments)   AAD  -EZ     Gait Training Goal PT STG, Distance to Achieve  50  -EZ     Gait Training Goal PT STG, Additional Goal  O2 sats will not drop below 92%  -EZ     Gait Training Goal PT STG, Date Goal Reviewed 03/01/17  -TW      Gait Training Goal PT STG, Outcome goal ongoing  -TW goal ongoing  -EZ     Gait Training PT LTG    Gait Training Goal PT LTG, Date Established  02/28/17  -EZ     Gait Training Goal PT LTG, Time to Achieve  by discharge  -EZ     Gait Training Goal PT LTG, Cascade Level  conditional independence  -EZ     Gait Training Goal PT LTG, Assist Device  other (see comments)   AAD  -EZ     Gait Training Goal PT LTG, Distance to Achieve  150  -EZ     Gait Training Goal PT LTG, Date Goal Reviewed 03/01/17  -TW      Gait Training Goal PT LTG, Outcome goal ongoing  -TW goal ongoing  -EZ     Stair Training PT LTG    Stair Training Goal PT LTG, Date Established  02/28/17  -EZ     Stair Training Goal PT LTG, Time to  Achieve  by discharge  -     Stair Training Goal PT LTG, Number of Steps  3  -     Stair Training Goal PT LTG, Wise Level  conditional independence  -     Stair Training Goal PT LTG, Assist Device  2 handrails;cane, quad  -     Stair Training Goal PT LTG, Date Goal Reviewed 03/01/17  -TW      Stair Training Goal PT LTG, Outcome goal ongoing  - goal ongoing  -     Strength Goal PT LTG    Strength Goal PT LTG, Date Established  02/28/17  -     Strength Goal PT LTG, Time to Achieve  by discharge  -     Strength Goal PT LTG, Measure to Achieve  Pt will perform 2 sets of 15 reps or AROM exercises  -     Strength Goal PT LTG, Functional Goal  Pt will progress to standing exercises  -     Strength Goal PT LTG, Date Goal Reviewed 03/01/17  -TW      Strength Goal PT LTG, Outcome goal ongoing  - goal ongoing  -     Patient Education PT LTG    Patient Education PT LTG, Date Established  02/28/17  -     Patient Education PT LTG, Time to Achieve  by discharge  -     Patient Education PT LTG, Education Type  written program;HEP;posture/body mechanics;benefits of activity;home safety  -     Patient Education PT LTG, Date Goal Reviewed 03/01/17  -TW      Patient Education PT LTG Outcome goal ongoing  - goal ongoing  [facility]       User Key  (r) = Recorded By, (t) = Taken By, (c) = Cosigned By    Initials Name Provider Type    EZ Stone, PT Physical Therapist    MUNIR Devine, PTA Physical Therapy Assistant                  PT Recommendation and Plan  Planned Therapy Interventions: balance training, bed mobility training, gait training, home exercise program, patient/family education, ROM (Range of Motion), stair training, strengthening, transfer training  PT Frequency: per priority policy (5-14 times per week)  Plan of Care Review  Plan Of Care Reviewed With: patient  Progress: no change  Outcome Summary/Follow up Plan: No change in goal status at this time.          Outcome  Measures       03/01/17 0927 02/28/17 1500 02/28/17 1121    How much help from another person do you currently need...    Turning from your back to your side while in flat bed without using bedrails? 4  -TW  4  -EZ    Moving from lying on back to sitting on the side of a flat bed without bedrails? 3  -TW  3  -EZ    Moving to and from a bed to a chair (including a wheelchair)? 3  -TW  3  -EZ    Standing up from a chair using your arms (e.g., wheelchair, bedside chair)? 3  -TW  3  -EZ    Climbing 3-5 steps with a railing? 3  -TW  3  -EZ    To walk in hospital room? 3  -TW  3  -EZ    AM-PAC 6 Clicks Score 19  -TW  19  -EZ    How much help from another is currently needed...    Putting on and taking off regular lower body clothing?  2  -BH     Bathing (including washing, rinsing, and drying)  2  -BH     Toileting (which includes using toilet bed pan or urinal)  2  -BH     Putting on and taking off regular upper body clothing  3  -BH     Taking care of personal grooming (such as brushing teeth)  3  -BH     Eating meals  4  -     Score  16  -     Functional Assessment    Outcome Measure Options AM-PAC 6 Clicks Basic Mobility (PT)  - AM-PAC 6 Clicks Daily Activity (OT)  - AM-[facility] 6 Clicks Basic Mobility (PT)  -      User Key  (r) = Recorded By, (t) = Taken By, (c) = Cosigned By    Initials Name Provider Type     Pina Almeida, OTR/L Occupational Therapist    EZ Samantha Stone, PT Physical Therapist     Raoul Devine PTA Physical Therapy Assistant           Time Calculation:         PT Charges       03/01/17 1005          Time Calculation    Start Time 0927  -TW      Stop Time 1002  -TW      Time Calculation (min) 35 min  -TW      PT Received On 03/01/17  -TW      Time Calculation- PT    Total Timed Code Minutes- PT 35 minute(s)  -TW        User Key  (r) = Recorded By, (t) = Taken By, (c) = Cosigned By    Initials Name Provider Type     Raoul Devine PTA Physical Therapy Assistant          Therapy  Charges for Today     Code Description Service Date Service Provider Modifiers Qty    62419389075 HC PT THERAPEUTIC ACT EA 15 MIN 3/1/2017 Raoul Devine PTA GP 1    05219924474 HC PT THER PROC EA 15 MIN 3/1/2017 Raoul Devine PTA GP 1          PT G-Codes  PT Professional Judgement Used?: Yes  Outcome Measure Options: AM-PAC 6 Clicks Basic Mobility (PT)  Score: 19  Functional Limitation: Mobility: Walking and moving around  Mobility: Walking and Moving Around Current Status (): At least 1 percent but less than 20 percent impaired, limited or restricted    Raoul Devine PTA  3/1/2017

## 2017-03-01 NOTE — PLAN OF CARE
Problem: Patient Care Overview (Adult)  Goal: Plan of Care Review  Outcome: Ongoing (interventions implemented as appropriate)    03/01/17 0927   Coping/Psychosocial Response Interventions   Plan Of Care Reviewed With patient   Patient Care Overview   Progress no change   Outcome Evaluation   Outcome Summary/Follow up Plan No change in goal status at this time.       Goal: Discharge Needs Assessment  Outcome: Ongoing (interventions implemented as appropriate)    02/22/17 0951 02/27/17 1723 02/28/17 1500   Discharge Needs Assessment   Concerns To Be Addressed --  --  --    Readmission Within The Last 30 Days --  no previous admission in last 30 days --    Equipment Needed After Discharge --  oxygen --    Discharge Disposition --  home healthcare service --    Discharge Planning Comments Pt may require Home O2. Pt may benefit from  services.  --  --    Current Health   Outpatient/Agency/Support Group Needs --  homecare agency (specify level of care) --    Anticipated Changes Related to Illness --  none --    Self-Care   Equipment Currently Used at Home --  --  cane, quad;cane, straight;grab bar   Living Environment   Transportation Available --  --  car;family or friend will provide     03/01/17 0555   Discharge Needs Assessment   Concerns To Be Addressed no discharge needs identified   Readmission Within The Last 30 Days --    Equipment Needed After Discharge --    Discharge Disposition --    Discharge Planning Comments --    Current Health   Outpatient/Agency/Support Group Needs --    Anticipated Changes Related to Illness --    Self-Care   Equipment Currently Used at Home --    Living Environment   Transportation Available --          Problem: Inpatient Physical Therapy  Goal: Transfer Training Goal 1 LTG- PT  Outcome: Ongoing (interventions implemented as appropriate)    02/28/17 1439 03/01/17 0927   Transfer Training PT LTG   Transfer Training PT LTG, Date Established 02/28/17 --    Transfer Training PT LTG,  Time to Achieve by discharge --    Transfer Training PT LTG, [place] Level conditional independence --    Transfer Training PT LTG, Assist Device other (see comments)  (AAD) --    Transfer Training PT LTG, Date Goal Reviewed --  03/01/17   Transfer Training PT LTG, Outcome --  goal ongoing       Goal: Gait Training Goal STG- PT  Outcome: Ongoing (interventions implemented as appropriate)    02/28/17 1439 03/01/17 0927   Gait Training PT STG   Gait Training Goal PT STG, Date Established 02/28/17 --    Gait Training Goal PT STG, Time to Achieve 3 days --    Gait Training Goal PT STG, [place] Level supervision required --    Gait Training Goal PT STG, Assist Device other (see comments)  (AAD) --    Gait Training Goal PT STG, Distance to Achieve 50 --    Gait Training Goal PT STG, Additional Goal O2 sats will not drop below 92% --    Gait Training Goal PT STG, Date Goal Reviewed --  03/01/17   Gait Training Goal PT STG, Outcome --  goal ongoing       Goal: Gait Training Goal LTG- PT  Outcome: Ongoing (interventions implemented as appropriate)    02/28/17 1439 03/01/17 0927   Gait Training PT LTG   Gait Training Goal PT LTG, Date Established 02/28/17 --    Gait Training Goal PT LTG, Time to Achieve by discharge --    Gait Training Goal PT LTG, [place] Level conditional independence --    Gait Training Goal PT LTG, Assist Device other (see comments)  (AAD) --    Gait Training Goal PT LTG, Distance to Achieve 150 --    Gait Training Goal PT LTG, Date Goal Reviewed --  03/01/17   Gait Training Goal PT LTG, Outcome --  goal ongoing       Goal: Stair Training Goal LTG- PT  Outcome: Ongoing (interventions implemented as appropriate)    02/28/17 1439 03/01/17 0927   Stair Training PT LTG   Stair Training Goal PT LTG, Date Established 02/28/17 --    Stair Training Goal PT LTG, Time to Achieve by discharge --    Stair Training Goal PT LTG, Number of Steps 3 --    Stair Training Goal PT LTG, [place] Level  conditional independence --    Stair Training Goal PT LTG, Assist Device 2 handrails;cane, quad --    Stair Training Goal PT LTG, Date Goal Reviewed --  03/01/17   Stair Training Goal PT LTG, Outcome --  goal ongoing       Goal: Strength Goal LTG- PT  Outcome: Ongoing (interventions implemented as appropriate)    02/28/17 1439 03/01/17 0927   Strength Goal PT LTG   Strength Goal PT LTG, Date Established 02/28/17 --    Strength Goal PT LTG, Time to Achieve by discharge --    Strength Goal PT LTG, Measure to Achieve Pt will perform 2 sets of 15 reps or AROM exercises --    Strength Goal PT LTG, Functional Goal Pt will progress to standing exercises --    Strength Goal PT LTG, Date Goal Reviewed --  03/01/17   Strength Goal PT LTG, Outcome --  goal ongoing       Goal: Patient Education Goal LTG- PT  Outcome: Ongoing (interventions implemented as appropriate)    02/28/17 1439 03/01/17 0927   Patient Education PT LTG   Patient Education PT LTG, Date Established 02/28/17 --    Patient Education PT LTG, Time to Achieve by discharge --    Patient Education PT LTG, Education Type written program;HEP;posture/body mechanics;benefits of activity;home safety --    Patient Education PT LTG, Date Goal Reviewed --  03/01/17   Patient Education PT LTG Outcome --  goal ongoing

## 2017-03-01 NOTE — PROGRESS NOTES
Acute Care - Occupational Therapy Treatment Note  [facility]     Patient Name: Kevin Henning  : 1940  MRN: [id number]  Today's Date: 3/1/2017  Onset of Illness/Injury or Date of Surgery Date: 17  Date of Referral to OT: 17  Referring Physician: Dr. STEW Wells      Admit Date: 2017    Visit Dx:     ICD-10-CM ICD-9-CM   1. Congestive heart failure, unspecified congestive heart failure chronicity, unspecified congestive heart failure type I50.9 428.0   2. Impaired physical mobility Z74.09 781.99   3. Impaired mobility and ADLs Z74.09 799.89     Patient Active Problem List   Diagnosis   • CKD stage 3 due to type 2 diabetes mellitus   • DM II (diabetes mellitus, type II), controlled   • History of CVA in adulthood   • PVD (peripheral vascular disease)   • Pleural effusion   • History of atrial fibrillation   • Benign essential HTN   • Bradycardia   • Nonrheumatic aortic valve stenosis   • Dyspnea on exertion   • Persistent atrial fibrillation   • Congestive heart failure   • Aortic valve stenosis   • CHF (congestive heart failure)   • Dyslipidemia   • Essential hypertension   • Kidney insufficiency   • History of cerebrovascular accident   • Tobacco dependence syndrome             Adult Rehabilitation Note       17 1310 17 0927       Rehab Assessment/Intervention    Discipline occupational therapy assistant  - physical therapy assistant  -     Document Type therapy note (daily note)  - therapy note (daily note)  -     Subjective Information agree to therapy;complains of;weakness;fatigue;pain;dyspnea  - agree to therapy;complains of;weakness;fatigue  -TW     Patient Effort, Rehab Treatment  fair  -TW     Symptoms Noted During/After Treatment fatigue  - fatigue;shortness of breath  -TW     Precautions/Limitations  fall precautions;oxygen therapy device and L/min  -TW     Recorded by [] MAYCOL Banks/MAYELIN [] Raoul Devine PTA     Vital Signs    Pretreatment  Heart Rate (beats/min)  84  -TW     Intratreatment Heart Rate (beats/min)  86  -TW     Posttreatment Heart Rate (beats/min)  74  -TW     Pre SpO2 (%) 96  -JH 94  -TW     O2 Delivery Pre Treatment supplemental O2  - supplemental O2  -TW     Intra SpO2 (%) 94  -JH 89  -TW     O2 Delivery Intra Treatment supplemental O2  -      Post SpO2 (%) 94  -JH 96  -TW     O2 Delivery Post Treatment supplemental O2  -      Pre Patient Position  Supine  -TW     Intra Patient Position  Standing  -TW     Post Patient Position  Sitting  -TW     Recorded by [] MAYCOL Banks/MAYELIN [TW] Raoul Devine PTA     Pain Assessment    Pain Assessment  No/denies pain  -TW     Recorded by  [] Raoul Devine PTA     Cognitive Assessment/Intervention    Current Cognitive/Communication Assessment functional  - functional  -TW     Orientation Status oriented x 4  - oriented x 4  -TW     Follows Commands/Answers Questions 100% of the time  - 100% of the time;able to follow single-step instructions;needs cueing  -TW     Recorded by [] MAYCOL Banks/MAYELIN [TW] Raoul Devine PTA     Bed Mobility, Assessment/Treatment    Bed Mobility, Assistive Device  bed rails;head of bed elevated  -TW     Bed Mobility, Roll Left, [place] independent  - independent  -TW     Bed Mobility, Roll Right, [place] independent  - independent  -TW     Bed Mobility, Scoot/Bridge, [place]  supervision required  -TW     Bed Mob, Supine to Sit, [place]  supervision required  -TW     Bed Mob, Sit to Supine, [place]  supervision required  -TW     Recorded by [] MAYCOL Banks/MAYELIN [TW] Raoul Devine PTA     Transfer Assessment/Treatment    Transfers, Bed-Chair [place]  contact guard assist  -TW     Transfers, Chair-Bed [place]  not tested  -TW     Transfers, Bed-Chair-Bed, Assist Device  other (see comments)   HHA  -TW     Transfer, Comment Pt decline fxnl transfer   -      Recorded by []  OBED Banks [TW] Raoul Devine PTA     Gait Assessment/Treatment    Gait, Comment  --   Pt defferred gait stating he was too SOA.  -TW     Recorded by  [] Raoul Devine PTA     Functional Mobility    Functional Mobility- Comment Ptdecline to get up oob  -      Recorded by [] OBED Banks      Therapy Exercises    Bilateral Lower Extremities  AROM:;15 reps;supine;ankle pumps/circles;glut sets;quad sets;sitting;hip abduction/adduction;hip flexion;LAQ  -TW     Bilateral Upper Extremity AROM:;10 reps;15 reps;supine;sitting;elbow flexion/extension;pronation/supination;shoulder abduction/adduction;shoulder extension/flexion  -      BUE Resistance manual resistance- minimal  -      Recorded by [] OBED Banks [] Raoul Devine PTA     Positioning and Restraints    Pre-Treatment Position in bed  - in bed  -TW     Post Treatment Position bed  - chair  -TW     In Bed supine;call light within reach;encouraged to call for assist  - supine  -TW     In Chair  reclined;call light within reach;encouraged to call for assist  -TW     Recorded by [] OBED Banks [] Raoul Devine PTA       User Key  (r) = Recorded By, (t) = Taken By, (c) = Cosigned By    Initials Name Effective Dates     Raoul Devine PTA 10/17/16 -      OBED Banks 10/17/16 -                 OT Goals       03/01/17 1310 02/28/17 1500       Patient Education OT LTG    Patient Education OT LTG, Date Established  02/28/17  -     Patient Education OT LTG, Time to Achieve  by discharge  -     Patient Education OT LTG, Education Type  HEP;home safety  -     Patient Education OT LTG, Education Understanding  verbalizes understanding;demonstrates adequately;independent  -     Patient Education OT LTG, Date Goal Reviewed 03/01/17  -      Patient Education OT LTG Outcome goal ongoing  -      Patient Education OT LTG, Reason Goal Not Met unable to make needed  progress  -      ADL OT LTG    ADL OT LTG, Date Established  02/28/17  -     ADL OT LTG, Time to Achieve  by discharge  -     ADL OT LTG, Activity Type  ADL skills  -     ADL OT LTG, [place] Level  min verbal cues;standby assist   AE as needed  -     ADL OT LTG, Date Goal Reviewed 03/01/17  -      ADL OT LTG, Outcome goal ongoing  -      ADL OT LTG, Reason Goal Not Met unable to make needed progress  -      Activity Tolerance OT STG    Activity Tolerance Goal OT STG, Date Established  02/28/17  -     Activity Tolerance Goal OT STG, Time to Achieve  1 wk  -     Activity Tolerance Goal OT STG, Activity Level  10 min activity;O2 sat >/equal to 90%;with 2 rest breaks  -     Activity Tolerance Goal OT STG, Date Goal Reviewed 03/01/17  -      Activity Tolerance Goal OT STG, Outcome goal ongoing  -      Activity Tolerance Goal OT STG, Reason Goal Not Met unable to make needed progress  -      Endurance OT LTG    Endurance Goal OT LTG, Date Established  02/28/17  -     Endurance Goal OT LTG, Time to Achieve  by discharge  -     Endurance Goal OT LTG, Activity Level  endurance 2 good-   15 minute act  -     Endurance Goal OT LTG, Date Goal Reviewed 03/01/17  -      Endurance Goal OT LTG, Outcome goal ongoing  -      Endurance Goal OT LTG, Reason Goal Not Met unable to make needed progress  -        User Key  (r) = Recorded By, (t) = Taken By, (c) = Cosigned By    Initials Name Provider Type     Pina Almeida OTR/L Occupational Therapist     MAYCOL Banks/L Occupational Therapy Assistant          Occupational Therapy Education     Title: PT OT SLP Therapies (Active)     Topic: Occupational Therapy (Done)     Point: ADL training (Done)    Description: Instruct learner(s) on proper safety adaptation and remediation techniques during self care or transfers.   Instruct in proper use of assistive devices.    Learning Progress Summary    Learner Readiness Method Response  Comment Documented by Status   Patient Acceptance [facility] 03/01/17 1429 Done               Point: Home exercise program (Done)    Description: Instruct learner(s) on appropriate technique for monitoring, assisting and/or progressing therapeutic exercises/activities.    Learning Progress Summary    Learner Readiness Method Response Comment Documented by Status   Patient Acceptance [facility] 03/01/17 1429 Done               Point: Precautions (Done)    Description: Instruct learner(s) on prescribed precautions during self-care and functional transfers.    Learning Progress Summary    Learner Readiness Method Response Comment Documented by Status   Patient Acceptance [facility] 03/01/17 1429 Done    Acceptance E VU,NR Pt educated to call and not get up on his own. Pt educated on safety with t/f and during eval. Pt educated about OT and POC.  02/28/17 1547 Done               Point: Body mechanics (Done)    Description: Instruct learner(s) on proper positioning and spine alignment during self-care, functional mobility activities and/or exercises.    Learning Progress Summary    Learner Readiness Method Response Comment Documented by Status   Patient Acceptance [facility] 03/01/17 1429 Done    Acceptance E JALEELNR Pt educated to call and not get up on his own. Pt educated on safety with t/f and during eval. Pt educated about OT and POC.  02/28/17 1547 Done                      User Key     Initials Effective Dates Name Provider Type Discipline     10/17/16 -  OTTO Jacob/L Occupational Therapist OT     10/17/16 -  MAYCOL Banks/L Occupational Therapy Assistant OT                  OT Recommendation and Plan  Anticipated Discharge Disposition: skilled nursing facility, inpatient rehabilitation facility (TCU/LTACH/SNF)  Planned Therapy Interventions: activity intolerance, adaptive equipment training, ADL retraining, IADL retraining, balance training, bed mobility training, energy conservation, fine motor  coordination training, home exercise program, motor coordination training, ROM (Range of Motion), strengthening, transfer training  Therapy Frequency:  (3-14x a week)  Plan of Care Review  Outcome Summary/Follow up Plan: Pt endurance very low may benefit from continued OT/PT prior to d/c home         Outcome Measures       03/01/17 1310 03/01/17 0927 02/28/17 1500    How much help from another person do you currently need...    Turning from your back to your side while in flat bed without using bedrails?  4  -TW     Moving from lying on back to sitting on the side of a flat bed without bedrails?  3  -TW     Moving to and from a bed to a chair (including a wheelchair)?  3  -TW     Standing up from a chair using your arms (e.g., wheelchair, bedside chair)?  3  -TW     Climbing 3-5 steps with a railing?  3  -TW     To walk in hospital room?  3  -TW     AM-PAC 6 Clicks Score  19  -TW     How much help from another is currently needed...    Putting on and taking off regular lower body clothing? 2  -  2  -BH    Bathing (including washing, rinsing, and drying) 2  -  2  -BH    Toileting (which includes using toilet bed pan or urinal) 2  -  2  -BH    Putting on and taking off regular upper body clothing 2  -  3  -BH    Taking care of personal grooming (such as brushing teeth) 2  -  3  -BH    Eating meals 3  -  4  -    Score 13  -JH  16  -BH    Functional Assessment    Outcome Measure Options  AM-PAC 6 Clicks Basic Mobility (PT)  -TW AM-PAC 6 Clicks Daily Activity (OT)  -      02/28/17 1121          How much help from another person do you currently need...    Turning from your back to your side while in flat bed without using bedrails? 4  -EZ      Moving from lying on back to sitting on the side of a flat bed without bedrails? 3  -EZ      Moving to and from a bed to a chair (including a wheelchair)? 3  -EZ      Standing up from a chair using your arms (e.g., wheelchair, bedside chair)? 3  -EZ      Climbing  3-5 steps with a railing? 3  -EZ      To walk in hospital room? 3  -EZ      AM-PAC 6 Clicks Score 19  -      Functional Assessment    Outcome Measure Options AM-PAC 6 Clicks Basic Mobility (PT)  -        User Key  (r) = Recorded By, (t) = Taken By, (c) = Cosigned By    Initials Name Provider Type     Pina Almeida, OTR/L Occupational Therapist    EZ Samantha Stone, PT Physical Therapist     Raoul Devine, PTA Physical Therapy Assistant     Montserrat Patel SEVERINO/L Occupational Therapy Assistant           Time Calculation:         Time Calculation- OT       03/01/17 1434          Time Calculation- OT    OT Start Time 1310  -      OT Stop Time 1350  -      OT Time Calculation (min) 40 min  -      Total Timed Code Minutes- OT 40 minute(s)  -      OT Received On 03/01/17  -        User Key  (r) = Recorded By, (t) = Taken By, (c) = Cosigned By    Initials Name Provider Type     LILIAN BanksA/L Occupational Therapy Assistant           Therapy Charges for Today     Code Description Service Date Service Provider Modifiers Qty    79212866990 HC OT SELF CARE/MGMT/TRAIN EA 15 MIN 3/1/2017 LILIAN BanksA/L GO 1    35918262012 HC OT THERAPEUTIC ACT EA 15 MIN 3/1/2017 MAYCOL Banks/L GO 1    [id number] HC OT THER PROC EA 15 MIN 3/1/2017 MAYCOL Banks/L GO 1          OT G-codes  OT Professional Judgement Used?: Yes  OT Functional Scales Options: AM-PAC 6 Clicks Daily Activity (OT)  Score: 16  Functional Limitation: Self care  Self Care Current Status (): At least 40 percent but less than 60 percent impaired, limited or restricted  Self Care Goal Status (): At least 20 percent but less than 40 percent impaired, limited or restricted    OBED Banks  3/1/2017

## 2017-03-02 NOTE — PROGRESS NOTES
[facility] Medicine Services  INPATIENT PROGRESS NOTE     LOS: 9 days   Patient Care Team:  Klaus Faria MD as PCP - General    Chief Complaint:  Complains of having shortness of breath.      Subjective     Interval History:     Patient Complaints: Patient currently seems to be severe respiratory distress.  Patient does complain of having shortness of breath.    History taken from: patient    Review of Systems:    Review of Systems   Constitutional: Negative for appetite change, chills, diaphoresis and fever.   HENT: Negative for congestion, rhinorrhea, sore throat and trouble swallowing.    Eyes: Negative for visual disturbance.   Respiratory: Positive for shortness of breath and wheezing. Negative for cough and chest tightness.    Cardiovascular: Negative for chest pain, palpitations and leg swelling.   Gastrointestinal: Negative for abdominal pain, blood in stool, diarrhea, nausea and vomiting.   Endocrine: Negative for cold intolerance and heat intolerance.   Genitourinary: Negative for decreased urine volume and difficulty urinating.   Musculoskeletal: Negative for back pain, gait problem and neck pain.   Skin: Negative for rash.   Neurological: Positive for tremors and weakness. Negative for dizziness, syncope, light-headedness, numbness and headaches.   Psychiatric/Behavioral: The patient is not nervous/anxious.          Objective     Vital Signs  Temp:  [96.4 °F (35.8 °C)-97.3 °F (36.3 °C)] 96.5 °F (35.8 °C)  Heart Rate:  [63-91] 68  Resp:  [20-24] 20  BP: (148-197)/(54-86) 167/72    Physical Exam:   Physical Exam   Constitutional: He is oriented to person, place, and time. He appears well-developed and well-nourished.   HENT:   Head: Normocephalic and atraumatic.   Nose: Nose normal.   Eyes: Conjunctivae and EOM are normal. Pupils are equal, round, and reactive to light.   Neck: Normal range of motion. Neck supple. No JVD present. No tracheal deviation  present. No thyromegaly present.   Cardiovascular: Normal rate, regular rhythm, normal heart sounds and intact distal pulses.    Pulmonary/Chest: He is in respiratory distress. He has wheezes. He has rales (Crackles and Rales to 2/3rd of the Lung Fields bilateraly.). He exhibits no tenderness.   Abdominal: Soft. Bowel sounds are normal. He exhibits no distension. There is no tenderness. There is no rebound and no guarding.   Musculoskeletal: Normal range of motion. He exhibits no edema.   Lymphadenopathy:     He has no cervical adenopathy.   Neurological: He is alert and oriented to person, place, and time. He has normal reflexes. No cranial nerve deficit.   Skin: Skin is warm and dry.   Intact   Psychiatric: He has a normal mood and affect.   Nursing note and vitals reviewed.         Results Review:         Results from last 7 days  Lab Units 03/02/17 0527 03/01/17 0734 02/28/17  0544 02/27/17  0524 02/26/17  0730 02/25/17  0720 02/24/17  0558   SODIUM mmol/L 134* 136* 134* 138 139 139 135*   POTASSIUM mmol/L 3.5 3.6 3.6 3.7 4.0 3.8 4.1   CHLORIDE mmol/L 99 99 100 100 100 102 100   TOTAL CO2 mmol/L 27.0 26.0 27.0 29.0 29.0 25.0 24.0   BUN mg/dL 88* 85* 79* 79* 71* 68* 58*   CREATININE mg/dL 2.10* 2.06* 2.01* 2.02* 2.09* 2.23* 2.41*   GLUCOSE mg/dL 220* 136* 192* 113* 127* 160* 130*   CALCIUM mg/dL 9.0 9.0 9.0 9.1 9.6 9.3 9.5   BILIRUBIN mg/dL  --   --   --   --   --   --  0.6   ALK PHOS U/L  --   --   --   --   --   --  174*   ALT (SGPT) U/L  --   --   --   --   --   --  70   AST (SGOT) U/L  --   --   --   --   --   --  65*         Results from last 7 days  Lab Units 03/02/17 0527 03/01/17 0734 02/28/17  0544 02/25/17  0720   MAGNESIUM mg/dL 2.7* 2.6* 2.6*  --    PHOSPHORUS mg/dL  --   --   --  4.9*         Results from last 7 days  Lab Units 03/02/17  0527 03/01/17  0634 02/28/17  0544 02/27/17  0524 02/26/17  0730 02/25/17  0720   WBC 10*3/mm3 8.96 9.99* 7.05 7.33 9.15 9.81*   HEMOGLOBIN g/dL 12.4* 12.5*  12.0* 12.1* 12.6* 12.1*   HEMATOCRIT % 36.1* 36.5* 35.2* 36.0* 38.0* 35.9*   PLATELETS 10*3/mm3 201 207 188 192 226 205         Results from last 7 days  Lab Units 03/02/17  0527 03/01/17  1234 02/28/17  0544 02/27/17  0524 02/26/17  0730 02/25/17  0720 02/24/17  0558   INR  4.08* 2.87* 1.50* 1.78* 1.77* 1.96* 2.00*        Imaging Results (last 7 days)     Procedure Component Value Units Date/Time    XR Chest 1 View [[id number]] Collected:  02/21/17 1158     Updated:  02/21/17 1205    Narrative:       [facility] [place]    Patient Name: MR. ELLEN MEDINA    ORDERING: BERTHA ROYAL     ATTENDING:    REFERRING: BERTHA ROYAL    -----------------------    PROCEDURE: Portable chest x-ray    TECHNIQUE: Single AP view of the chest    COMPARISON: 1/30/2017    HISTORY: Chest pain protocol    FINDINGS:     Life-support devices: Left-sided cardiac pacer stable in position    Lungs/pleura: Blunting of the costophrenic angles and prominent  perihilar markings bilaterally. No pneumothorax.    Heart, hilar and mediastinal structures: Cardiac silhouette  slightly enlarged. Sternal suture wires appear stable. Thoracic  aorta contains atherosclerotic calcification. Patient's chin  partially obscures the lung apices.      Impression:       CONCLUSION:  Small to moderate sized bilateral pleural effusions and  superimposed mild pulmonary edema.    Electronically signed by:  Clark Lucero MD  2/21/2017 12:03 PM  CST Workstation: [id number]    XR Chest PA & Lateral [[id number]] Collected:  02/23/17 1045     Updated:  02/23/17 1049    Narrative:       Patient Name:  MR. ELLEN MEDINA  Patient ID:  [id number]   Ordering:  TRINITY NAVAS  Attending:  NOHEMY POZO  Referring:  TRINITY NAVAS  ------------------------------------------------  Chest pain.    Chest, AP and lateral views.    Comparison is made with study dated February 21, 2017.    The cardiac silhouette is within normal limits. There are  small  to moderate bilateral pleural effusions. There are bilateral  interstitial markings. There are median sternotomy wires. There  is pacemaker with two leads.      Impression:       CONCLUSION: Small to moderate bilateral pleural effusions.    Electronically signed by:  Nnamdi Marquis MD  2/23/2017 10:48  AM CST Workstation: [id number]    XR Chest PA & Lateral [[id number]] Collected:  02/27/17 0742     Updated:  02/27/17 0745    Narrative:       Patient Name:  MR. ELLEN MEDINA  Patient ID:  [id number]   Ordering:  CECY ARMENTA  Attending:  NOHEMY POZO  Referring:  CECY ARMENTA  ------------------------------------------------      PROCEDURE: Chest PA and lateral    REASON FOR EXAM: shortness of air, I50.9 Heart failure,  unspecified    FINDINGS: Comparison study dated February 23, 2017.  Postsurgical  changes with median sternotomy. Stable pacemaker and leads. .  Cardiac size appears within normal limits. Bibasilar small patchy  opacities. Lungs otherwise clear. Mild worsening of moderate  right pleural effusion. Small left pleural effusion. No acute  osseous abnormality.      Impression:       1.  Mild worsening of moderate right pleural effusion.  2.  Small left pleural effusion which appears stable.  3.  Bibasilar small patchy opacities suspicious for pulmonary  edema versus pneumonia versus subsegmental atelectasis.    Electronically signed by:  Remy Caballero MD  2/27/2017 7:44 AM CST  Workstation: [id number]                                   Medication Review:   Current Facility-Administered Medications   Medication Dose Route Frequency Provider Last Rate Last Dose   • acetaminophen (TYLENOL) tablet 650 mg  650 mg Oral Q6H PRN Adriana Wells MD   650 mg at 02/28/17 1450   • albuterol (PROVENTIL HFA;VENTOLIN HFA) inhaler 2 puff  2 puff Inhalation Q4H PRN Adriana Wells MD       • albuterol (PROVENTIL) nebulizer solution 0.083% 2.5 mg/3mL  2.5 mg Nebulization Q4H PRN Shaun GARCIA  LONNIE Phan       • atorvastatin (LIPITOR) tablet 20 mg  20 mg Oral Daily Adriana Wells MD   20 mg at 03/02/17 1021   • budesonide (PULMICORT) nebulizer solution 0.5 mg  0.5 mg Nebulization BID - RT Zackary Girish Anand MD   0.5 mg at 03/02/17 0838   • cholecalciferol (VITAMIN D3) tablet 1,000 Units  1,000 Units Oral Daily Adriana Wells MD   1,000 Units at 03/02/17 1021   • clopidogrel (PLAVIX) tablet 75 mg  75 mg Oral Daily Adriana Wells MD   75 mg at 03/02/17 1021   • dextrose (D50W) solution 25 g  25 g Intravenous Q15 Min PRN Adriana Wells MD       • dextrose (D50W) solution 50 mL  50 mL Intravenous Q1H PRN Stephani Bingham MD   50 mL at 02/22/17 0645   • dextrose (GLUTOSE) oral gel 15 g  15 g Oral Q15 Min PRN Adriana Wells MD       • diltiaZEM CD (CARDIZEM CD) 24 hr capsule 240 mg  240 mg Oral Daily Adriana Wells MD   240 mg at 03/02/17 1021   • escitalopram (LEXAPRO) tablet 10 mg  10 mg Oral Daily Adriana Wells MD   10 mg at 03/02/17 1021   • furosemide (LASIX) tablet 40 mg  40 mg Oral BID Richi Hayward MD   40 mg at 03/01/17 1818   • glipiZIDE (GLUCOTROL) 24 hr tablet 2.5 mg  2.5 mg Oral Nightly Adriana Wells MD   2.5 mg at 03/01/17 2203   • glucagon (human recombinant) (GLUCAGEN DIAGNOSTIC) injection 1 mg  1 mg Subcutaneous Q15 Min PRN Adriana Wells MD       • hydrALAZINE (APRESOLINE) tablet 100 mg  100 mg Oral Q8H Richi Hayward MD   100 mg at 03/02/17 1536   • insulin aspart (novoLOG) injection 0-9 Units  0-9 Units Subcutaneous 4x Daily AC & at Bedtime Adriana Wells MD   6 Units at 03/02/17 1244   • insulin detemir (LEVEMIR) injection 15 Units  15 Units Subcutaneous Nightly Zackary Anand MD   15 Units at 03/01/17 2159   • ipratropium (ATROVENT) nebulizer solution 0.5 mg  0.5 mg Nebulization 4x Daily - RT Zackary Anand MD   0.5 mg at 03/02/17 1540   • isosorbide mononitrate (IMDUR) 24 hr tablet 60 mg  60 mg Oral Q24H Zackary Stout  MD Cheng   60 mg at 03/02/17 1021   • methylPREDNISolone sodium succinate (SOLU-Medrol) injection 40 mg  40 mg Intravenous Q12H Adriana Wells MD   40 mg at 03/02/17 1536   • pantoprazole (PROTONIX) EC tablet 40 mg  40 mg Oral Q AM Fabiana Wells MD   40 mg at 03/02/17 0644   • Pharmacy to dose warfarin   Does not apply Continuous PRN Zackary Girish Anand MD       • phenol (CHLORASEPTIC) 1.4 % liquid 2 spray  2 spray Mouth/Throat Q2H PRN Fabiana Wells MD       • sodium chloride 0.9 % flush 10 mL  10 mL Intravenous PRN David Henderson MD   10 mL at 03/01/17 1448         Assessment/Plan     Principal Problem:    CHF (congestive heart failure)  Active Problems:    Nonrheumatic aortic valve stenosis    Persistent atrial fibrillation    Congestive heart failure    Aortic valve stenosis    Dyslipidemia    Essential hypertension    Kidney insufficiency    History of cerebrovascular accident    Tobacco dependence syndrome          -We'll consider repeating x-ray in the morning.  -Patient was offered BiPAP and possible invasive ventilator support for his hypoxia and respiratory distress, however patient refused either treatment at this point.  -We will give patient IV diuretics and monitor output.  -We'll follow nephrology and cardiology recommendation for further treatment course.  -DVT and GI prophylaxis in place.      Fabiana Wells MD  03/02/17  4:54 PM    EMR Dragon/Transcription disclaimer:   Much of this encounter note is an electronic transcription/translation of spoken language to printed text. The electronic translation of spoken language may permit erroneous, or at times, nonsensical words or phrases to be inadvertently transcribed; Although I have reviewed the note for such errors, some may still exist.

## 2017-03-02 NOTE — PROGRESS NOTES
"   LOS: 9 days    Patient Care Team:  Klaus Faria MD as PCP - General    Subjective:  The patient states he is soa this morning. He is getting iv lasix today.     Review of Systems:   Review of Systems   All other systems reviewed and are negative.      Objective    Lungs few crackles are heard at the bases.  Heart regular rate and rhythm with systolic murmur at the left sternal border.  Abdomen soft and tender distended no hepatojugular reflux.   Extremities 2+ pitting edema bilaterally    Vital Signs  Temp:  [96.1 °F (35.6 °C)-97.3 °F (36.3 °C)] 96.4 °F (35.8 °C)  Heart Rate:  [63-91] 63  Resp:  [18-24] 20  BP: (148-197)/(54-86) 150/54    Flowsheet Rows         First Filed Value    Admission Height  66\" (167.6 cm) Documented at 02/21/2017 1119    Admission Weight  160 lb (72.6 kg) Documented at 02/21/2017 1119          I/O this shift:  In: -   Out: 275 [Urine:275]  I/O last 3 completed shifts:  In: 760 [P.O.:760]  Out: 300 [Urine:300]    Intake/Output Summary (Last 24 hours) at 03/02/17 1031  Last data filed at 03/02/17 1014   Gross per 24 hour   Intake    640 ml   Output    575 ml   Net     65 ml       Physical Exam:  Physical Exam   Constitutional: He appears well-developed.   Eyes: Pupils are equal, round, and reactive to light.   Cardiovascular: Normal rate and regular rhythm.    Pulmonary/Chest: Effort normal. He has rales.   Abdominal: Soft. Bowel sounds are normal.        Results Review:      Results from last 7 days  Lab Units 03/02/17  0527 03/01/17  0734 02/28/17  0544  02/24/17  0558   SODIUM mmol/L 134* 136* 134*  < > 135*   POTASSIUM mmol/L 3.5 3.6 3.6  < > 4.1   CHLORIDE mmol/L 99 99 100  < > 100   TOTAL CO2 mmol/L 27.0 26.0 27.0  < > 24.0   BUN mg/dL 88* 85* 79*  < > 58*   CREATININE mg/dL 2.10* 2.06* 2.01*  < > 2.41*   CALCIUM mg/dL 9.0 9.0 9.0  < > 9.5   BILIRUBIN mg/dL  --   --   --   --  0.6   ALK PHOS U/L  --   --   --   --  174*   ALT (SGPT) U/L  --   --   --   --  70   AST (SGOT) U/L  -- "   --   --   --  65*   GLUCOSE mg/dL 220* 136* 192*  < > 130*   < > = values in this interval not displayed.    Estimated Creatinine Clearance: 29 mL/min (by C-G formula based on Cr of 2.1).      Results from last 7 days  Lab Units 03/02/17  0527 03/01/17  0734 02/28/17  0544 02/25/17  0720   MAGNESIUM mg/dL 2.7* 2.6* 2.6*  --    PHOSPHORUS mg/dL  --   --   --  4.9*               Results from last 7 days  Lab Units 03/02/17  0527 03/01/17  0634 02/28/17  0544 02/27/17  0524 02/26/17  0730   WBC 10*3/mm3 8.96 9.99* 7.05 7.33 9.15   HEMOGLOBIN g/dL 12.4* 12.5* 12.0* 12.1* 12.6*   PLATELETS 10*3/mm3 201 207 188 192 226         Results from last 7 days  Lab Units 03/02/17  0527 03/01/17  1234 02/28/17  0544 02/27/17  0524 02/26/17  0730   INR  4.08* 2.87* 1.50* 1.78* 1.77*         Imaging Results (last 24 hours)     ** No results found for the last 24 hours. **            Medication Review:   Current Facility-Administered Medications   Medication Dose Route Frequency Provider Last Rate Last Dose   • acetaminophen (TYLENOL) tablet 650 mg  650 mg Oral Q6H PRN Adriana Wells MD   650 mg at 02/28/17 1450   • albuterol (PROVENTIL HFA;VENTOLIN HFA) inhaler 2 puff  2 puff Inhalation Q4H PRN Adriana Wells MD       • albuterol (PROVENTIL) nebulizer solution 0.083% 2.5 mg/3mL  2.5 mg Nebulization Q4H PRN LONNIE Leon       • atorvastatin (LIPITOR) tablet 20 mg  20 mg Oral Daily Adriana Wells MD   20 mg at 03/02/17 1021   • budesonide (PULMICORT) nebulizer solution 0.5 mg  0.5 mg Nebulization BID - RT Zackary Anand MD   0.5 mg at 03/02/17 0838   • cholecalciferol (VITAMIN D3) tablet 1,000 Units  1,000 Units Oral Daily Adriana Wells MD   1,000 Units at 03/02/17 1021   • clopidogrel (PLAVIX) tablet 75 mg  75 mg Oral Daily Adriana Wells MD   75 mg at 03/02/17 1021   • dextrose (D50W) solution 25 g  25 g Intravenous Q15 Min PRN Adriana Wells MD       • dextrose (D50W) solution  50 mL  50 mL Intravenous Q1H PRN Stephani Bingham MD   50 mL at 02/22/17 0645   • dextrose (GLUTOSE) oral gel 15 g  15 g Oral Q15 Min PRN Adriana Wells MD       • diltiaZEM CD (CARDIZEM CD) 24 hr capsule 240 mg  240 mg Oral Daily Adriana Wells MD   240 mg at 03/02/17 1021   • escitalopram (LEXAPRO) tablet 10 mg  10 mg Oral Daily Adriana Wells MD   10 mg at 03/02/17 1021   • furosemide (LASIX) tablet 40 mg  40 mg Oral BID Richi Hayward MD   40 mg at 03/01/17 1818   • glipiZIDE (GLUCOTROL) 24 hr tablet 2.5 mg  2.5 mg Oral Nightly Adriana Wells MD   2.5 mg at 03/01/17 2203   • glucagon (human recombinant) (GLUCAGEN DIAGNOSTIC) injection 1 mg  1 mg Subcutaneous Q15 Min PRN Adriana Wells MD       • hydrALAZINE (APRESOLINE) tablet 100 mg  100 mg Oral Q8H Richi Hayward MD   100 mg at 03/02/17 0639   • insulin aspart (novoLOG) injection 0-9 Units  0-9 Units Subcutaneous 4x Daily AC & at Bedtime Adriana Wells MD   9 Units at 03/01/17 2203   • insulin detemir (LEVEMIR) injection 15 Units  15 Units Subcutaneous Nightly Zackary Anand MD   15 Units at 03/01/17 2159   • ipratropium (ATROVENT) nebulizer solution 0.5 mg  0.5 mg Nebulization 4x Daily - RT Zackary Anand MD   0.5 mg at 03/02/17 0839   • isosorbide mononitrate (IMDUR) 24 hr tablet 60 mg  60 mg Oral Q24H Zackary Anand MD   60 mg at 03/02/17 1021   • methylPREDNISolone sodium succinate (SOLU-Medrol) injection 40 mg  40 mg Intravenous Q12H Adriana Wells MD   40 mg at 03/02/17 0340   • pantoprazole (PROTONIX) EC tablet 40 mg  40 mg Oral Q AM Harshul Az Wells MD   40 mg at 03/02/17 0644   • Pharmacy to dose warfarin   Does not apply Continuous PRN Zackary Anand MD       • phenol (CHLORASEPTIC) 1.4 % liquid 2 spray  2 spray Mouth/Throat Q2H PRN Fabiana Wells MD       • sodium chloride 0.9 % flush 10 mL  10 mL Intravenous PRN David Henderson MD   10 mL at 03/01/17 5587       Assessment/Plan     1 chronic kidney disease stage III with renal function at baseline. Has high bun than cr sec to steroid +/- lasix  2 congestive heart failure with significant volume overload. 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 Continue with fluid and salt restriction.  3. ct hydralazine to 100mg tid    Principal Problem:    CHF (congestive heart failure)  Active Problems:    Nonrheumatic aortic valve stenosis    Persistent atrial fibrillation    Congestive heart failure    Aortic valve stenosis    Dyslipidemia    Essential hypertension    Kidney insufficiency    History of cerebrovascular accident    Tobacco dependence syndrome              Richi Hayward MD  03/02/17  10:31 AM

## 2017-03-02 NOTE — PROGRESS NOTES
[facility] Medicine Services  INPATIENT PROGRESS NOTE     LOS: 9 days   Patient Care Team:  Klaus Faria MD as PCP - General    Chief Complaint:  CHF (congestive heart failure)      Subjective     Interval History:     Patient Complaints: Patient seems to be fairly well.  Denies any complaint at this point.    History taken from: patient    Review of Systems:    Review of Systems   Constitutional: Negative for appetite change, chills, diaphoresis and fever.   HENT: Negative for congestion, rhinorrhea, sore throat and trouble swallowing.    Eyes: Negative for visual disturbance.   Respiratory: Positive for shortness of breath and wheezing. Negative for cough and chest tightness.    Cardiovascular: Negative for chest pain, palpitations and leg swelling.   Gastrointestinal: Negative for abdominal pain, blood in stool, diarrhea, nausea and vomiting.   Endocrine: Negative for cold intolerance and heat intolerance.   Genitourinary: Negative for decreased urine volume and difficulty urinating.   Musculoskeletal: Negative for back pain, gait problem and neck pain.   Skin: Negative for rash.   Neurological: Positive for tremors and weakness. Negative for dizziness, syncope, light-headedness, numbness and headaches.   Psychiatric/Behavioral: The patient is not nervous/anxious.          Objective     Vital Signs  T: 96.5 F  HR: 98  RR: 24  BP: 164/68  SpaO2: 93 % on 2 LPM NC      Physical Exam:   Physical Exam   Constitutional: He is oriented to person, place, and time. He appears well-developed and well-nourished.   HENT:   Head: Normocephalic and atraumatic.   Nose: Nose normal.   Eyes: Conjunctivae and EOM are normal. Pupils are equal, round, and reactive to light.   Neck: Normal range of motion. Neck supple. No JVD present. No tracheal deviation present. No thyromegaly present.   Cardiovascular: Normal rate, regular rhythm, normal heart sounds and intact distal pulses.     Pulmonary/Chest: Effort normal and breath sounds normal. No respiratory distress. He has no wheezes. He has no rales. He exhibits no tenderness.   Abdominal: Soft. Bowel sounds are normal. He exhibits no distension. There is no tenderness. There is no rebound and no guarding.   Musculoskeletal: Normal range of motion. He exhibits no edema.   Lymphadenopathy:     He has no cervical adenopathy.   Neurological: He is alert and oriented to person, place, and time. He has normal reflexes. No cranial nerve deficit.   Skin: Skin is warm and dry.   Intact   Psychiatric: He has a normal mood and affect.   Nursing note and vitals reviewed.         Results Review:      Labs have been reviewed.     Imaging Results (last 7 days)     Procedure Component Value Units Date/Time    XR Chest PA & Lateral [[id number]] Collected:  02/27/17 0742     Updated:  02/27/17 0745    Narrative:       Patient Name:  MR. ELLEN MEDINA  Patient ID:  [id number]   Ordering:  CECY ARMENTA  Attending:  NOHEMY POZO  Referring:  CECY ARMENTA  ------------------------------------------------      PROCEDURE: Chest PA and lateral    REASON FOR EXAM: shortness of air, I50.9 Heart failure,  unspecified    FINDINGS: Comparison study dated February 23, 2017.  Postsurgical  changes with median sternotomy. Stable pacemaker and leads. .  Cardiac size appears within normal limits. Bibasilar small patchy  opacities. Lungs otherwise clear. Mild worsening of moderate  right pleural effusion. Small left pleural effusion. No acute  osseous abnormality.      Impression:       1.  Mild worsening of moderate right pleural effusion.  2.  Small left pleural effusion which appears stable.  3.  Bibasilar small patchy opacities suspicious for pulmonary  edema versus pneumonia versus subsegmental atelectasis.    Electronically signed by:  Remy Caballero MD  2/27/2017 7:44 AM CST  Workstation: [id number]    XR Chest 2 View [[id number]] Collected:  02/28/17 1524     Updated:   02/28/17 1528    Narrative:       Patient Name:  MR. ELLEN MEDINA  Patient ID:  [id number]   Ordering:  LAURA NAVAS  Attending:  LAURA NAVAS  Referring:  LAURA NAVAS  ------------------------------------------------      PROCEDURE: Chest PA and lateral    REASON FOR EXAM: For evaluation and check for layering for  possible thoracentesis., I50.9 Heart failure, unspecified    FINDINGS: Comparison study dated February 27, 2017.  Postsurgical  changes with median sternotomy. Stable pacemaker and leads. .  Cardiac and pulmonary vasculature are normal . Right lung base  small interstitial opacity. Lungs otherwise clear. Small to  moderate right pleural effusions right worse than left.. No acute  osseous abnormality.      Impression:       1.  Right lung base small interstitial opacity suspicious for  mild atypical pulmonary edema and/or pneumonia.  2.  Bilateral small to moderate pleural effusions right worse  than left.    Electronically signed by:  Remy Caballero MD  2/28/2017 3:27 PM CST  Workstation: [id number]    XR Chest Lateral Decubitus Left [[id number]] Collected:  02/28/17 1527     Updated:  02/28/17 1529    Narrative:       Patient Name:  MR. ELLEN MEDINA  Patient ID:  [id number]   Ordering:  LAURA NAVAS  Attending:  LAURA NAVAS  Referring:  LAURA NAVAS  ------------------------------------------------  Procedure: Left lateral decubitus chest    Reason for exam: Left pleural effusion    FINDINGS: Somewhat suboptimal exam due to suboptimal positioning.  Small to moderate left pleural effusion.      Impression:       Small to moderate left pleural effusion.    Electronically signed by:  Remy Caballero MD  2/28/2017 3:28 PM CST  Workstation: [id number]    XR Chest Lateral Decubitus Right [[id number]] Collected:  02/28/17 1528     Updated:  02/28/17 1530    Narrative:       Patient Name:  MR. ELLEN MEDINA  Patient ID:  [id number]   Ordering:  LAURA NAVAS  Attending:   LAURA WELLS  Referring:  LAURA WELLS  ------------------------------------------------  Procedure: Decubitus right chest    Reason for exam: Heart failure, pleural effusion.    FINDINGS: Moderate right pleural effusion is seen layering  dependently.      Impression:       Moderate right pleural effusion layering dependently.    Electronically signed by:  Remy Caballero MD  2/28/2017 3:29 PM [facility]  Workstation: [id number]                                   Medication Review:   Current Facility-Administered Medications   Medication Dose Route Frequency Provider Last Rate Last Dose   • acetaminophen (TYLENOL) tablet 650 mg  650 mg Oral Q6H PRN Adriana Wells MD   650 mg at 02/28/17 1450   • albuterol (PROVENTIL HFA;VENTOLIN HFA) inhaler 2 puff  2 puff Inhalation Q4H PRN Adriana Wells MD       • albuterol (PROVENTIL) nebulizer solution 0.083% 2.5 mg/3mL  2.5 mg Nebulization Q4H PRN LONNIE Leon       • atorvastatin (LIPITOR) tablet 20 mg  20 mg Oral Daily Adriana Wells MD   20 mg at 03/02/17 1021   • budesonide (PULMICORT) nebulizer solution 0.5 mg  0.5 mg Nebulization BID - RT Zackary Anand MD   0.5 mg at 03/02/17 0838   • cholecalciferol (VITAMIN D3) tablet 1,000 Units  1,000 Units Oral Daily Adriana Wells MD   1,000 Units at 03/02/17 1021   • clopidogrel (PLAVIX) tablet 75 mg  75 mg Oral Daily Adriana Wells MD   75 mg at 03/02/17 1021   • dextrose (D50W) solution 25 g  25 g Intravenous Q15 Min PRN Adriana Wells MD       • dextrose (D50W) solution 50 mL  50 mL Intravenous Q1H PRN Stephani Bingham MD   50 mL at 02/22/17 0645   • dextrose (GLUTOSE) oral gel 15 g  15 g Oral Q15 Min PRN Adriana Wells MD       • diltiaZEM CD (CARDIZEM CD) 24 hr capsule 240 mg  240 mg Oral Daily Adriana Wells MD   240 mg at 03/02/17 1021   • escitalopram (LEXAPRO) tablet 10 mg  10 mg Oral Daily Adriana Wells MD   10 mg at 03/02/17 1021   • furosemide  (LASIX) tablet 40 mg  40 mg Oral BID Richi Hayward MD   40 mg at 03/01/17 1818   • glipiZIDE (GLUCOTROL) 24 hr tablet 2.5 mg  2.5 mg Oral Nightly Adriana Wells MD   2.5 mg at 03/01/17 2203   • glucagon (human recombinant) (GLUCAGEN DIAGNOSTIC) injection 1 mg  1 mg Subcutaneous Q15 Min PRN Adriana Wells MD       • hydrALAZINE (APRESOLINE) tablet 100 mg  100 mg Oral Q8H Richi Hayward MD   100 mg at 03/02/17 1536   • insulin aspart (novoLOG) injection 0-9 Units  0-9 Units Subcutaneous 4x Daily AC & at Bedtime Adriana Wells MD   6 Units at 03/02/17 1244   • insulin detemir (LEVEMIR) injection 15 Units  15 Units Subcutaneous Nightly Zackary Anand MD   15 Units at 03/01/17 2159   • ipratropium (ATROVENT) nebulizer solution 0.5 mg  0.5 mg Nebulization 4x Daily - RT Zackary Anand MD   0.5 mg at 03/02/17 1540   • isosorbide mononitrate (IMDUR) 24 hr tablet 60 mg  60 mg Oral Q24H Zackary Anand MD   60 mg at 03/02/17 1021   • methylPREDNISolone sodium succinate (SOLU-Medrol) injection 40 mg  40 mg Intravenous Q12H Adriana Wells MD   40 mg at 03/02/17 1536   • pantoprazole (PROTONIX) EC tablet 40 mg  40 mg Oral Q AM Fabiana Wells MD   40 mg at 03/02/17 0644   • Pharmacy to dose warfarin   Does not apply Continuous PRN Zackary Anand MD       • phenol (CHLORASEPTIC) 1.4 % liquid 2 spray  2 spray Mouth/Throat Q2H PRN Fabiana Wells MD       • sodium chloride 0.9 % flush 10 mL  10 mL Intravenous PRN David Henderson MD   10 mL at 03/01/17 1448         Assessment/Plan     Principal Problem:    CHF (congestive heart failure)  Active Problems:    Nonrheumatic aortic valve stenosis    Persistent atrial fibrillation    Congestive heart failure    Aortic valve stenosis    Dyslipidemia    Essential hypertension    Kidney insufficiency    History of cerebrovascular accident    Tobacco dependence syndrome          -We'll continue with IV diuretics.  -We will continue with  nasal cannula as often supplementation.  -We'll follow cardiology recommendation and nephrology recommendation.  -DVT and GI prophylaxis in place.      Fabiana Wells MD  03/01/2017      EMR Dragon/Transcription disclaimer:   Much of this encounter note is an electronic transcription/translation of spoken language to printed text. The electronic translation of spoken language may permit erroneous, or at times, nonsensical words or phrases to be inadvertently transcribed; Although I have reviewed the note for such errors, some may still exist.

## 2017-03-02 NOTE — PROGRESS NOTES
"Anticoagulation by Pharmacy - Warfarin    Kevin Henning is a 76 y.o.male  [Ht: 65.98\" (167.6 cm); Wt: 172 lb 3.2 oz (78.1 kg)] on Warfarin for indication of atrial fibrillation.    Goal INR: 2-3  Today's INR:   Lab Results   Component Value Date    INR 4.08 (H) 03/02/2017         Lab Results   Component Value Date    INR 4.08 (H) 03/02/2017    INR 2.87 (H) 03/01/2017    INR 1.50 (H) 02/28/2017    PROTIME 38.0 (H) 03/02/2017    PROTIME 29.2 (H) 03/01/2017    PROTIME  02/28/2017      Comment:      Fingerstick prothrombin time, no seconds available     Lab Results   Component Value Date    HGB 12.4 (L) 03/02/2017    HGB 12.5 (L) 03/01/2017    HGB 12.0 (L) 02/28/2017     Lab Results   Component Value Date    HCT 36.1 (L) 03/02/2017    HCT 36.5 (L) 03/01/2017    HCT 35.2 (L) 02/28/2017     Assessment/Plan:  INR up significantly from yesterday. Will continue to hold warfarin and reassess further dosing following return of further INR values.     Jostin Gallegos, [facility]  03/02/17 9:40 AM     "

## 2017-03-02 NOTE — PLAN OF CARE
Problem: Patient Care Overview (Adult)  Goal: Plan of Care Review  Outcome: Ongoing (interventions implemented as appropriate)    03/02/17 0600   Coping/Psychosocial Response Interventions   Plan Of Care Reviewed With patient   Patient Care Overview   Progress improving       Goal: Adult Individualization and Mutuality  Outcome: Ongoing (interventions implemented as appropriate)  Goal: Discharge Needs Assessment  Outcome: Ongoing (interventions implemented as appropriate)    Problem: Cardiac: Heart Failure (Adult)  Goal: Signs and Symptoms of Listed Potential Problems Will be Absent or Manageable (Cardiac: Heart Failure)  Outcome: Ongoing (interventions implemented as appropriate)

## 2017-03-02 NOTE — PLAN OF CARE
Problem: Patient Care Overview (Adult)  Goal: Plan of Care Review  Outcome: Ongoing (interventions implemented as appropriate)    03/02/17 1123   Coping/Psychosocial Response Interventions   Plan Of Care Reviewed With patient   Patient Care Overview   Progress progress toward functional goals is gradual   Outcome Evaluation   Outcome Summary/Follow up Plan Pt cont to be extremely SOA with activity, pt appears limited solely by SOA and increased RR. TCU or SNF are still recommeded as pt is unsafe to return home independently at present time.          Problem: Inpatient Physical Therapy  Goal: Transfer Training Goal 1 LTG- PT  Outcome: Ongoing (interventions implemented as appropriate)    02/28/17 1439 03/01/17 0927 03/02/17 1123   Transfer Training PT LTG   Transfer Training PT LTG, Date Established 02/28/17 --  --    Transfer Training PT LTG, Time to Achieve by discharge --  --    Transfer Training PT LTG, [place] Level conditional independence --  --    Transfer Training PT LTG, Assist Device other (see comments)  (AAD) --  --    Transfer Training PT LTG, Date Goal Reviewed --  --  03/02/17   Transfer Training PT LTG, Outcome --  goal ongoing --        Goal: Gait Training Goal STG- PT  Outcome: Ongoing (interventions implemented as appropriate)    02/28/17 1439 03/01/17 0927 03/02/17 1123   Gait Training PT STG   Gait Training Goal PT STG, Date Established 02/28/17 --  --    Gait Training Goal PT STG, Time to Achieve 3 days --  --    Gait Training Goal PT STG, [place] Level supervision required --  --    Gait Training Goal PT STG, Assist Device other (see comments)  (AAD) --  --    Gait Training Goal PT STG, Distance to Achieve 50 --  --    Gait Training Goal PT STG, Additional Goal O2 sats will not drop below 92% --  --    Gait Training Goal PT STG, Date Goal Reviewed --  --  03/02/17   Gait Training Goal PT STG, Outcome --  goal ongoing --        Goal: Gait Training Goal LTG- PT  Outcome: Ongoing  (interventions implemented as appropriate)    02/28/17 1439 03/01/17 0927 03/02/17 1123   Gait Training PT LTG   Gait Training Goal PT LTG, Date Established 02/28/17 --  --    Gait Training Goal PT LTG, Time to Achieve by discharge --  --    Gait Training Goal PT LTG, [place] Level conditional independence --  --    Gait Training Goal PT LTG, Assist Device other (see comments)  (AAD) --  --    Gait Training Goal PT LTG, Distance to Achieve 150 --  --    Gait Training Goal PT LTG, Date Goal Reviewed --  --  03/02/17   Gait Training Goal PT LTG, Outcome --  goal ongoing --        Goal: Stair Training Goal LTG- PT  Outcome: Ongoing (interventions implemented as appropriate)    02/28/17 1439 03/01/17 0927 03/02/17 1123   Stair Training PT LTG   Stair Training Goal PT LTG, Date Established 02/28/17 --  --    Stair Training Goal PT LTG, Time to Achieve by discharge --  --    Stair Training Goal PT LTG, Number of Steps 3 --  --    Stair Training Goal PT LTG, [place] Level conditional independence --  --    Stair Training Goal PT LTG, Assist Device 2 handrails;cane, quad --  --    Stair Training Goal PT LTG, Date Goal Reviewed --  --  03/02/17   Stair Training Goal PT LTG, Outcome --  goal ongoing --        Goal: Strength Goal LTG- PT  Outcome: Ongoing (interventions implemented as appropriate)    02/28/17 1439 03/01/17 0927 03/02/17 1123   Strength Goal PT LTG   Strength Goal PT LTG, Date Established 02/28/17 --  --    Strength Goal PT LTG, Time to Achieve by discharge --  --    Strength Goal PT LTG, Measure to Achieve Pt will perform 2 sets of 15 reps or AROM exercises --  --    Strength Goal PT LTG, Functional Goal Pt will progress to standing exercises --  --    Strength Goal PT LTG, Date Goal Reviewed --  --  03/02/17   Strength Goal PT LTG, Outcome --  goal ongoing --        Goal: Patient Education Goal LTG- PT  Outcome: Ongoing (interventions implemented as appropriate)    02/28/17 1439 03/01/17 0927  03/02/17 1123   Patient Education PT LTG   Patient Education PT LTG, Date Established 02/28/17 --  --    Patient Education PT LTG, Time to Achieve by discharge --  --    Patient Education PT LTG, Education Type written program;HEP;posture/body mechanics;benefits of activity;home safety --  --    Patient Education PT LTG, Date Goal Reviewed --  --  03/02/17   Patient Education PT LTG Outcome --  goal ongoing --

## 2017-03-02 NOTE — PLAN OF CARE
Problem: Patient Care Overview (Adult)  Goal: Plan of Care Review  Outcome: Ongoing (interventions implemented as appropriate)    03/02/17 0913   Coping/Psychosocial Response Interventions   Plan Of Care Reviewed With patient   Patient Care Overview   Progress progress towards functional goals is fair   Outcome Evaluation   Outcome Summary/Follow up Plan Pt reports he is feeling okay today tolerating all therapeutic exercises and ADL task to increase overall endurance for functional ADL/IADL's upon D/C. Pt will continue to benefit from skilled OT services to address deficits in functional independence.         Problem: Inpatient Occupational Therapy  Goal: Patient Education Goal LTG- OT  Outcome: Ongoing (interventions implemented as appropriate)    02/28/17 1500 03/01/17 1310 03/02/17 0913   Patient Education OT LTG   Patient Education OT LTG, Date Established 02/28/17 --  --    Patient Education OT LTG, Time to Achieve by discharge --  --    Patient Education OT LTG, Education Type HEP;home safety --  --    Patient Education OT LTG, Education Understanding verbalizes understanding;demonstrates adequately;independent --  --    Patient Education OT LTG, Date Goal Reviewed --  --  03/02/17   Patient Education OT LTG Outcome --  goal ongoing --        Goal: ADL Goal LTG- OT  Outcome: Ongoing (interventions implemented as appropriate)    02/28/17 1500 03/01/17 1310 03/02/17 0913   ADL OT LTG   ADL OT LTG, Date Established 02/28/17 --  --    ADL OT LTG, Time to Achieve by discharge --  --    ADL OT LTG, Activity Type ADL skills --  --    ADL OT LTG, [place] Level min verbal cues;standby assist  (AE as needed) --  --    ADL OT LTG, Date Goal Reviewed --  --  03/02/17   ADL OT LTG, Outcome --  goal ongoing --        Goal: Activity Tolerance Goal STG- OT  Outcome: Ongoing (interventions implemented as appropriate)    02/28/17 1500 03/01/17 1310 03/02/17 0913   Activity Tolerance OT STG   Activity Tolerance Goal OT  STG, Date Established 02/28/17 --  --    Activity Tolerance Goal OT STG, Time to Achieve 1 wk --  --    Activity Tolerance Goal OT STG, Activity Level 10 min activity;O2 sat >/equal to 90%;with 2 rest breaks --  --    Activity Tolerance Goal OT STG, Date Goal Reviewed --  --  03/02/17   Activity Tolerance Goal OT STG, Outcome --  goal ongoing --        Goal: Endurance Goal LTG- OT  Outcome: Ongoing (interventions implemented as appropriate)    02/28/17 1500 03/01/17 1310 03/02/17 0913   Endurance OT LTG   Endurance Goal OT LTG, Date Established 02/28/17 --  --    Endurance Goal OT LTG, Time to Achieve by discharge --  --    Endurance Goal OT LTG, Activity Level endurance 2 good-  (15 minute act) --  --    Endurance Goal OT LTG, Date Goal Reviewed --  --  03/02/17   Endurance Goal OT LTG, Outcome --  goal ongoing --

## 2017-03-02 NOTE — PROGRESS NOTES
Patient is not performing well at all with PT and OT. According to PT and OT the problem is mostly with SOA and HR that is keeping him from being able to perform much therapy with OT/PT. Today I feel that this patient is not a TCU candidate. If he improves with OT and PT we will consider him in the near future but if having to decide today he would not be accepted. I will recheck the patient tomorrow. Discussed with ZULY Pablo, CM. And she informs me that the patient has been very sick. We will recheck tomorrow.

## 2017-03-02 NOTE — PROGRESS NOTES
Acute Care - Physical Therapy Treatment Note  [facility]     Patient Name: Kevin Henning  : 1940  MRN: [id number]  Today's Date: 3/2/2017  Onset of Illness/Injury or Date of Surgery Date: 17     Referring Physician: Dr. STEW Wells    Admit Date: 2017    Visit Dx:    ICD-10-CM ICD-9-CM   1. Congestive heart failure, unspecified congestive heart failure chronicity, unspecified congestive heart failure type I50.9 428.0   2. Impaired physical mobility Z74.09 781.99   3. Impaired mobility and ADLs Z74. 799.89     Patient Active Problem List   Diagnosis   • CKD stage 3 due to type 2 diabetes mellitus   • DM II (diabetes mellitus, type II), controlled   • History of CVA in adulthood   • PVD (peripheral vascular disease)   • Pleural effusion   • History of atrial fibrillation   • Benign essential HTN   • Bradycardia   • Nonrheumatic aortic valve stenosis   • Dyspnea on exertion   • Persistent atrial fibrillation   • Congestive heart failure   • Aortic valve stenosis   • CHF (congestive heart failure)   • Dyslipidemia   • Essential hypertension   • Kidney insufficiency   • History of cerebrovascular accident   • Tobacco dependence syndrome               Adult Rehabilitation Note       17 [id number] 17 1310 17 0927    Rehab Assessment/Intervention    Discipline occupational therapy assistant  -BL occupational therapy assistant  - physical therapy assistant  -    Document Type therapy note (daily note)  -BL therapy note (daily note)  - therapy note (daily note)  -TW    Subjective Information agree to therapy;complains of;weakness   shortness of breath  - agree to therapy;complains of;weakness;fatigue;pain;dyspnea  - agree to therapy;complains of;weakness;fatigue  -TW    Patient Effort, Rehab Treatment   fair  -TW    Symptoms Noted During/After Treatment fatigue;shortness of breath  -BL fatigue  - fatigue;shortness of breath  -TW    Precautions/Limitations fall precautions  -BL   fall precautions;oxygen therapy device and L/min  -TW    Recorded by [BL] OBED Vieira [JH] MAYCOL Banks/MAYELIN [TW] Raoul Devine PTA    Vital Signs    Pre Systolic BP Rehab 148  -BL      Pre Treatment Diastolic BP 62  -BL      Pretreatment Heart Rate (beats/min) 61  -BL  84  -TW    Intratreatment Heart Rate (beats/min)   86  -TW    Posttreatment Heart Rate (beats/min) 69  -BL  74  -TW    Pre SpO2 (%) 96  -BL 96  -JH 94  -TW    O2 Delivery Pre Treatment supplemental O2  -BL supplemental O2  -JH supplemental O2  -TW    Intra SpO2 (%)  94  -JH 89  -TW    O2 Delivery Intra Treatment  supplemental O2  -JH     Post SpO2 (%) 94  -BL 94  -JH 96  -TW    O2 Delivery Post Treatment supplemental O2  -BL supplemental O2  -JH     Pre Patient Position Supine  -BL  Supine  -TW    Intra Patient Position Sitting  -BL  Standing  -TW    Post Patient Position Sitting  -BL  Sitting  -TW    Recorded by [BL] MAYCOL Vieira/MAYELIN [JH] MAYCOL Banks/MAYELIN [TW] Raoul Devine PTA    Pain Assessment    Pain Assessment No/denies pain  -BL  No/denies pain  -TW    Recorded by [BL] MYACOL Vieira/MAYELIN  [TW] Raoul Devine PTA    Cognitive Assessment/Intervention    Current Cognitive/Communication Assessment functional  -BL functional  -JH functional  -TW    Orientation Status oriented x 4  -BL oriented x 4  -JH oriented x 4  -TW    Follows Commands/Answers Questions 100% of the time  -% of the time  - 100% of the time;able to follow single-step instructions;needs cueing  -TW    Recorded by [BL] OBED Vieira [JH] MAYCOL Banks/MAYELIN [TW] Raoul Devine PTA    Bed Mobility, Assessment/Treatment    Bed Mobility, Assistive Device   bed rails;head of bed elevated  -TW    Bed Mobility, Roll Left, [place]  independent  -JH independent  -TW    Bed Mobility, Roll Right, [place]  independent  -JH independent  -TW    Bed Mobility, Scoot/Bridge, [place]   supervision required  -TW     Bed Mob, Supine to Sit, [place] supervision required  -BL  supervision required  -TW    Bed Mob, Sit to Supine, [place] supervision required  -BL  supervision required  -TW    Recorded by [BL] OBED Vieira [JH] OBED Banks [TW] Raoul Devine, PTA    Transfer Assessment/Treatment    Transfers, Bed-Chair [place]   contact guard assist  -TW    Transfers, Chair-Bed [place]   not tested  -TW    Transfers, Bed-Chair-Bed, Assist Device   other (see comments)   HHA  -TW    Transfer, Comment  Pt decline fxnl transfer   -JH     Recorded by  [JH] MAYCOL Banks/MAYELIN [TW] Raoul Devine PTA    Gait Assessment/Treatment    Gait, Comment   --   Pt defferred gait stating he was too SOA.  -TW    Recorded by   [TW] Raoul Devine, PTA    Functional Mobility    Functional Mobility- Comment  Ptdecline to get up oob  -JH     Recorded by  [JH] MAYCOL Banks/L     Toileting Assessment/Training    Toileting Assess/Train, Assistive Device urinal  -BL      Toileting Assess/Train, Position supine;sitting  -BL      Toileting Assess/Train, Indepen Level set up required  -BL      Toileting Assess/Train, Impairments strength decreased  -BL      Toileting Assess/Train, Comment No OOB completed this date secondary to pt's O2 saturation decreasing very easily. Pt has moderate difficulty improving O2 with rest breaks.  -BL      Recorded by [BL] MAYCOL Vieira/L      Grooming Assessment/Training    Grooming Assess/Train, Assistive Device --   Wash mitt  -BL      Grooming Assess/Train, Position sitting  -BL      Grooming Assess/Train, Indepen Level set up required  -BL      Grooming Assess/Train, Impairments strength decreased   decreased O2/endurance  -BL      Grooming Assess/Train, Comment Pt demo's decreased endurance and activity tolerance for grooming task this date requiring multiple rest breaks to fully complete task.  -BL      Recorded by [BL] MAYCOL Vieira/MAYELIN       Therapy Exercises    Bilateral Lower Extremities   AROM:;15 reps;supine;ankle pumps/circles;glut sets;quad sets;sitting;hip abduction/adduction;hip flexion;LAQ  -TW    Bilateral Upper Extremity AROM:;15 reps;sitting;elbow flexion/extension;hand pumps;pronation/supination;shoulder extension/flexion;shoulder horizontal abd/add  -BL AROM:;10 reps;15 reps;supine;sitting;elbow flexion/extension;pronation/supination;shoulder abduction/adduction;shoulder extension/flexion  -     BUE Resistance --   2 pound weight as requested by patient   -BL manual resistance- minimal  -     Recorded by [BL] MAYCOL Vieira/MAYELIN [] MAYCOL Banks/MAYELIN [TW] Raoul Devine PTA    Positioning and Restraints    Pre-Treatment Position in bed  -BL in bed  - in bed  -TW    Post Treatment Position bed  -BL bed  - chair  -TW    In Bed notified nsg;supine;call light within reach;encouraged to call for assist;exit alarm on  -BL supine;call light within reach;encouraged to call for assist  - supine  -TW    In Chair   reclined;call light within reach;encouraged to call for assist  -TW    Recorded by [BL] OBED Vieira [] MAYCOL Banks/MAYELIN [TW] Raoul Devine PTA      User Key  (r) = Recorded By, (t) = Taken By, (c) = Cosigned By    Initials Name Effective Dates     Raoul Devine PTA 10/17/16 -      OBED Banks 10/17/16 -      OBED Vieira 10/17/16 -                 IP PT Goals       03/02/17 1123 03/01/17 0927 02/28/17 1439    Transfer Training PT LTG    Transfer Training PT LTG, Date Established   02/28/17  -    Transfer Training PT LTG, Time to Achieve   by discharge  -    Transfer Training PT LTG, [place] Level   conditional independence  -EZ    Transfer Training PT LTG, Assist Device   other (see comments)   AAD  -EZ    Transfer Training PT  LTG, Date Goal Reviewed 03/02/17  -MP 03/01/17  -TW     Transfer Training PT LTG, Outcome  goal ongoing  - goal ongoing  -     Gait Training PT STG    Gait Training Goal PT STG, Date Established   02/28/17  -    Gait Training Goal PT STG, Time to Achieve   3 days  -    Gait Training Goal PT STG, [place] Level   supervision required  -    Gait Training Goal PT STG, Assist Device   other (see comments)   AAD  -    Gait Training Goal PT STG, Distance to Achieve   50  -EZ    Gait Training Goal PT STG, Additional Goal   O2 sats will not drop below 92%  -    Gait Training Goal PT STG, Date Goal Reviewed 03/02/17  -MP 03/01/17  -TW     Gait Training Goal PT STG, Outcome  goal ongoing  -TW goal ongoing  -EZ    Gait Training PT LTG    Gait Training Goal PT LTG, Date Established   02/28/17  -    Gait Training Goal PT LTG, Time to Achieve   by discharge  -    Gait Training Goal PT LTG, [place] Level   conditional independence  -    Gait Training Goal PT LTG, Assist Device   other (see comments)   AAD  -    Gait Training Goal PT LTG, Distance to Achieve   150  -EZ    Gait Training Goal PT LTG, Date Goal Reviewed 03/02/17  -MP 03/01/17  -TW     Gait Training Goal PT LTG, Outcome  goal ongoing  -TW goal ongoing  -EZ    Stair Training PT LTG    Stair Training Goal PT LTG, Date Established   02/28/17  -    Stair Training Goal PT LTG, Time to Achieve   by discharge  -    Stair Training Goal PT LTG, Number of Steps   3  -    Stair Training Goal PT LTG, [place] Level   conditional independence  -    Stair Training Goal PT LTG, Assist Device   2 handrails;cane, quad  -    Stair Training Goal PT LTG, Date Goal Reviewed 03/02/17  -MP 03/01/17  -TW     Stair Training Goal PT LTG, Outcome  goal ongoing  -TW goal ongoing  -    Strength Goal PT LTG    Strength Goal PT LTG, Date Established   02/28/17  -    Strength Goal PT LTG, Time to Achieve   by discharge  -    Strength Goal PT LTG, Measure to Achieve   Pt will perform 2 sets of 15 reps or AROM exercises  -    Strength Goal PT LTG, Functional Goal   Pt will  progress to standing exercises  -    Strength Goal PT LTG, Date Goal Reviewed 03/02/17  - 03/01/17  -     Strength Goal PT LTG, Outcome  goal ongoing  - goal ongoing  -    Patient Education PT LTG    Patient Education PT LTG, Date Established   02/28/17  -    Patient Education PT LTG, Time to Achieve   by discharge  -    Patient Education PT LTG, Education Type   written program;HEP;posture/body mechanics;benefits of activity;home safety  -    Patient Education PT LTG, Date Goal Reviewed 03/02/17  -MP 03/01/17  -     Patient Education PT LTG Outcome  goal ongoing  - goal ongoing  -      User Key  (r) = Recorded By, (t) = Taken By, (c) = Cosigned By    Initials Name Provider Type    EZ Samantha Stone, PT Physical Therapist     Raoul Devine, PTA Physical Therapy Assistant    ARNOL Robison, PT Physical Therapist          Physical Therapy Education     Title: PT OT SLP Therapies (Active)     Topic: Physical Therapy (Active)     Point: Mobility training (Done)    Learning Progress Summary    Learner Readiness Method Response Comment Documented by Status   Patient Acceptance E [facility] 03/02/17 1128 Done               Point: Precautions (Done)    Learning Progress Summary    Learner Readiness Method Response Comment Documented by Status   Patient Acceptance E [facility] 03/02/17 1128 Done                      User Key     Initials Effective Dates Name Provider Type [facility] 10/17/16 -  Greg Robison, PT Physical Therapist PT                    PT Recommendation and Plan  Planned Therapy Interventions: balance training, bed mobility training, gait training, home exercise program, patient/family education, ROM (Range of Motion), stair training, strengthening, transfer training  PT Frequency: per priority policy (5-14 times per week)  Plan of Care Review  Plan Of Care Reviewed With: patient  Progress: progress toward functional goals is gradual  Outcome Summary/Follow up Plan: Pt cont  to be extremely SOA with activity, pt appears limited solely by SOA and increased RR. TCU or SNF are still recommeded as pt is unsafe to return home independently at present time.           Outcome Measures       03/02/17 1100 03/02/17 0846 03/01/17 1310    How much help from another person do you currently need...    Turning from your back to your side while in flat bed without using bedrails? 4  -MP      Moving from lying on back to sitting on the side of a flat bed without bedrails? 4  -MP      Moving to and from a bed to a chair (including a wheelchair)? 3  -MP      Standing up from a chair using your arms (e.g., wheelchair, bedside chair)? 3  -MP      Climbing 3-5 steps with a railing? 3  -MP      To walk in hospital room? 3  -MP      AM-PAC 6 Clicks Score 20  -MP      How much help from another is currently needed...    Putting on and taking off regular lower body clothing?  2  -BL 2  -JH    Bathing (including washing, rinsing, and drying)  2  -BL 2  -JH    Toileting (which includes using toilet bed pan or urinal)  2  -BL 2  -JH    Putting on and taking off regular upper body clothing  2  -BL 2  -JH    Taking care of personal grooming (such as brushing teeth)  3  -BL 2  -JH    Eating meals  3  -BL 3  -JH    Score  14  -BL 13  -JH    Functional Assessment    Outcome Measure Options AM-PAC 6 Clicks Basic Mobility (PT)  -MP AM-PAC 6 Clicks Daily Activity (OT)  -BL       03/01/17 0927 02/28/17 1500 02/28/17 1121    How much help from another person do you currently need...    Turning from your back to your side while in flat bed without using bedrails? 4  -TW  4  -EZ    Moving from lying on back to sitting on the side of a flat bed without bedrails? 3  -TW  3  -EZ    Moving to and from a bed to a chair (including a wheelchair)? 3  -TW  3  -EZ    Standing up from a chair using your arms (e.g., wheelchair, bedside chair)? 3  -TW  3  -EZ    Climbing 3-5 steps with a railing? 3  -TW  3  -EZ    To walk in hospital  room? 3  -TW  3  -EZ    AM-PAC 6 Clicks Score 19  -TW  19  -EZ    How much help from another is currently needed...    Putting on and taking off regular lower body clothing?  2  -BH     Bathing (including washing, rinsing, and drying)  2  -BH     Toileting (which includes using toilet bed pan or urinal)  2  -BH     Putting on and taking off regular upper body clothing  3  -BH     Taking care of personal grooming (such as brushing teeth)  3  -BH     Eating meals  4  -     Score  16  -     Functional Assessment    Outcome Measure Options AM-PAC 6 Clicks Basic Mobility (PT)  -TW AM-PAC 6 Clicks Daily Activity (OT)  - AM-PAC 6 Clicks Basic Mobility (PT)  -EZ      User Key  (r) = Recorded By, (t) = Taken By, (c) = Cosigned By    Initials Name Provider Type     Pina Almeida, OTR/L Occupational Therapist    EZ Stone, PT Physical Therapist    TW Raoul Devine, PTA Physical Therapy Assistant     Montserrat Patel, SEVERINO/L Occupational Therapy Assistant     Sandra Davison SEVERINO/L Occupational Therapy Assistant     Greg Robison, PT Physical Therapist           Time Calculation:         PT Charges       03/02/17 1128          Time Calculation    Start Time 1047  -MP      Stop Time 1102  -MP      Time Calculation (min) 15 min  -MP      PT Received On 03/02/17  -        User Key  (r) = Recorded By, (t) = Taken By, (c) = Cosigned By    Initials Name Provider Type     Greg Robison, PT Physical Therapist          Therapy Charges for Today     Code Description Service Date Service Provider Modifiers Qty    [id number]  PT THERAPEUTIC ACT EA 15 MIN 3/2/2017 Greg Robison, PT GP 1          PT G-Codes  PT Professional Judgement Used?: Yes  Outcome Measure Options: AM-PAC 6 Clicks Basic Mobility (PT)  Score: 19  Functional Limitation: Mobility: Walking and moving around  Mobility: Walking and Moving Around Current Status (): At least 1 percent but less than 20 percent impaired, limited or  restricted    Greg Robison, PT  3/2/2017

## 2017-03-02 NOTE — PROGRESS NOTES
Acute Care - Occupational Therapy Treatment Note  [facility]     Patient Name: Kevin Henning  : 1940  MRN: [id number]  Today's Date: 3/2/2017  Onset of Illness/Injury or Date of Surgery Date: 17  Date of Referral to OT: 17  Referring Physician: Dr. STEW Wells      Admit Date: 2017    Visit Dx:     ICD-10-CM ICD-9-CM   1. Congestive heart failure, unspecified congestive heart failure chronicity, unspecified congestive heart failure type I50.9 428.0   2. Impaired physical mobility Z74.09 781.99   3. Impaired mobility and ADLs Z74.09 799.89     Patient Active Problem List   Diagnosis   • CKD stage 3 due to type 2 diabetes mellitus   • DM II (diabetes mellitus, type II), controlled   • History of CVA in adulthood   • PVD (peripheral vascular disease)   • Pleural effusion   • History of atrial fibrillation   • Benign essential HTN   • Bradycardia   • Nonrheumatic aortic valve stenosis   • Dyspnea on exertion   • Persistent atrial fibrillation   • Congestive heart failure   • Aortic valve stenosis   • CHF (congestive heart failure)   • Dyslipidemia   • Essential hypertension   • Kidney insufficiency   • History of cerebrovascular accident   • Tobacco dependence syndrome             Adult Rehabilitation Note       17 0846 17 1310 17 0927    Rehab Assessment/Intervention    Discipline occupational therapy assistant  -BL occupational therapy assistant  - physical therapy assistant  -    Document Type therapy note (daily note)  -BL therapy note (daily note)  - therapy note (daily note)  -TW    Subjective Information agree to therapy;complains of;weakness   shortness of breath  - agree to therapy;complains of;weakness;fatigue;pain;dyspnea  - agree to therapy;complains of;weakness;fatigue  -TW    Patient Effort, Rehab Treatment   fair  -TW    Symptoms Noted During/After Treatment fatigue;shortness of breath  - fatigue  - fatigue;shortness of breath  -TW     Precautions/Limitations fall precautions  -BL  fall precautions;oxygen therapy device and L/min  -TW    Recorded by [BL] OBED Vieira [JH] OBED Banks [TW] Raoul Devine PTA    Vital Signs    Pre Systolic BP Rehab 148  -BL      Pre Treatment Diastolic BP 62  -BL      Pretreatment Heart Rate (beats/min) 61  -BL  84  -TW    Intratreatment Heart Rate (beats/min)   86  -TW    Posttreatment Heart Rate (beats/min) 69  -BL  74  -TW    Pre SpO2 (%) 96  -BL 96  -JH 94  -TW    O2 Delivery Pre Treatment supplemental O2  -BL supplemental O2  -JH supplemental O2  -TW    Intra SpO2 (%)  94  -JH 89  -TW    O2 Delivery Intra Treatment  supplemental O2  -JH     Post SpO2 (%) 94  -BL 94  -JH 96  -TW    O2 Delivery Post Treatment supplemental O2  -BL supplemental O2  -JH     Pre Patient Position Supine  -BL  Supine  -TW    Intra Patient Position Sitting  -BL  Standing  -TW    Post Patient Position Sitting  -BL  Sitting  -TW    Recorded by [BL] OBED Vieira [JH] MAYCOL Banks/MAYELIN [TW] Raoul Devine PTA    Pain Assessment    Pain Assessment No/denies pain  -BL  No/denies pain  -TW    Recorded by [BL] OBED Vieira  [TW] Raoul Devine PTA    Cognitive Assessment/Intervention    Current Cognitive/Communication Assessment functional  -BL functional  -JH functional  -TW    Orientation Status oriented x 4  -BL oriented x 4  -JH oriented x 4  -TW    Follows Commands/Answers Questions 100% of the time  -% of the time  - 100% of the time;able to follow single-step instructions;needs cueing  -TW    Recorded by [BL] OBED Vieira [JH] MAYCOL Banks/MAYELIN [TW] Raoul Devine PTA    Bed Mobility, Assessment/Treatment    Bed Mobility, Assistive Device   bed rails;head of bed elevated  -TW    Bed Mobility, Roll Left, [place]  independent  -JH independent  -TW    Bed Mobility, Roll Right, [place]  independent  - independent  -TW    Bed Mobility,  Scoot/Bridge, [place]   supervision required  -TW    Bed Mob, Supine to Sit, [place] supervision required  -BL  supervision required  -TW    Bed Mob, Sit to Supine, [place] supervision required  -BL  supervision required  -TW    Recorded by [BL] MAYCOL Vieira/MAYELIN [JH] MAYCOL Banks/MAYELIN [TW] Raoul Devine, PTA    Transfer Assessment/Treatment    Transfers, Bed-Chair [place]   contact guard assist  -TW    Transfers, Chair-Bed [place]   not tested  -TW    Transfers, Bed-Chair-Bed, Assist Device   other (see comments)   HHA  -TW    Transfer, Comment  Pt decline fxnl transfer   -JH     Recorded by  [JH] MAYCOL Banks/MAYELIN [TW] Raoul Devine PTA    Gait Assessment/Treatment    Gait, Comment   --   Pt defferred gait stating he was too SOA.  -TW    Recorded by   [TW] Raoul Devine, PTA    Functional Mobility    Functional Mobility- Comment  Ptdecline to get up oob  -JH     Recorded by  [JH] LILIAN BanksA/L     Toileting Assessment/Training    Toileting Assess/Train, Assistive Device urinal  -BL      Toileting Assess/Train, Position supine;sitting  -BL      Toileting Assess/Train, Indepen Level set up required  -BL      Toileting Assess/Train, Impairments strength decreased  -BL      Toileting Assess/Train, Comment No OOB completed this date secondary to pt's O2 saturation decreasing very easily. Pt has moderate difficulty improving O2 with rest breaks.  -BL      Recorded by [BL] MAYCOL Vieira/L      Grooming Assessment/Training    Grooming Assess/Train, Assistive Device --   Wash mitt  -BL      Grooming Assess/Train, Position sitting  -BL      Grooming Assess/Train, Indepen Level set up required  -BL      Grooming Assess/Train, Impairments strength decreased   decreased O2/endurance  -BL      Grooming Assess/Train, Comment Pt demo's decreased endurance and activity tolerance for grooming task this date requiring multiple rest breaks to fully complete task.   -BL      Recorded by [BL] OBED Vieira      Therapy Exercises    Bilateral Lower Extremities   AROM:;15 reps;supine;ankle pumps/circles;glut sets;quad sets;sitting;hip abduction/adduction;hip flexion;LAQ  -TW    Bilateral Upper Extremity AROM:;15 reps;sitting;elbow flexion/extension;hand pumps;pronation/supination;shoulder extension/flexion;shoulder horizontal abd/add  -BL AROM:;10 reps;15 reps;supine;sitting;elbow flexion/extension;pronation/supination;shoulder abduction/adduction;shoulder extension/flexion  -     BUE Resistance --   2 pound weight as requested by patient   -BL manual resistance- minimal  -     Recorded by [BL] OBED Vieira [] OBED Banks [TW] Raoul Devine PTA    Positioning and Restraints    Pre-Treatment Position in bed  -BL in bed  - in bed  -TW    Post Treatment Position bed  -BL bed  - chair  -TW    In Bed notified nsg;supine;call light within reach;encouraged to call for assist;exit alarm on  -BL supine;call light within reach;encouraged to call for assist  - supine  -TW    In Chair   reclined;call light within reach;encouraged to call for assist  -TW    Recorded by [BL] OBED Vieira [] OBED Banks [] Raoul Devine PTA      User Key  (r) = Recorded By, (t) = Taken By, (c) = Cosigned By    Initials Name Effective Dates     Raoul Devine PTA 10/17/16 -      OBED Banks 10/17/16 -      OBED Vieria 10/17/16 -                 OT Goals       03/02/17 0913 03/01/17 1310 02/28/17 1500    Patient Education OT LTG    Patient Education OT LTG, Date Established   02/28/17  -    Patient Education OT LTG, Time to Achieve   by discharge  -    Patient Education OT LTG, Education Type   HEP;home safety  -    Patient Education OT LTG, Education Understanding   verbalizes understanding;demonstrates adequately;independent  -    Patient Education OT LTG, Date Goal Reviewed 03/02/17  - 03/01/17   -     Patient Education OT LTG Outcome  goal ongoing  -     Patient Education OT LTG, Reason Goal Not Met  unable to make needed progress  -     ADL OT LTG    ADL OT LTG, Date Established   02/28/17  -    ADL OT LTG, Time to Achieve   by discharge  -    ADL OT LTG, Activity Type   ADL skills  -    ADL OT LTG, [place] Level   min verbal cues;standby assist   AE as needed  -    ADL OT LTG, Date Goal Reviewed 03/02/17  -BL 03/01/17  -     ADL OT LTG, Outcome  goal ongoing  -     ADL OT LTG, Reason Goal Not Met  unable to make needed progress  -     Activity Tolerance OT STG    Activity Tolerance Goal OT STG, Date Established   02/28/17  -    Activity Tolerance Goal OT STG, Time to Achieve   1 wk  -    Activity Tolerance Goal OT STG, Activity Level   10 min activity;O2 sat >/equal to 90%;with 2 rest breaks  -    Activity Tolerance Goal OT STG, Date Goal Reviewed 03/02/17  -BL 03/01/17  -     Activity Tolerance Goal OT STG, Outcome  goal ongoing  -     Activity Tolerance Goal OT STG, Reason Goal Not Met  unable to make needed progress  -     Endurance OT LTG    Endurance Goal OT LTG, Date Established   02/28/17  -    Endurance Goal OT LTG, Time to Achieve   by discharge  -    Endurance Goal OT LTG, Activity Level   endurance 2 good-   15 minute act  -    Endurance Goal OT LTG, Date Goal Reviewed 03/02/17  -BL 03/01/17  -     Endurance Goal OT LTG, Outcome  goal ongoing  -     Endurance Goal OT LTG, Reason Goal Not Met  unable to make needed progress  -       User Key  (r) = Recorded By, (t) = Taken By, (c) = Cosigned By    Initials Name Provider Type     Pina Almeida, OTTO/L Occupational Therapist     MAYCOL Banks/L Occupational Therapy Assistant     MAYCOL Vieira/L Occupational Therapy Assistant          Occupational Therapy Education     Title: PT OT SLP Therapies (Active)     Topic: Occupational Therapy (Done)     Point: ADL training (Done)     Description: Instruct learner(s) on proper safety adaptation and remediation techniques during self care or transfers.   Instruct in proper use of assistive devices.    Learning Progress Summary    Learner Readiness Method Response Comment Documented by Status   Patient Acceptance E [facility] 03/01/17 1429 Done               Point: Home exercise program (Done)    Description: Instruct learner(s) on appropriate technique for monitoring, assisting and/or progressing therapeutic exercises/activities.    Learning Progress Summary    Learner Readiness Method Response Comment Documented by Status   Patient Eager E,D VU PLB and EC/WS techniques educated on today verbalizing understanding.  03/02/17 0912 Done    Acceptance E [facility] 03/01/17 1429 Done               Point: Precautions (Done)    Description: Instruct learner(s) on prescribed precautions during self-care and functional transfers.    Learning Progress Summary    Learner Readiness Method Response Comment Documented by Status   Patient Eager E,D VU PLB and EC/WS techniques educated on today verbalizing understanding.  03/02/17 0912 Done    Acceptance E [facility] 03/01/17 1429 Done    Acceptance E VU,NR Pt educated to call and not get up on his own. Pt educated on safety with t/f and during eval. Pt educated about OT and POC.  02/28/17 1547 Done               Point: Body mechanics (Done)    Description: Instruct learner(s) on proper positioning and spine alignment during self-care, functional mobility activities and/or exercises.    Learning Progress Summary    Learner Readiness Method Response Comment Documented by Status   Patient Eager E,D VU PLB and EC/WS techniques educated on today verbalizing understanding.  03/02/17 0912 Done    Acceptance E [facility] 03/01/17 1429 Done    Acceptance E VU,NR Pt educated to call and not get up on his own. Pt educated on safety with t/f and during eval. Pt educated about OT and POC.  02/28/17 1547 Done                       User Key     Initials Effective Dates Name Provider Type Discipline     10/17/16 -  OTTO Jacob/L Occupational Therapist OT     10/17/16 -  MAYCOL Banks/L Occupational Therapy Assistant OT    BL 10/17/16 -  MAYCOL Vieira/MAYELIN Occupational Therapy Assistant OT                  OT Recommendation and Plan  Anticipated Discharge Disposition: skilled nursing facility, inpatient rehabilitation facility (TCU/LTACH/SNF)  Planned Therapy Interventions: activity intolerance, adaptive equipment training, ADL retraining, IADL retraining, balance training, bed mobility training, energy conservation, fine motor coordination training, home exercise program, motor coordination training, ROM (Range of Motion), strengthening, transfer training  Therapy Frequency:  (3-14x a week)  Plan of Care Review  Plan Of Care Reviewed With: patient  Progress: progress towards functional goals is fair  Outcome Summary/Follow up Plan: Pt reports he is feeling okay today tolerating all therapeutic exercises and ADL task to increase overall endurance for functional ADL/IADL's upon D/C. Pt will continue to benefit from skilled OT services to address deficits in functional independence.        Outcome Measures       03/02/17 0846 03/01/17 1310 03/01/17 0927    How much help from another person do you currently need...    Turning from your back to your side while in flat bed without using bedrails?   4  -TW    Moving from lying on back to sitting on the side of a flat bed without bedrails?   3  -TW    Moving to and from a bed to a chair (including a wheelchair)?   3  -TW    Standing up from a chair using your arms (e.g., wheelchair, bedside chair)?   3  -TW    Climbing 3-5 steps with a railing?   3  -TW    To walk in hospital room?   3  -TW    AM-PAC 6 Clicks Score   19  -TW    How much help from another is currently needed...    Putting on and taking off regular lower body clothing? 2  -BL 2  -JH     Bathing (including washing,  rinsing, and drying) 2  -BL 2  -JH     Toileting (which includes using toilet bed pan or urinal) 2  -BL 2  -JH     Putting on and taking off regular upper body clothing 2  -BL 2  -JH     Taking care of personal grooming (such as brushing teeth) 3  -BL 2  -JH     Eating meals 3  -BL 3  -     Score 14  -BL 13  -     Functional Assessment    Outcome Measure Options AM-PAC 6 Clicks Daily Activity (OT)  -  AM-PAC 6 Clicks Basic Mobility (PT)  -      02/28/17 1500 02/28/17 1121       How much help from another person do you currently need...    Turning from your back to your side while in flat bed without using bedrails?  4  -EZ     Moving from lying on back to sitting on the side of a flat bed without bedrails?  3  -EZ     Moving to and from a bed to a chair (including a wheelchair)?  3  -EZ     Standing up from a chair using your arms (e.g., wheelchair, bedside chair)?  3  -EZ     Climbing 3-5 steps with a railing?  3  -EZ     To walk in hospital room?  3  -EZ     AM-PAC 6 Clicks Score  19  -EZ     How much help from another is currently needed...    Putting on and taking off regular lower body clothing? 2  -BH      Bathing (including washing, rinsing, and drying) 2  -BH      Toileting (which includes using toilet bed pan or urinal) 2  -BH      Putting on and taking off regular upper body clothing 3  -BH      Taking care of personal grooming (such as brushing teeth) 3  -BH      Eating meals 4  -      Score 16  -      Functional Assessment    Outcome Measure Options AM-PAC 6 Clicks Daily Activity (OT)  -[facility] 6 Clicks Basic Mobility (PT)  -       User Key  (r) = Recorded By, (t) = Taken By, (c) = Cosigned By    Initials Name Provider Type     Pina Almeida, OTR/L Occupational Therapist    EZ Samantha Stone, PT Physical Therapist    TW Raoul Devine, PTA Physical Therapy Assistant     Montserrat Patel, SEVERINO/L Occupational Therapy Assistant     Sandra Davison, SEVERINO/L Occupational Therapy Assistant            Time Calculation:         Time Calculation- OT       03/02/17 0917          Time Calculation- OT    OT Start Time 0846  -BL      OT Stop Time 0915  -BL      OT Time Calculation (min) 29 min  -BL      Total Timed Code Minutes- OT 29 minute(s)  -BL      OT Received On 03/02/17  -BL        User Key  (r) = Recorded By, (t) = Taken By, (c) = Cosigned By    Initials Name Provider Type     Sandra Davison SEVERINO/L Occupational Therapy Assistant           Therapy Charges for Today     Code Description Service Date Service Provider Modifiers Qty    83806253019 HC OT SELF CARE/MGMT/TRAIN EA 15 MIN 3/2/2017 Sandra Davison SEVERINO/L GO 1    05688701444 HC OT THER PROC EA 15 MIN 3/2/2017 Sandra Davison SEVERINO/L GO 1          OT G-codes  OT Professional Judgement Used?: Yes  OT Functional Scales Options: AM-PAC 6 Clicks Daily Activity (OT)  Score: 16  Functional Limitation: Self care  Self Care Current Status (): At least 40 percent but less than 60 percent impaired, limited or restricted  Self Care Goal Status (): At least 20 percent but less than 40 percent impaired, limited or restricted    Sandra Davison SEVERINO/L  3/2/2017

## 2017-03-03 NOTE — PROGRESS NOTES
[facility] Medicine Services  INPATIENT PROGRESS NOTE     LOS: 10 days   Patient Care Team:  Klaus Faria MD as PCP - General    Chief Complaint:  Complains of having shortness of breath.      Subjective     Interval History:     Patient Complaints: Patient seems to be resting comfortably.  Patient denies any complaints.    History taken from: patient    Review of Systems:    Review of Systems   Constitutional: Negative for appetite change, chills, diaphoresis and fever.   HENT: Negative for congestion, rhinorrhea, sore throat and trouble swallowing.    Eyes: Negative for visual disturbance.   Respiratory: Positive for shortness of breath and wheezing. Negative for cough and chest tightness.    Cardiovascular: Negative for chest pain, palpitations and leg swelling.   Gastrointestinal: Negative for abdominal pain, blood in stool, diarrhea, nausea and vomiting.   Endocrine: Negative for cold intolerance and heat intolerance.   Genitourinary: Negative for decreased urine volume and difficulty urinating.   Musculoskeletal: Negative for back pain, gait problem and neck pain.   Skin: Negative for rash.   Neurological: Positive for tremors and weakness. Negative for dizziness, syncope, light-headedness, numbness and headaches.   Psychiatric/Behavioral: The patient is not nervous/anxious.          Objective     Vital Signs  Temp:  [96.2 °F (35.7 °C)-98.1 °F (36.7 °C)] 98.1 °F (36.7 °C)  Heart Rate:  [] 75  Resp:  [20-30] 20  BP: (167-175)/(72-81) 175/81    Physical Exam:   Physical Exam   Constitutional: He is oriented to person, place, and time. He appears well-developed and well-nourished.   HENT:   Head: Normocephalic and atraumatic.   Nose: Nose normal.   Eyes: Conjunctivae and EOM are normal. Pupils are equal, round, and reactive to light.   Neck: Normal range of motion. Neck supple. No JVD present. No tracheal deviation present. No thyromegaly present.    Cardiovascular: Normal rate, regular rhythm, normal heart sounds and intact distal pulses.    Pulmonary/Chest: He is in respiratory distress. He has wheezes. He has rales (Crackles and Rales to 2/3rd of the Lung Fields bilateraly.). He exhibits no tenderness.   Abdominal: Soft. Bowel sounds are normal. He exhibits no distension. There is no tenderness. There is no rebound and no guarding.   Musculoskeletal: Normal range of motion. He exhibits no edema.   Lymphadenopathy:     He has no cervical adenopathy.   Neurological: He is alert and oriented to person, place, and time. He has normal reflexes. No cranial nerve deficit.   Skin: Skin is warm and dry.   Intact   Psychiatric: He has a normal mood and affect.   Nursing note and vitals reviewed.         Results Review:         Results from last 7 days  Lab Units 03/03/17 0652 03/02/17 0527 03/01/17 0734 02/28/17  0544 02/27/17  0524 02/26/17  0730 02/25/17  0720   SODIUM mmol/L 134* 134* 136* 134* 138 139 139   POTASSIUM mmol/L 3.9 3.5 3.6 3.6 3.7 4.0 3.8   CHLORIDE mmol/L 99 99 99 100 100 100 102   TOTAL CO2 mmol/L 26.0 27.0 26.0 27.0 29.0 29.0 25.0   BUN mg/dL 92* 88* 85* 79* 79* 71* 68*   CREATININE mg/dL 2.07* 2.10* 2.06* 2.01* 2.02* 2.09* 2.23*   GLUCOSE mg/dL 192* 220* 136* 192* 113* 127* 160*   CALCIUM mg/dL 8.8 9.0 9.0 9.0 9.1 9.6 9.3         Results from last 7 days  Lab Units 03/03/17 0652 03/02/17 0527 03/01/17 0734 02/28/17  0544 02/25/17  0720   MAGNESIUM mg/dL 2.6* 2.7* 2.6* 2.6*  --    PHOSPHORUS mg/dL  --   --   --   --  4.9*         Results from last 7 days  Lab Units 03/03/17 0652 03/02/17 0527 03/01/17  0634 02/28/17  0544 02/27/17  0524 02/26/17  0730 02/25/17  0720   WBC 10*3/mm3 9.73 8.96 9.99* 7.05 7.33 9.15 9.81*   HEMOGLOBIN g/dL 12.0* 12.4* 12.5* 12.0* 12.1* 12.6* 12.1*   HEMATOCRIT % 34.8* 36.1* 36.5* 35.2* 36.0* 38.0* 35.9*   PLATELETS 10*3/mm3 193 201 207 188 192 226 205         Results from last 7 days  Lab Units 03/03/17  0652  03/02/17  0527 03/01/17  1234 02/28/17  0544 02/27/17  0524 02/26/17  0730 02/25/17  0720   INR  3.37* 4.08* 2.87* 1.50* 1.78* 1.77* 1.96*        Imaging Results (last 7 days)     Procedure Component Value Units Date/Time    XR Chest 1 View [[id number]] Collected:  02/21/17 1158     Updated:  02/21/17 1205    Narrative:       [facility] [place]    Patient Name: MR. ELLEN MEDINA    ORDERING: BERTHA MILLERMARY     ATTENDING:    REFERRING: BERTHA ROYAL    -----------------------    PROCEDURE: Portable chest x-ray    TECHNIQUE: Single AP view of the chest    COMPARISON: 1/30/2017    HISTORY: Chest pain protocol    FINDINGS:     Life-support devices: Left-sided cardiac pacer stable in position    Lungs/pleura: Blunting of the costophrenic angles and prominent  perihilar markings bilaterally. No pneumothorax.    Heart, hilar and mediastinal structures: Cardiac silhouette  slightly enlarged. Sternal suture wires appear stable. Thoracic  aorta contains atherosclerotic calcification. Patient's chin  partially obscures the lung apices.      Impression:       CONCLUSION:  Small to moderate sized bilateral pleural effusions and  superimposed mild pulmonary edema.    Electronically signed by:  Clark Lucero MD  2/21/2017 12:03 PM  CST Workstation: [id number]    XR Chest PA & Lateral [[id number]] Collected:  02/23/17 1045     Updated:  02/23/17 1049    Narrative:       Patient Name:  MR. ELLEN MEDINA  Patient ID:  [id number]   Ordering:  TRINITY NAVAS  Attending:  NOHEMY POZO  Referring:  TRINITY NAVAS  ------------------------------------------------  Chest pain.    Chest, AP and lateral views.    Comparison is made with study dated February 21, 2017.    The cardiac silhouette is within normal limits. There are small  to moderate bilateral pleural effusions. There are bilateral  interstitial markings. There are median sternotomy wires. There  is pacemaker with two leads.      Impression:        CONCLUSION: Small to moderate bilateral pleural effusions.    Electronically signed by:  Nnamdi Marquis MD  2/23/2017 10:48  AM CST Workstation: [id number]    XR Chest PA & Lateral [[id number]] Collected:  02/27/17 0742     Updated:  02/27/17 0745    Narrative:       Patient Name:  MR. ELLEN MEDINA  Patient ID:  [id number]   Ordering:  CECY ARMENTA  Attending:  NOHEMY POZO  Referring:  CECY ARMENTA  ------------------------------------------------      PROCEDURE: Chest PA and lateral    REASON FOR EXAM: shortness of air, I50.9 Heart failure,  unspecified    FINDINGS: Comparison study dated February 23, 2017.  Postsurgical  changes with median sternotomy. Stable pacemaker and leads. .  Cardiac size appears within normal limits. Bibasilar small patchy  opacities. Lungs otherwise clear. Mild worsening of moderate  right pleural effusion. Small left pleural effusion. No acute  osseous abnormality.      Impression:       1.  Mild worsening of moderate right pleural effusion.  2.  Small left pleural effusion which appears stable.  3.  Bibasilar small patchy opacities suspicious for pulmonary  edema versus pneumonia versus subsegmental atelectasis.    Electronically signed by:  Remy Caballero MD  2/27/2017 7:44 AM CST  Workstation: [id number]                                   Medication Review:   Current Facility-Administered Medications   Medication Dose Route Frequency Provider Last Rate Last Dose   • acetaminophen (TYLENOL) tablet 650 mg  650 mg Oral Q6H PRN Adriana Wells MD   650 mg at 02/28/17 1450   • albuterol (PROVENTIL HFA;VENTOLIN HFA) inhaler 2 puff  2 puff Inhalation Q4H PRN Adriana Wells MD       • albuterol (PROVENTIL) nebulizer solution 0.083% 2.5 mg/3mL  2.5 mg Nebulization Q4H PRN LONNIE Leon       • atorvastatin (LIPITOR) tablet 20 mg  20 mg Oral Daily Adriana Wells MD   20 mg at 03/03/17 0858   • budesonide (PULMICORT) nebulizer solution 0.5 mg  0.5 mg  Nebulization BID - RT Zackary Anand MD   0.5 mg at 03/03/17 0734   • cholecalciferol (VITAMIN D3) tablet 1,000 Units  1,000 Units Oral Daily Adriana Wells MD   1,000 Units at 03/03/17 0858   • clopidogrel (PLAVIX) tablet 75 mg  75 mg Oral Daily Adriana Wells MD   75 mg at 03/03/17 0858   • dextrose (D50W) solution 25 g  25 g Intravenous Q15 Min PRN Adriana Wells MD       • dextrose (D50W) solution 50 mL  50 mL Intravenous Q1H PRN Stephani Bingham MD   50 mL at 02/22/17 0645   • dextrose (GLUTOSE) oral gel 15 g  15 g Oral Q15 Min PRN Adriana Wells MD       • diltiaZEM CD (CARDIZEM CD) 24 hr capsule 240 mg  240 mg Oral Daily Adriana Wells MD   240 mg at 03/03/17 0858   • escitalopram (LEXAPRO) tablet 10 mg  10 mg Oral Daily Adriana Wells MD   10 mg at 03/03/17 0857   • furosemide (LASIX) injection 40 mg  40 mg Intravenous Once Richi Hayward MD       • [START ON 3/4/2017] furosemide (LASIX) tablet 40 mg  40 mg Oral BID Richi Hayward MD       • glipiZIDE (GLUCOTROL) 24 hr tablet 2.5 mg  2.5 mg Oral Nightly Adriana Wells MD   2.5 mg at 03/02/17 2203   • glucagon (human recombinant) (GLUCAGEN DIAGNOSTIC) injection 1 mg  1 mg Subcutaneous Q15 Min PRN Adriana Wells MD       • hydrALAZINE (APRESOLINE) tablet 100 mg  100 mg Oral Q8H Richi Hayward MD   100 mg at 03/03/17 0512   • insulin aspart (novoLOG) injection 0-9 Units  0-9 Units Subcutaneous 4x Daily AC & at Bedtime Adriana Wells MD   2 Units at 03/03/17 0901   • insulin detemir (LEVEMIR) injection 15 Units  15 Units Subcutaneous Nightly Zackarybabs Anand MD   15 Units at 03/02/17 2203   • ipratropium (ATROVENT) nebulizer solution 0.5 mg  0.5 mg Nebulization 4x Daily - RT Zackary Anand MD   0.5 mg at 03/03/17 0727   • isosorbide mononitrate (IMDUR) 24 hr tablet 60 mg  60 mg Oral Q24H Zackary Anand MD   60 mg at 03/03/17 0858   • LORazepam (ATIVAN) injection 0.5 mg  0.5 mg Intravenous Q4H  PRN Jimbo Shelley MD   0.5 mg at 03/02/17 2037   • methylPREDNISolone sodium succinate (SOLU-Medrol) injection 40 mg  40 mg Intravenous Q12H Adriana Wells MD   40 mg at 03/03/17 0430   • pantoprazole (PROTONIX) EC tablet 40 mg  40 mg Oral Q AM Fabiana Wells MD   40 mg at 03/03/17 0512   • Pharmacy to dose warfarin   Does not apply Continuous PRN Zackary Anand MD       • phenol (CHLORASEPTIC) 1.4 % liquid 2 spray  2 spray Mouth/Throat Q2H PRN Fabiana Wells MD       • sodium chloride 0.9 % flush 10 mL  10 mL Intravenous PRN David Henderson MD   10 mL at 03/01/17 1448         Assessment/Plan     Principal Problem:    CHF (congestive heart failure)  Active Problems:    Nonrheumatic aortic valve stenosis    Persistent atrial fibrillation    Congestive heart failure    Aortic valve stenosis    Dyslipidemia    Essential hypertension    Kidney insufficiency    History of cerebrovascular accident    Tobacco dependence syndrome          -Patient's condition improved overnight.  -We'll continue patient on diuretics.  -Patient requesting anxiety medications.  -We'll continue to monitor patient.  -We'll continue with PT OT evaluation.  -We'll consider LTAC evaluation if patient agrees.  -DVT and GI prophylaxis in place.      Fabiana Wells MD  03/03/17  11:16 AM    EMR Dragon/Transcription disclaimer:   Much of this encounter note is an electronic transcription/translation of spoken language to printed text. The electronic translation of spoken language may permit erroneous, or at times, nonsensical words or phrases to be inadvertently transcribed; Although I have reviewed the note for such errors, some may still exist.

## 2017-03-03 NOTE — PROGRESS NOTES
Acute Care - Occupational Therapy Treatment Note  [facility]     Patient Name: Kevin Henning  : 1940  MRN: [id number]  Today's Date: 3/3/2017  Onset of Illness/Injury or Date of Surgery Date: 17  Date of Referral to OT: 17  Referring Physician: Dr. STEW Wells      Admit Date: 2017    Visit Dx:     ICD-10-CM ICD-9-CM   1. Congestive heart failure, unspecified congestive heart failure chronicity, unspecified congestive heart failure type I50.9 428.0   2. Impaired physical mobility Z74.09 781.99   3. Impaired mobility and ADLs Z74.09 799.89     Patient Active Problem List   Diagnosis   • CKD stage 3 due to type 2 diabetes mellitus   • DM II (diabetes mellitus, type II), controlled   • History of CVA in adulthood   • PVD (peripheral vascular disease)   • Pleural effusion   • History of atrial fibrillation   • Benign essential HTN   • Bradycardia   • Nonrheumatic aortic valve stenosis   • Dyspnea on exertion   • Persistent atrial fibrillation   • Congestive heart failure   • Aortic valve stenosis   • CHF (congestive heart failure)   • Dyslipidemia   • Essential hypertension   • Kidney insufficiency   • History of cerebrovascular accident   • Tobacco dependence syndrome             Adult Rehabilitation Note       17 0905 17 0846 17 1310    Rehab Assessment/Intervention    Discipline occupational therapy assistant  -KD occupational therapy assistant  -BL occupational therapy assistant  -    Document Type therapy note (daily note)  -KD therapy note (daily note)  - therapy note (daily note)  -    Subjective Information agree to therapy  -KD agree to therapy;complains of;weakness   shortness of breath  - agree to therapy;complains of;weakness;fatigue;pain;dyspnea  -    Symptoms Noted During/After Treatment shortness of breath  -KD fatigue;shortness of breath  -BL fatigue  -    Precautions/Limitations fall precautions  -KD fall precautions  -BL     Recorded by []  Mervat Valentine SEVERINO/L [BL] LILIAN VieiraA/L [JH] LILIAN BanksA/L    Vital Signs    Pre Systolic BP Rehab 135  -  -BL     Pre Treatment Diastolic BP 65  -KD 62  -BL     Pretreatment Heart Rate (beats/min) 69  -KD 61  -BL     Posttreatment Heart Rate (beats/min) 70  -KD 69  -BL     Pre SpO2 (%) 97  -KD 96  -BL 96  -JH    O2 Delivery Pre Treatment supplemental O2  -KD supplemental O2  -BL supplemental O2  -JH    Intra SpO2 (%)   94  -JH    O2 Delivery Intra Treatment   supplemental O2  -JH    Post SpO2 (%) 92  -KD 94  -BL 94  -JH    O2 Delivery Post Treatment supplemental O2  -KD supplemental O2  -BL supplemental O2  -JH    Pre Patient Position Supine  -KD Supine  -BL     Intra Patient Position Sitting  -KD Sitting  -BL     Post Patient Position Sitting  -KD Sitting  -BL     Rest Breaks  --   PRN  -KD      Recorded by [KD] Mervat Valentine SEVERINO/L [BL] LILIAN VieiraA/MAYELIN [JH] LILIAN BanksA/L    Pain Assessment    Pain Assessment No/denies pain   Just soreness  -KD No/denies pain  -BL     Recorded by [KD] LILIAN MacdonaldA/L [BL] LILIAN VieiraA/L     Cognitive Assessment/Intervention    Current Cognitive/Communication Assessment functional  -KD functional  -BL functional  -JH    Orientation Status oriented x 4  -KD oriented x 4  -BL oriented x 4  -JH    Follows Commands/Answers Questions 100% of the time  -% of the time  -% of the time  -JH    Personal Safety Interventions gait belt;nonskid shoes/slippers when out of bed  -KD      Recorded by [KD] Mervat Valentine SEVERINO/L [BL] LILIAN VieiraA/L [JH] LILIAN BanksA/L    Bed Mobility, Assessment/Treatment    Bed Mobility, Assistive Device bed rails;head of bed elevated  -KD      Bed Mobility, Roll Left, [place] conditional independence  -KD  independent  -JH    Bed Mobility, Roll Right, [place]   independent  -JH    Bed Mob, Supine to Sit, [place] conditional independence  -KD supervision required  -BL      Bed Mob, Sit to Supine, [place] not tested  -KD supervision required  -BL     Bed Mob, Sidelying to Sit, [place] conditional independence  -KD      Bed Mob, Sit to Sidelying, [place] not tested  -KD      Bed Mobility, Impairments strength decreased   SOA  -KD      Recorded by [KD] LILIAN MacdonaldA/L [BL] LILIAN VieiraA/L [JH] LILIAN BanksA/L    Transfer Assessment/Treatment    Transfers, Bed-Chair [place] contact guard assist  -KD      Transfers, Chair-Bed [place] not tested  -KD      Transfers, Bed-Chair-Bed, Assist Device rolling walker   gait belt  -KD      Transfers, Sit-Stand [place] contact guard assist  -KD      Transfers, Stand-Sit [place] contact guard assist  -KD      Transfers, Sit-Stand-Sit, Assist Device rolling walker  -KD      Toilet Transfer, [place] not tested  -KD      Walk-In Shower Transfer, [place] not tested  -KD      Bathtub Transfer, [place] not tested  -KD      Transfer, Comment   Pt decline fxnl transfer   -JH    Recorded by [KD] LILIAN MacdonaldA/MAYELIN  [JH] LILIAN BanksA/L    Functional Mobility    Functional Mobility- Ind. Level contact guard assist  -KD      Functional Mobility- Device rolling walker  -KD      Functional Mobility-Distance (Feet) 3  -KD      Functional Mobility- Safety Issues supplemental O2  -KD      Functional Mobility- Comment   Ptdecline to get up oob  -JH    Recorded by [KD] LILIAN MacdonaldA/L  [JH] LILIAN BanksA/L    Upper Body Bathing Assessment/Training    UB Bathing Assess/Train Assistive Device bath mitt  -KD      UB Bathing Assess/Train, Position sitting;edge of bed  -KD      UB Bathing Assess/Train, [place] Level contact guard assist  -KD      UB Bathing Assess/Train, Impairments strength decreased   SOA  -KD      Recorded by [KD] LILIAN MacdonaldA/L      Lower Body Bathing Assessment/Training    LB Bathing Assess/Train Assistive Device bath mitt  -KD      LB Bathing  Assess/Train, Position edge of bed;sitting  -KD      LB Bathing Assess/Train, [place] Level moderate assist (50% patient effort)  -KD      LB Bathing Assess/Train, Impairments strength decreased   SOA  -KD      Recorded by [KD] MAYCOL Macdonald/L      Upper Body Dressing Assessment/Training    UB Dressing Assess/Train, Clothing Type doffing:;donning:;hospital gown  -KD      UB Dressing Assess/Train, Position edge of bed;sitting  -KD      UB Dressing Assess/Train, [place] minimum assist (75% patient effort)  -KD      UB Dressing Assess/Train, Impairments strength decreased   SOA  -KD      Recorded by [KD] MAYCOL Macdonald/L      Lower Body Dressing Assessment/Training    LB Dressing Assess/Train, Clothing Type doffing:;donning:;slipper socks  -KD      LB Dressing Assess/Train, Position edge of bed;sitting  -KD      LB Dressing Assess/Train, [place] maximum assist (25% patient effort)  -KD      LB Dressing Assess/Train, Impairments strength decreased   SOA  -KD      Recorded by [KD] MAYCOL Macdonald/L      Toileting Assessment/Training    Toileting Assess/Train, Assistive Device urinal  -KD urinal  -BL     Toileting Assess/Train, Position sitting   BSChair  -KD supine;sitting  -BL     Toileting Assess/Train, Indepen Level set up required  -KD set up required  -BL     Toileting Assess/Train, Impairments strength decreased  -KD strength decreased  -BL     Toileting Assess/Train, Comment  No OOB completed this date secondary to pt's O2 saturation decreasing very easily. Pt has moderate difficulty improving O2 with rest breaks.  -BL     Recorded by [KD] MAYCOL Macdonald/MAYELIN [BL] MAYCOL Vieira/L     Grooming Assessment/Training    Grooming Assess/Train, Assistive Device --   wash mitt  -KD --   Wash mitt  -BL     Grooming Assess/Train, Position sitting  -KD sitting  -BL     Grooming Assess/Train, Indepen Level set up required  -KD set up required  -BL     Grooming Assess/Train,  Impairments strength decreased  -KD strength decreased   decreased O2/endurance  -BL     Grooming Assess/Train, Comment Pt needed RB's PRN to complete ADL task.  - Pt demo's decreased endurance and activity tolerance for grooming task this date requiring multiple rest breaks to fully complete task.  -     Recorded by [KD] MAYCOL Macdonald/MAYELIN [BL] MAYCOL Vieira/MAYELIN     Balance Skills Training    Sitting-Level of Assistance Close supervision  -      Sitting-Balance Support --   In BSChair 2' SOA sitting EOB w/o support  -      Sitting-Balance Activities --   ADL  -      Sitting # of Minutes 49  -KD      Recorded by [KD] MAYCOL Macdonald/L      Therapy Exercises    Bilateral Upper Extremity  AROM:;15 reps;sitting;elbow flexion/extension;hand pumps;pronation/supination;shoulder extension/flexion;shoulder horizontal abd/add  -BL AROM:;10 reps;15 reps;supine;sitting;elbow flexion/extension;pronation/supination;shoulder abduction/adduction;shoulder extension/flexion  -    BUE Resistance  --   2 pound weight as requested by patient   -BL manual resistance- minimal  -    Recorded by  [BL] MAYCOL Vieira/MAYELIN [JH] MAYCOL Banks/L    Positioning and Restraints    Pre-Treatment Position in bed  - in bed  -BL in bed  -    Post Treatment Position chair  - bed  - bed  -    In Bed  notified nsg;supine;call light within reach;encouraged to call for assist;exit alarm on  - supine;call light within reach;encouraged to call for assist  -    In Chair reclined;sitting;call light within reach;encouraged to call for assist;exit alarm on;notified nsg  -KD      Recorded by [KD] MAYCOL Macdonald/MAYELIN [BL] MAYCOL Vieira/AMYELIN [JH] OBED Banks      03/01/17 0963          Rehab Assessment/Intervention    Discipline physical therapy assistant  -TW      Document Type therapy note (daily note)  -TW      Subjective Information agree to therapy;complains of;weakness;fatigue  -TW       Patient Effort, Rehab Treatment fair  -TW      Symptoms Noted During/After Treatment fatigue;shortness of breath  -TW      Precautions/Limitations fall precautions;oxygen therapy device and L/min  -TW      Recorded by [TW] Raoul Devine PTA      Vital Signs    Pretreatment Heart Rate (beats/min) 84  -TW      Intratreatment Heart Rate (beats/min) 86  -TW      Posttreatment Heart Rate (beats/min) 74  -TW      Pre SpO2 (%) 94  -TW      O2 Delivery Pre Treatment supplemental O2  -TW      Intra SpO2 (%) 89  -TW      Post SpO2 (%) 96  -TW      Pre Patient Position Supine  -TW      Intra Patient Position Standing  -TW      Post Patient Position Sitting  -TW      Recorded by [TW] Raoul Devine PTA      Pain Assessment    Pain Assessment No/denies pain  -TW      Recorded by [TW] Raoul Devine PTA      Cognitive Assessment/Intervention    Current Cognitive/Communication Assessment functional  -TW      Orientation Status oriented x 4  -TW      Follows Commands/Answers Questions 100% of the time;able to follow single-step instructions;needs cueing  -TW      Recorded by [TW] Raoul Devine PTA      Bed Mobility, Assessment/Treatment    Bed Mobility, Assistive Device bed rails;head of bed elevated  -TW      Bed Mobility, Roll Left, [place] independent  -TW      Bed Mobility, Roll Right, [place] independent  -TW      Bed Mobility, Scoot/Bridge, [place] supervision required  -TW      Bed Mob, Supine to Sit, [place] supervision required  -TW      Bed Mob, Sit to Supine, [place] supervision required  -TW      Recorded by [TW] Raoul Devine PTA      Transfer Assessment/Treatment    Transfers, Bed-Chair [place] contact guard assist  -TW      Transfers, Chair-Bed [place] not tested  -TW      Transfers, Bed-Chair-Bed, Assist Device other (see comments)   HHA  -TW      Recorded by [TW] Raoul Devine PTA      Gait Assessment/Treatment    Gait, Comment --   Pt defferred  gait stating he was too SOA.  -TW      Recorded by [TW] Raoul Devine PTA      Therapy Exercises    Bilateral Lower Extremities AROM:;15 reps;supine;ankle pumps/circles;glut sets;quad sets;sitting;hip abduction/adduction;hip flexion;LAQ  -TW      Recorded by [TW] Raoul Devine PTA      Positioning and Restraints    Pre-Treatment Position in bed  -TW      Post Treatment Position chair  -TW      In Bed supine  -TW      In Chair reclined;call light within reach;encouraged to call for assist  -TW      Recorded by [TW] Raoul Devine PTA        User Key  (r) = Recorded By, (t) = Taken By, (c) = Cosigned By    Initials Name Effective Dates    TW Raoul Devine PTA 10/17/16 -     KD Mervat Valentine, SEVERINO/L 10/17/16 -      Montserrat Patel, SEVERINO/L 10/17/16 -      Sandra MAYELIN Davison, SEVERINO/L 10/17/16 -                 OT Goals       03/03/17 0905 03/02/17 0913 03/01/17 1310    Patient Education OT LTG    Patient Education OT LTG, Date Goal Reviewed 03/03/17  -KD 03/02/17  -BL 03/01/17  -    Patient Education OT LTG Outcome   goal ongoing  -    Patient Education OT LTG, Reason Goal Not Met   unable to make needed progress  -    ADL OT LTG    ADL OT LTG, Date Goal Reviewed 03/03/17  -KD 03/02/17  -BL 03/01/17  -    ADL OT LTG, Outcome   goal ongoing  -    ADL OT LTG, Reason Goal Not Met   unable to make needed progress  -    Activity Tolerance OT STG    Activity Tolerance Goal OT STG, Date Goal Reviewed 03/03/17  -KD 03/02/17  -BL 03/01/17  -    Activity Tolerance Goal OT STG, Outcome   goal ongoing  -    Activity Tolerance Goal OT STG, Reason Goal Not Met   unable to make needed progress  -    Endurance OT LTG    Endurance Goal OT LTG, Date Goal Reviewed 03/03/17  -KD 03/02/17  -BL 03/01/17  -    Endurance Goal OT LTG, Outcome   goal ongoing  -    Endurance Goal OT LTG, Reason Goal Not Met   unable to make needed progress  -      02/28/17 1500          Patient Education OT LTG     Patient Education OT LTG, Date Established 02/28/17  -      Patient Education OT LTG, Time to Achieve by discharge  -      Patient Education OT LTG, Education Type HEP;home safety  -      Patient Education OT LTG, Education Understanding verbalizes understanding;demonstrates adequately;independent  -      ADL OT LTG    ADL OT LTG, Date Established 02/28/17  -      ADL OT LTG, Time to Achieve by discharge  -      ADL OT LTG, Activity Type ADL skills  -      ADL OT LTG, [place] Level min verbal cues;standby assist   AE as needed  -      Activity Tolerance OT STG    Activity Tolerance Goal OT STG, Date Established 02/28/17  -      Activity Tolerance Goal OT STG, Time to Achieve 1 wk  -      Activity Tolerance Goal OT STG, Activity Level 10 min activity;O2 sat >/equal to 90%;with 2 rest breaks  -      Endurance OT LTG    Endurance Goal OT LTG, Date Established 02/28/17  -      Endurance Goal OT LTG, Time to Achieve by discharge  -      Endurance Goal OT LTG, Activity Level endurance 2 good-   15 minute act  -        User Key  (r) = Recorded By, (t) = Taken By, (c) = Cosigned By    Initials Name Provider Type     Pina Almeida OTR/L Occupational Therapist     LILIAN MacdonaldA/L Occupational Therapy Assistant     MAYCOL Banks/L Occupational Therapy Assistant    MATTHIEU Davison SEVERINO/L Occupational Therapy Assistant          Occupational Therapy Education     Title: PT OT SLP Therapies (Active)     Topic: Occupational Therapy (Active)     Point: ADL training (Active)    Description: Instruct learner(s) on proper safety adaptation and remediation techniques during self care or transfers.   Instruct in proper use of assistive devices.    Learning Progress Summary    Learner Readiness Method Response Comment Documented by Status   Patient Acceptance E NR  DWAIN 03/03/17 1144 Active    Acceptance E VU   03/01/17 1429 Done               Point: Home exercise program (Active)     Description: Instruct learner(s) on appropriate technique for monitoring, assisting and/or progressing therapeutic exercises/activities.    Learning Progress Summary    Learner Readiness Method Response Comment Documented by Status   Patient Acceptance E NR   03/03/17 1144 Active    Eager E,D VU PLB and EC/WS techniques educated on today verbalizing understanding.  03/02/17 0912 Done    Acceptance E VU   03/01/17 1429 Done               Point: Precautions (Active)    Description: Instruct learner(s) on prescribed precautions during self-care and functional transfers.    Learning Progress Summary    Learner Readiness Method Response Comment Documented by Status   Patient Acceptance E NR   03/03/17 1144 Active    Eager E,D VU PLB and EC/WS techniques educated on today verbalizing understanding.  03/02/17 0912 Done    Acceptance E VU   03/01/17 1429 Done    Acceptance E VU,NR Pt educated to call and not get up on his own. Pt educated on safety with t/f and during eval. Pt educated about OT and POC.  02/28/17 1547 Done               Point: Body mechanics (Active)    Description: Instruct learner(s) on proper positioning and spine alignment during self-care, functional mobility activities and/or exercises.    Learning Progress Summary    Learner Readiness Method Response Comment Documented by Status   Patient Acceptance E NR   03/03/17 1144 Active    Eager E,D VU PLB and EC/WS techniques educated on today verbalizing understanding.  03/02/17 0912 Done    Acceptance E VU   03/01/17 1429 Done    Acceptance E VU,NR Pt educated to call and not get up on his own. Pt educated on safety with t/f and during eval. Pt educated about OT and POC.  02/28/17 1547 Done                      User Key     Initials Effective Dates Name Provider Type Discipline     10/17/16 -  OTTO Jacob/L Occupational Therapist OT     10/17/16 -  MAYCOL Macdonald/L Occupational Therapy Assistant OT     10/17/16 -   OBED Banks Occupational Therapy Assistant OT    BL 10/17/16 -  OBED Vieira Occupational Therapy Assistant OT                  OT Recommendation and Plan  Anticipated Discharge Disposition: skilled nursing facility, inpatient rehabilitation facility (TCU/LTACH/SNF)  Planned Therapy Interventions: activity intolerance, adaptive equipment training, ADL retraining, IADL retraining, balance training, bed mobility training, energy conservation, fine motor coordination training, home exercise program, motor coordination training, ROM (Range of Motion), strengthening, transfer training  Therapy Frequency:  (3-14x a week)  Plan of Care Review  Outcome Summary/Follow up Plan: Pt with decreased act tolerance for ADL 2' SOA        Outcome Measures       03/03/17 0905 03/02/17 1100 03/02/17 0846    How much help from another person do you currently need...    Turning from your back to your side while in flat bed without using bedrails?  4  -MP     Moving from lying on back to sitting on the side of a flat bed without bedrails?  4  -MP     Moving to and from a bed to a chair (including a wheelchair)?  3  -MP     Standing up from a chair using your arms (e.g., wheelchair, bedside chair)?  3  -MP     Climbing 3-5 steps with a railing?  3  -MP     To walk in hospital room?  3  -MP     AM-PAC 6 Clicks Score  20  -MP     How much help from another is currently needed...    Putting on and taking off regular lower body clothing? 2  -KD  2  -BL    Bathing (including washing, rinsing, and drying) 2  -KD  2  -BL    Toileting (which includes using toilet bed pan or urinal) 2  -KD  2  -BL    Putting on and taking off regular upper body clothing 2  -KD  2  -BL    Taking care of personal grooming (such as brushing teeth) 3  -KD  3  -BL    Eating meals 3  -KD  3  -BL    Score 14  -KD  14  -BL    Functional Assessment    Outcome Measure Options  AM-PAC 6 Clicks Basic Mobility (PT)  -MP AM-PAC 6 Clicks Daily Activity (OT)   -BL      03/01/17 1310 03/01/17 0927 02/28/17 1500    How much help from another person do you currently need...    Turning from your back to your side while in flat bed without using bedrails?  4  -TW     Moving from lying on back to sitting on the side of a flat bed without bedrails?  3  -TW     Moving to and from a bed to a chair (including a wheelchair)?  3  -TW     Standing up from a chair using your arms (e.g., wheelchair, bedside chair)?  3  -TW     Climbing 3-5 steps with a railing?  3  -TW     To walk in hospital room?  3  -TW     AM-PAC 6 Clicks Score  19  -TW     How much help from another is currently needed...    Putting on and taking off regular lower body clothing? 2  -  2  -BH    Bathing (including washing, rinsing, and drying) 2  -  2  -BH    Toileting (which includes using toilet bed pan or urinal) 2  -  2  -BH    Putting on and taking off regular upper body clothing 2  -  3  -BH    Taking care of personal grooming (such as brushing teeth) 2  -  3  -BH    Eating meals 3  -  4  -    Score 13  -JH  16  -BH    Functional Assessment    Outcome Measure Options  AM-PAC 6 Clicks Basic Mobility (PT)  - AM-PAC 6 Clicks Daily Activity (OT)  -      User Key  (r) = Recorded By, (t) = Taken By, (c) = Cosigned By    Initials Name Provider Type     Pina Almeida, OTR/L Occupational Therapist     Raoul Devine PTA Physical Therapy Assistant     Mervat Valentine SEVERINO/L Occupational Therapy Assistant     Montserrat Patel SEVERINO/L Occupational Therapy Assistant     Sandra Davison SEVERINO/L Occupational Therapy Assistant    ARNOL Robison, PT Physical Therapist           Time Calculation:         Time Calculation- OT       03/03/17 1144          Time Calculation- OT    OT Start Time 0905  -KD      OT Stop Time 1000  -KD      OT Time Calculation (min) 55 min  -KD      Total Timed Code Minutes- OT 55 minute(s)  -KD      OT Received On 03/03/17  -        User Key  (r) = Recorded By, (t)  = Taken By, (c) = Cosigned By    Initials Name Provider Type    MAYCOL Miranda/L Occupational Therapy Assistant           Therapy Charges for Today     Code Description Service Date Service Provider Modifiers Qty    [id number] HC OT SELF CARE/MGMT/TRAIN EA 15 MIN 3/3/2017 OBED Macdonald GO 4          OT G-codes  OT Professional Judgement Used?: Yes  OT Functional Scales Options: AM-PAC 6 Clicks Daily Activity (OT)  Score: 16  Functional Limitation: Self care  Self Care Current Status (): At least 40 percent but less than 60 percent impaired, limited or restricted  Self Care Goal Status (): At least 20 percent but less than 40 percent impaired, limited or restricted    OBED Macdonald  3/3/2017

## 2017-03-03 NOTE — PROGRESS NOTES
"Anticoagulation by Pharmacy - Warfarin    Kevin Henning is a 76 y.o.male  [Ht: 65.98\" (167.6 cm); Wt: 177 lb (80.3 kg)] on Warfarin for a.fib.  Warfarin is being held due to elevated INR.  Today's INR:   Lab Results   Component Value Date    INR 3.37 (H) 03/03/2017         Lab Results   Component Value Date    INR 3.37 (H) 03/03/2017    INR 4.08 (H) 03/02/2017    INR 2.87 (H) 03/01/2017    PROTIME 32.9 (H) 03/03/2017    PROTIME 38.0 (H) 03/02/2017    PROTIME 29.2 (H) 03/01/2017     Lab Results   Component Value Date    HGB 12.0 (L) 03/03/2017    HGB 12.4 (L) 03/02/2017    HGB 12.5 (L) 03/01/2017     Lab Results   Component Value Date    HCT 34.8 (L) 03/03/2017    HCT 36.1 (L) 03/02/2017    HCT 36.5 (L) 03/01/2017     Assessment/Plan:  Continue to hold warfarin.    Enrique Terry III, [facility]  03/03/17 2:04 PM    "

## 2017-03-03 NOTE — PROGRESS NOTES
TWO PATIENT IDENTIFIERS WERE USED. CONSENT WAS SIGNED PER PATIENT EDUCATION MATERIAL WAS GIVEN TO PATIENT AND / OR FAMILY. THE PATIENT WAS DRAPED WITH FULL BODY DRAPE AND PATIENT'SRIGHT   ARM WAS PREPPED WITH CHLORAPREP.  ULTRASOUND WAS USED TO LOCALIZE THERIGHT BASILIC VEIN. SUBCUTANEOUS TISSUE AT THE CATHETER SITE WAS INFILTRATED WITH 2% LIDOCAINE. UNDER ULTRASOUND GUIDANCE, THE VEIN WAS ACCESSED WITH A 21GAUGE MICROPUNCTURE NEEDLE. AN 0.018 WIRE WAS THEN THREADED THROUGH THE NEEDLE INTO THE CENTRAL VENOUS SYSTEM. THE 21GAUGE MICROPUNCTURE NEEDLE WAS REMOVED AND A 6 FRENCHPEEL AWAY SHEATH WAS PLACED OVER THE WIRE. THE PICC LINE CATHETER WAS CUT AT 37 CM. THE PICC LINE CATHETER WAS THEN PLACED OVER THE WIRE INTO THE VEIN, THE SHEATH WAS PEELED AWAY,WIRE WAS REMOVED. CATHETER WAS FLUSHED WITH NORMAL SALINE AND TIPS APPLIED. BIOPATCH PLACED. CATHETER SECURED WITHSTATLOCK  AND TEGADERM. PATIENT TOLERATED PROCEDURE WELL. THIS WAS DONE IN   ANGIOSUITE      IMPRESSION: SUCCESSFUL PLACEMENT OF TRIPLE LUMEN PICC        Chani Woods  3/3/2017  11:50 AM

## 2017-03-03 NOTE — PLAN OF CARE
Problem: Patient Care Overview (Adult)  Goal: Plan of Care Review  Outcome: Ongoing (interventions implemented as appropriate)    03/03/17 0905   Outcome Evaluation   Outcome Summary/Follow up Plan Pt with decreased act tolerance for ADL 2' SOA       Goal: Discharge Needs Assessment  Outcome: Ongoing (interventions implemented as appropriate)    02/22/17 0951 02/28/17 1500 03/01/17 1739   Discharge Needs Assessment   Concerns To Be Addressed --  --  discharge planning concerns   Readmission Within The Last 30 Days --  --  no previous admission in last 30 days   Equipment Needed After Discharge --  --  oxygen   Discharge Facility/Level Of Care Needs --  --  home with home health   Discharge Disposition --  --  home healthcare service   Discharge Planning Comments Pt may require Home O2. Pt may benefit from  services.  --  --    Current Health   Outpatient/Agency/Support Group Needs --  --  homecare agency (specify level of care)   Anticipated Changes Related to Illness --  --  none   Self-Care   Equipment Currently Used at Home --  cane, quad;cane, straight;grab bar --    Living Environment   Transportation Available --  --  car;family or friend will provide         Problem: Inpatient Occupational Therapy  Goal: Patient Education Goal LTG- OT  Outcome: Ongoing (interventions implemented as appropriate)    02/28/17 1500 03/01/17 1310 03/03/17 0905   Patient Education OT LTG   Patient Education OT LTG, Date Established 02/28/17 --  --    Patient Education OT LTG, Time to Achieve by discharge --  --    Patient Education OT LTG, Education Type HEP;home safety --  --    Patient Education OT LTG, Education Understanding verbalizes understanding;demonstrates adequately;independent --  --    Patient Education OT LTG, Date Goal Reviewed --  --  03/03/17   Patient Education OT LTG Outcome --  goal ongoing --    Patient Education OT LTG, Reason Goal Not Met --  unable to make needed progress --        Goal: ADL Goal LTG-  OT  Outcome: Ongoing (interventions implemented as appropriate)    02/28/17 1500 03/01/17 1310 03/03/17 0905   ADL OT LTG   ADL OT LTG, Date Established 02/28/17 --  --    ADL OT LTG, Time to Achieve by discharge --  --    ADL OT LTG, Activity Type ADL skills --  --    ADL OT LTG, [place] Level min verbal cues;standby assist  (AE as needed) --  --    ADL OT LTG, Date Goal Reviewed --  --  03/03/17   ADL OT LTG, Outcome --  goal ongoing --    ADL OT LTG, Reason Goal Not Met --  unable to make needed progress --        Goal: Activity Tolerance Goal STG- OT  Outcome: Ongoing (interventions implemented as appropriate)    02/28/17 1500 03/01/17 1310 03/03/17 0905   Activity Tolerance OT STG   Activity Tolerance Goal OT STG, Date Established 02/28/17 --  --    Activity Tolerance Goal OT STG, Time to Achieve 1 wk --  --    Activity Tolerance Goal OT STG, Activity Level 10 min activity;O2 sat >/equal to 90%;with 2 rest breaks --  --    Activity Tolerance Goal OT STG, Date Goal Reviewed --  --  03/03/17   Activity Tolerance Goal OT STG, Outcome --  goal ongoing --    Activity Tolerance Goal OT STG, Reason Goal Not Met --  unable to make needed progress --        Goal: Endurance Goal LTG- OT  Outcome: Ongoing (interventions implemented as appropriate)    02/28/17 1500 03/01/17 1310 03/03/17 0905   Endurance OT LTG   Endurance Goal OT LTG, Date Established 02/28/17 --  --    Endurance Goal OT LTG, Time to Achieve by discharge --  --    Endurance Goal OT LTG, Activity Level endurance 2 good-  (15 minute act) --  --    Endurance Goal OT LTG, Date Goal Reviewed --  --  03/03/17   Endurance Goal OT LTG, Outcome --  goal ongoing --    Endurance Goal OT LTG, Reason Goal Not Met --  unable to make needed progress --

## 2017-03-03 NOTE — PLAN OF CARE
Problem: Patient Care Overview (Adult)  Goal: Plan of Care Review  Outcome: Ongoing (interventions implemented as appropriate)    03/03/17 1330   Coping/Psychosocial Response Interventions   Plan Of Care Reviewed With patient   Patient Care Overview   Progress progress toward functional goals as expected   Outcome Evaluation   Outcome Summary/Follow up Plan declined oob/eob due to soa,sats above 90% with ex-would benefit from tcu/snf prior to d/c home       Goal: Discharge Needs Assessment  Outcome: Ongoing (interventions implemented as appropriate)    02/22/17 0951 02/28/17 1500 03/01/17 1739   Discharge Needs Assessment   Concerns To Be Addressed --  --  discharge planning concerns   Readmission Within The Last 30 Days --  --  no previous admission in last 30 days   Equipment Needed After Discharge --  --  oxygen   Discharge Facility/Level Of Care Needs --  --  --    Discharge Disposition --  --  home healthcare service   Discharge Planning Comments Pt may require Home O2. Pt may benefit from  services.  --  --    Current Health   Outpatient/Agency/Support Group Needs --  --  homecare agency (specify level of care)   Anticipated Changes Related to Illness --  --  none   Self-Care   Equipment Currently Used at Home --  cane, quad;cane, straight;grab bar --    Living Environment   Transportation Available --  --  car;family or friend will provide     03/03/17 1330   Discharge Needs Assessment   Concerns To Be Addressed --    Readmission Within The Last 30 Days --    Equipment Needed After Discharge --    Discharge Facility/Level Of Care Needs skilled transitional care   Discharge Disposition --    Discharge Planning Comments --    Current Health   Outpatient/Agency/Support Group Needs --    Anticipated Changes Related to Illness --    Self-Care   Equipment Currently Used at Home --    Living Environment   Transportation Available --          Problem: Inpatient Physical Therapy  Goal: Transfer Training Goal 1 LTG-  PT  Outcome: Ongoing (interventions implemented as appropriate)    02/28/17 1439 03/03/17 1330   Transfer Training PT LTG   Transfer Training PT LTG, Date Established 02/28/17 --    Transfer Training PT LTG, Time to Achieve by discharge --    Transfer Training PT LTG, [place] Level conditional independence --    Transfer Training PT LTG, Assist Device other (see comments)  (AAD) --    Transfer Training PT LTG, Date Goal Reviewed --  03/03/17   Transfer Training PT LTG, Outcome --  goal not met   Transfer Training PT LTG, Reason Goal Not Met --  progress slower than expected       Goal: Gait Training Goal STG- PT  Outcome: Ongoing (interventions implemented as appropriate)    02/28/17 1439 03/03/17 1330   Gait Training PT STG   Gait Training Goal PT STG, Date Established 02/28/17 --    Gait Training Goal PT STG, Time to Achieve 3 days --    Gait Training Goal PT STG, [place] Level supervision required --    Gait Training Goal PT STG, Assist Device other (see comments)  (AAD) --    Gait Training Goal PT STG, Distance to Achieve 50 --    Gait Training Goal PT STG, Additional Goal O2 sats will not drop below 92% --    Gait Training Goal PT STG, Date Goal Reviewed --  03/03/17   Gait Training Goal PT STG, Outcome --  goal not met   Gait Training Goal PT STG, Reason Goal Not Met --  progress slower than expected       Goal: Gait Training Goal LTG- PT  Outcome: Ongoing (interventions implemented as appropriate)    02/28/17 1439 03/03/17 1330   Gait Training PT LTG   Gait Training Goal PT LTG, Date Established 02/28/17 --    Gait Training Goal PT LTG, Time to Achieve by discharge --    Gait Training Goal PT LTG, [place] Level conditional independence --    Gait Training Goal PT LTG, Assist Device other (see comments)  (AAD) --    Gait Training Goal PT LTG, Distance to Achieve 150 --    Gait Training Goal PT LTG, Date Goal Reviewed --  03/03/17   Gait Training Goal PT LTG, Outcome --  goal not met   Gait  Training Goal PT LTG, Reason Goal Not Met --  progress slower than expected       Goal: Stair Training Goal LTG- PT  Outcome: Ongoing (interventions implemented as appropriate)    02/28/17 1439 03/03/17 1330   Stair Training PT LTG   Stair Training Goal PT LTG, Date Established 02/28/17 --    Stair Training Goal PT LTG, Time to Achieve by discharge --    Stair Training Goal PT LTG, Number of Steps 3 --    Stair Training Goal PT LTG, [place] Level conditional independence --    Stair Training Goal PT LTG, Assist Device 2 handrails;cane, quad --    Stair Training Goal PT LTG, Date Goal Reviewed --  03/03/17   Stair Training Goal PT LTG, Outcome --  goal not met   Stair Training Goal PT LTG, Reason Goal Not Met --  progress slower than expected       Goal: Strength Goal LTG- PT  Outcome: Ongoing (interventions implemented as appropriate)    02/28/17 1439 03/03/17 1330   Strength Goal PT LTG   Strength Goal PT LTG, Date Established 02/28/17 --    Strength Goal PT LTG, Time to Achieve by discharge --    Strength Goal PT LTG, Measure to Achieve Pt will perform 2 sets of 15 reps or AROM exercises --    Strength Goal PT LTG, Functional Goal Pt will progress to standing exercises --    Strength Goal PT LTG, Date Goal Reviewed --  03/03/17   Strength Goal PT LTG, Outcome --  goal not met   Strength Goal PT LTG, Reason Goal Not Met --  progress slower than expected       Goal: Patient Education Goal LTG- PT  Outcome: Ongoing (interventions implemented as appropriate)    02/28/17 1439 03/03/17 1330   Patient Education PT LTG   Patient Education PT LTG, Date Established 02/28/17 --    Patient Education PT LTG, Time to Achieve by discharge --    Patient Education PT LTG, Education Type written program;HEP;posture/body mechanics;benefits of activity;home safety --    Patient Education PT LTG, Date Goal Reviewed --  03/03/17   Patient Education PT LTG Outcome --  goal not met   Patient Education PT LTG, Reason Goal Not Met --   progress slower than expected

## 2017-03-03 NOTE — PROGRESS NOTES
Acute Care - Physical Therapy Treatment Note  [facility]     Patient Name: Kevin Henning  : 1940  MRN: [id number]  Today's Date: 3/3/2017  Onset of Illness/Injury or Date of Surgery Date: 17     Referring Physician: Dr. STEW Wells    Admit Date: 2017    Visit Dx:    ICD-10-CM ICD-9-CM   1. Congestive heart failure, unspecified congestive heart failure chronicity, unspecified congestive heart failure type I50.9 428.0   2. Impaired physical mobility Z74.09 781.99   3. Impaired mobility and ADLs Z74.09 799.89   4. Impaired gait and mobility R26.89 781.2     Patient Active Problem List   Diagnosis   • CKD stage 3 due to type 2 diabetes mellitus   • DM II (diabetes mellitus, type II), controlled   • History of CVA in adulthood   • PVD (peripheral vascular disease)   • Pleural effusion   • History of atrial fibrillation   • Benign essential HTN   • Bradycardia   • Nonrheumatic aortic valve stenosis   • Dyspnea on exertion   • Persistent atrial fibrillation   • Congestive heart failure   • Aortic valve stenosis   • CHF (congestive heart failure)   • Dyslipidemia   • Essential hypertension   • Kidney insufficiency   • History of cerebrovascular accident   • Tobacco dependence syndrome               Adult Rehabilitation Note       17 1330 17 0905 17 0846    Rehab Assessment/Intervention    Discipline physical therapy assistant  -LN occupational therapy assistant  -KD occupational therapy assistant  -BL    Document Type therapy note (daily note)  -LN therapy note (daily note)  -KD therapy note (daily note)  -BL    Subjective Information complains of;agree to therapy;dyspnea;fatigue   agreed to ther ex in supine,declined eob/oob  -LN agree to therapy  -KD agree to therapy;complains of;weakness   shortness of breath  -BL    Symptoms Noted During/After Treatment  shortness of breath  -KD fatigue;shortness of breath  -BL    Precautions/Limitations fall precautions  -LN fall precautions   -KD fall precautions  -BL    Recorded by [LN] Harriet River, PTA [KD] Mervat Valentine, SEVERINO/L [BL] LILIAN VieiraA/L    Vital Signs    Pre Systolic BP Rehab 150  -  -  -BL    Pre Treatment Diastolic BP 62  -LN 65  -KD 62  -BL    Post Systolic BP Rehab 150  -LN      Post Treatment Diastolic BP 60  -LN      Pretreatment Heart Rate (beats/min) 82  -LN 69  -KD 61  -BL    Posttreatment Heart Rate (beats/min) 80  -LN 70  -KD 69  -BL    Pre SpO2 (%) 96  -LN 97  -KD 96  -BL    O2 Delivery Pre Treatment supplemental O2  -LN supplemental O2  -KD supplemental O2  -BL    Intra SpO2 (%) 91  -LN      Post SpO2 (%) 94  -LN 92  -KD 94  -BL    O2 Delivery Post Treatment  supplemental O2  -KD supplemental O2  -BL    Pre Patient Position Supine  -LN Supine  -KD Supine  -BL    Intra Patient Position Supine  -LN Sitting  -KD Sitting  -BL    Post Patient Position Supine  -LN Sitting  -KD Sitting  -BL    Rest Breaks  --   as needed  -LN --   PRN  -KD     Recorded by [LN] Harriet River, PTA [KD] Mervat Valentine, SEVERINO/L [BL] Sandra Davison, SEVERINO/L    Pain Assessment    Pain Assessment 0-10  -LN No/denies pain   Just soreness  -KD No/denies pain  -BL    Pain Score 0  -LN      Post Pain Score 0  -LN      Recorded by [LN] Harriet River, PTA [KD] Mervat Valentine, SEVERINO/L [BL] Sandra Davison, SEVERINO/L    Cognitive Assessment/Intervention    Current Cognitive/Communication Assessment  functional  -KD functional  -BL    Orientation Status oriented x 4  -LN oriented x 4  -KD oriented x 4  -BL    Follows Commands/Answers Questions  100% of the time  -% of the time  -BL    Personal Safety Interventions  gait belt;nonskid shoes/slippers when out of bed  -KD     Recorded by [LN] Harriet River, PTA [KD] Mervat Valentine, SEVERINO/L [BL] Sandra Davison, SEVERINO/L    Bed Mobility, Assessment/Treatment    Bed Mobility, Assistive Device  bed rails;head of bed elevated  -KD     Bed Mobility, Roll Left, [place]  conditional independence  -KD     Bed Mob,  Supine to Sit, [place]  conditional independence  -KD supervision required  -BL    Bed Mob, Sit to Supine, [place]  not tested  -KD supervision required  -BL    Bed Mob, Sidelying to Sit, [place]  conditional independence  -KD     Bed Mob, Sit to Sidelying, [place]  not tested  -KD     Bed Mobility, Impairments  strength decreased   SOA  -KD     Recorded by  [KD] MAYCOL Macdonald/L [BL] LILIAN VieiraA/L    Transfer Assessment/Treatment    Transfers, Bed-Chair [place]  contact guard assist  -KD     Transfers, Chair-Bed [place]  not tested  -KD     Transfers, Bed-Chair-Bed, Assist Device  rolling walker   gait belt  -KD     Transfers, Sit-Stand [place]  contact guard assist  -KD     Transfers, Stand-Sit [place]  contact guard assist  -KD     Transfers, Sit-Stand-Sit, Assist Device  rolling walker  -KD     Toilet Transfer, [place]  not tested  -KD     Walk-In Shower Transfer, [place]  not tested  -KD     Bathtub Transfer, [place]  not tested  -KD     Recorded by  [KD] LILIAN MacdonaldA/L     Functional Mobility    Functional Mobility- Ind. Level  contact guard assist  -KD     Functional Mobility- Device  rolling walker  -KD     Functional Mobility-Distance (Feet)  3  -KD     Functional Mobility- Safety Issues  supplemental O2  -KD     Recorded by  [KD] Mervat Valentine SEVERINO/L     Upper Body Bathing Assessment/Training    UB Bathing Assess/Train Assistive Device  bath mitt  -KD     UB Bathing Assess/Train, Position  sitting;edge of bed  -KD     UB Bathing Assess/Train, [place] Level  contact guard assist  -KD     UB Bathing Assess/Train, Impairments  strength decreased   SOA  -KD     Recorded by  [KD] LILIAN MacdonaldA/L     Lower Body Bathing Assessment/Training    LB Bathing Assess/Train Assistive Device  bath mitt  -KD     LB Bathing Assess/Train, Position  edge of bed;sitting  -KD     LB Bathing Assess/Train, [place] Level  moderate  assist (50% patient effort)  -KD     LB Bathing Assess/Train, Impairments  strength decreased   SOA  -KD     Recorded by  [KD] MAYCOL Macdonald/L     Upper Body Dressing Assessment/Training    UB Dressing Assess/Train, Clothing Type  doffing:;donning:;hospital gown  -KD     UB Dressing Assess/Train, Position  edge of bed;sitting  -KD     UB Dressing Assess/Train, [place]  minimum assist (75% patient effort)  -KD     UB Dressing Assess/Train, Impairments  strength decreased   SOA  -KD     Recorded by  [KD] MAYCOL Macdonald/L     Lower Body Dressing Assessment/Training    LB Dressing Assess/Train, Clothing Type  doffing:;donning:;slipper socks  -KD     LB Dressing Assess/Train, Position  edge of bed;sitting  -KD     LB Dressing Assess/Train, [place]  maximum assist (25% patient effort)  -KD     LB Dressing Assess/Train, Impairments  strength decreased   SOA  -KD     Recorded by  [KD] MAYCOL Macdonald/L     Toileting Assessment/Training    Toileting Assess/Train, Assistive Device  urinal  -KD urinal  -BL    Toileting Assess/Train, Position  sitting   BSChair  -KD supine;sitting  -BL    Toileting Assess/Train, Indepen Level  set up required  -KD set up required  -BL    Toileting Assess/Train, Impairments  strength decreased  -KD strength decreased  -BL    Toileting Assess/Train, Comment   No OOB completed this date secondary to pt's O2 saturation decreasing very easily. Pt has moderate difficulty improving O2 with rest breaks.  -BL    Recorded by  [KD] MAYCOL Macdonald/MAYELIN [BL] MAYCOL Vieira/L    Grooming Assessment/Training    Grooming Assess/Train, Assistive Device  --   wash mitt  -KD --   Wash mitt  -BL    Grooming Assess/Train, Position  sitting  -KD sitting  -BL    Grooming Assess/Train, Indepen Level  set up required  -KD set up required  -BL    Grooming Assess/Train, Impairments  strength decreased  -KD strength decreased   decreased O2/endurance  -BL    Grooming Assess/Train,  Comment  Pt needed RB's PRN to complete ADL task.  -KD Pt demo's decreased endurance and activity tolerance for grooming task this date requiring multiple rest breaks to fully complete task.  -BL    Recorded by  [KD] OBED Macdonald [BL] OBED Vieira    Balance Skills Training    Sitting-Level of Assistance  Close supervision  -KD     Sitting-Balance Support  --   In BSChair 2' SOA sitting EOB w/o support  -KD     Sitting-Balance Activities  --   ADL  -KD     Sitting # of Minutes  49  -KD     Recorded by  [KD] OBED Macdonald     Therapy Exercises    Bilateral Lower Extremities AROM:;10 reps;supine;hip abduction/adduction;heel slides;SAQ   20 reps ankle pumps  -LN      Bilateral Upper Extremity   AROM:;15 reps;sitting;elbow flexion/extension;hand pumps;pronation/supination;shoulder extension/flexion;shoulder horizontal abd/add  -BL    BUE Resistance   --   2 pound weight as requested by patient   -BL    Recorded by [LN] Harriet River PTA  [BL] OBED Vieira    Positioning and Restraints    Pre-Treatment Position  in bed  -KD in bed  -BL    Post Treatment Position bed  -LN chair  -KD bed  -BL    In Bed supine;call light within reach;encouraged to call for assist;exit alarm on  -LN  notified nsg;supine;call light within reach;encouraged to call for assist;exit alarm on  -BL    In Chair  reclined;sitting;call light within reach;encouraged to call for assist;exit alarm on;notified nsg  -KD     Recorded by [LN] Harriet River PTA [KD] OBED Macdonald [BL] OBED Vieira      03/01/17 1310 03/01/17 0927       Rehab Assessment/Intervention    Discipline occupational therapy assistant  - physical therapy assistant  -     Document Type therapy note (daily note)  - therapy note (daily note)  -TW     Subjective Information agree to therapy;complains of;weakness;fatigue;pain;dyspnea  - agree to therapy;complains of;weakness;fatigue  -TW     Patient Effort, Rehab Treatment   fair  -TW     Symptoms Noted During/After Treatment fatigue  - fatigue;shortness of breath  -TW     Precautions/Limitations  fall precautions;oxygen therapy device and L/min  -TW     Recorded by [] MAYCOL Banks/MAYELIN [TW] Raoul Devine PTA     Vital Signs    Pretreatment Heart Rate (beats/min)  84  -TW     Intratreatment Heart Rate (beats/min)  86  -TW     Posttreatment Heart Rate (beats/min)  74  -TW     Pre SpO2 (%) 96  -JH 94  -TW     O2 Delivery Pre Treatment supplemental O2  - supplemental O2  -TW     Intra SpO2 (%) 94  -JH 89  -TW     O2 Delivery Intra Treatment supplemental O2  -      Post SpO2 (%) 94  -JH 96  -TW     O2 Delivery Post Treatment supplemental O2  -      Pre Patient Position  Supine  -TW     Intra Patient Position  Standing  -TW     Post Patient Position  Sitting  -TW     Recorded by [] MAYCOL Banks/MAYELIN [TW] Raoul Devine PTA     Pain Assessment    Pain Assessment  No/denies pain  -TW     Recorded by  [TW] Raoul Devine PTA     Cognitive Assessment/Intervention    Current Cognitive/Communication Assessment functional  - functional  -TW     Orientation Status oriented x 4  - oriented x 4  -TW     Follows Commands/Answers Questions 100% of the time  - 100% of the time;able to follow single-step instructions;needs cueing  -TW     Recorded by [] MAYCOL Banks/MAYELIN [TW] Raoul Devine PTA     Bed Mobility, Assessment/Treatment    Bed Mobility, Assistive Device  bed rails;head of bed elevated  -TW     Bed Mobility, Roll Left, [place] independent  - independent  -TW     Bed Mobility, Roll Right, [place] independent  - independent  -TW     Bed Mobility, Scoot/Bridge, [place]  supervision required  -TW     Bed Mob, Supine to Sit, [place]  supervision required  -TW     Bed Mob, Sit to Supine, [place]  supervision required  -TW     Recorded by [] MAYCOL Banks/MAYELIN [TW] Raoul Devine PTA     Transfer  Assessment/Treatment    Transfers, Bed-Chair [place]  contact guard assist  -TW     Transfers, Chair-Bed [place]  not tested  -TW     Transfers, Bed-Chair-Bed, Assist Device  other (see comments)   HHA  -TW     Transfer, Comment Pt decline fxnl transfer   -JH      Recorded by [] OBED Banks [TW] Raoul Devine PTA     Gait Assessment/Treatment    Gait, Comment  --   Pt defferred gait stating he was too SOA.  -TW     Recorded by  [TW] Raoul Devine PTA     Functional Mobility    Functional Mobility- Comment Ptdecline to get up oob  -JH      Recorded by [] OBED Banks      Therapy Exercises    Bilateral Lower Extremities  AROM:;15 reps;supine;ankle pumps/circles;glut sets;quad sets;sitting;hip abduction/adduction;hip flexion;LAQ  -TW     Bilateral Upper Extremity AROM:;10 reps;15 reps;supine;sitting;elbow flexion/extension;pronation/supination;shoulder abduction/adduction;shoulder extension/flexion  -JH      BUE Resistance manual resistance- minimal  -JH      Recorded by [] OBED Banks [TW] Raoul Devine PTA     Positioning and Restraints    Pre-Treatment Position in bed  -JH in bed  -TW     Post Treatment Position bed  -JH chair  -TW     In Bed supine;call light within reach;encouraged to call for assist  -JH supine  -TW     In Chair  reclined;call light within reach;encouraged to call for assist  -TW     Recorded by [] OBED Banks [TW] Raoul Devine PTA       User Key  (r) = Recorded By, (t) = Taken By, (c) = Cosigned By    Initials Name Effective Dates    LN Harriet River, PTA 10/17/16 -     TW Raoul Devine PTA 10/17/16 -     KD MAYCOL Macdonald/L 10/17/16 -     JH MAYCOL Banks/L 10/17/16 -     BL LILIAN VieiraA/MAYELIN 10/17/16 -                 IP PT Goals       03/03/17 1330 03/02/17 1123 03/01/17 0927    Transfer Training PT LTG    Transfer Training PT  LTG, Date Goal Reviewed 03/03/17  -LN 03/02/17  -MP  03/01/17  -TW    Transfer Training PT LTG, Outcome goal not met  -LN  goal ongoing  -TW    Transfer Training PT LTG, Reason Goal Not Met progress slower than expected  -LN      Gait Training PT STG    Gait Training Goal PT STG, Date Goal Reviewed 03/03/17  -LN 03/02/17  -MP 03/01/17  -TW    Gait Training Goal PT STG, Outcome goal not met  -LN  goal ongoing  -TW    Gait Training Goal PT STG, Reason Goal Not Met progress slower than expected  -LN      Gait Training PT LTG    Gait Training Goal PT LTG, Date Goal Reviewed 03/03/17  -LN 03/02/17  -MP 03/01/17  -TW    Gait Training Goal PT LTG, Outcome goal not met  -LN  goal ongoing  -TW    Gait Training Goal PT LTG, Reason Goal Not Met progress slower than expected  -LN      Stair Training PT LTG    Stair Training Goal PT LTG, Date Goal Reviewed 03/03/17  -LN 03/02/17  -MP 03/01/17  -TW    Stair Training Goal PT LTG, Outcome goal not met  -LN  goal ongoing  -TW    Stair Training Goal PT LTG, Reason Goal Not Met progress slower than expected  -LN      Strength Goal PT LTG    Strength Goal PT LTG, Date Goal Reviewed 03/03/17  -LN 03/02/17  -MP 03/01/17  -TW    Strength Goal PT LTG, Outcome goal not met  -LN  goal ongoing  -TW    Strength Goal PT LTG, Reason Goal Not Met progress slower than expected  -LN      Patient Education PT LTG    Patient Education PT LTG, Date Goal Reviewed 03/03/17  -LN 03/02/17  -MP 03/01/17  -TW    Patient Education PT LTG Outcome goal not met  -LN  goal ongoing  -TW    Patient Education PT LTG, Reason Goal Not Met progress slower than expected  -LN        02/28/17 1439          Transfer Training PT LTG    Transfer Training PT LTG, Date Established 02/28/17  -EZ      Transfer Training PT LTG, Time to Achieve by discharge  -EZ      Transfer Training PT LTG, [place] Level conditional independence  -EZ      Transfer Training PT LTG, Assist Device other (see comments)   AAD  -EZ      Transfer Training PT LTG, Outcome goal ongoing  -EZ      Gait  Training PT STG    Gait Training Goal PT STG, Date Established 02/28/17  -      Gait Training Goal PT STG, Time to Achieve 3 days  -      Gait Training Goal PT STG, [place] Level supervision required  -      Gait Training Goal PT STG, Assist Device other (see comments)   AAD  -      Gait Training Goal PT STG, Distance to Achieve 50  -EZ      Gait Training Goal PT STG, Additional Goal O2 sats will not drop below 92%  -      Gait Training Goal PT STG, Outcome goal ongoing  -      Gait Training PT LTG    Gait Training Goal PT LTG, Date Established 02/28/17  -      Gait Training Goal PT LTG, Time to Achieve by discharge  -      Gait Training Goal PT LTG, [place] Level conditional independence  -EZ      Gait Training Goal PT LTG, Assist Device other (see comments)   AAD  -      Gait Training Goal PT LTG, Distance to Achieve 150  -EZ      Gait Training Goal PT LTG, Outcome goal ongoing  -      Stair Training PT LTG    Stair Training Goal PT LTG, Date Established 02/28/17  -      Stair Training Goal PT LTG, Time to Achieve by discharge  -      Stair Training Goal PT LTG, Number of Steps 3  -      Stair Training Goal PT LTG, [place] Level conditional independence  -      Stair Training Goal PT LTG, Assist Device 2 handrails;cane, quad  -      Stair Training Goal PT LTG, Outcome goal ongoing  -      Strength Goal PT LTG    Strength Goal PT LTG, Date Established 02/28/17  -      Strength Goal PT LTG, Time to Achieve by discharge  -      Strength Goal PT LTG, Measure to Achieve Pt will perform 2 sets of 15 reps or AROM exercises  -      Strength Goal PT LTG, Functional Goal Pt will progress to standing exercises  -      Strength Goal PT LTG, Outcome goal ongoing  -      Patient Education PT LTG    Patient Education PT LTG, Date Established 02/28/17  -      Patient Education PT LTG, Time to Achieve by discharge  -      Patient Education PT LTG, Education Type written  program;HEP;posture/body mechanics;benefits of activity;home safety  -EZ      Patient Education PT LTG Outcome goal ongoing  -EZ        User Key  (r) = Recorded By, (t) = Taken By, (c) = Cosigned By    Initials Name Provider Type    EZ Stone, PT Physical Therapist    CITLALLI River, GERI Physical Therapy Assistant    TW Raoul Devine, GERI Physical Therapy Assistant    MP Greg Robison, PT Physical Therapist          Physical Therapy Education     Title: PT OT SLP Therapies (Active)     Topic: Physical Therapy (Active)     Point: Mobility training (Active)    Learning Progress Summary    Learner Readiness Method Response Comment Documented by Status   Patient Acceptance E NR  LN 03/03/17 1449 Active    Acceptance E VU  MP 03/02/17 1128 Done               Point: Home exercise program (Active)    Learning Progress Summary    Learner Readiness Method Response Comment Documented by Status   Patient Acceptance E NR  LN 03/03/17 1449 Active               Point: Precautions (Active)    Learning Progress Summary    Learner Readiness Method Response Comment Documented by Status   Patient Acceptance E NR  LN 03/03/17 1449 Active    Acceptance E VU  MP 03/02/17 1128 Done                      User Key     Initials Effective Dates Name Provider Type Discipline     10/17/16 -  Harriet River, GERI Physical Therapy Assistant PT     10/17/16 -  Greg Robison, PT Physical Therapist PT                    PT Recommendation and Plan  Planned Therapy Interventions: balance training, bed mobility training, gait training, home exercise program, patient/family education, ROM (Range of Motion), stair training, strengthening, transfer training  PT Frequency: per priority policy (5-14 times per week)  Plan of Care Review  Plan Of Care Reviewed With: patient  Progress: progress toward functional goals as expected  Outcome Summary/Follow up Plan: declined oob/eob due to soa,sats above 90% with ex-would benefit from tcu/snf  prior to d/c home          Outcome Measures       03/03/17 1330 03/03/17 0905 03/02/17 1100    How much help from another person do you currently need...    Turning from your back to your side while in flat bed without using bedrails? 4  -LN  4  -MP    Moving from lying on back to sitting on the side of a flat bed without bedrails? 4  -LN  4  -MP    Moving to and from a bed to a chair (including a wheelchair)? 3  -LN  3  -MP    Standing up from a chair using your arms (e.g., wheelchair, bedside chair)? 3  -LN  3  -MP    Climbing 3-5 steps with a railing? 3  -LN  3  -MP    To walk in hospital room? 3  -LN  3  -MP    AM-PAC 6 Clicks Score 20  -LN  20  -MP    How much help from another is currently needed...    Putting on and taking off regular lower body clothing?  2  -KD     Bathing (including washing, rinsing, and drying)  2  -KD     Toileting (which includes using toilet bed pan or urinal)  2  -KD     Putting on and taking off regular upper body clothing  2  -KD     Taking care of personal grooming (such as brushing teeth)  3  -KD     Eating meals  3  -KD     Score  14  -KD     Functional Assessment    Outcome Measure Options AM-PAC 6 Clicks Basic Mobility (PT)  -LN  AM-PAC 6 Clicks Basic Mobility (PT)  -MP      03/02/17 0846 03/01/17 1310 03/01/17 0927    How much help from another person do you currently need...    Turning from your back to your side while in flat bed without using bedrails?   4  -TW    Moving from lying on back to sitting on the side of a flat bed without bedrails?   3  -TW    Moving to and from a bed to a chair (including a wheelchair)?   3  -TW    Standing up from a chair using your arms (e.g., wheelchair, bedside chair)?   3  -TW    Climbing 3-5 steps with a railing?   3  -TW    To walk in hospital room?   3  -TW    AM-PAC 6 Clicks Score   19  -TW    How much help from another is currently needed...    Putting on and taking off regular lower body clothing? 2  -BL 2  -JH     Bathing (including  washing, rinsing, and drying) 2  -BL 2  -JH     Toileting (which includes using toilet bed pan or urinal) 2  -BL 2  -JH     Putting on and taking off regular upper body clothing 2  -BL 2  -JH     Taking care of personal grooming (such as brushing teeth) 3  -BL 2  -JH     Eating meals 3  -BL 3  -JH     Score 14  -BL 13  -JH     Functional Assessment    Outcome Measure Options AM-PAC 6 Clicks Daily Activity (OT)  -BL  AM-PAC 6 Clicks Basic Mobility (PT)  -      02/28/17 1500          How much help from another is currently needed...    Putting on and taking off regular lower body clothing? 2  -BH      Bathing (including washing, rinsing, and drying) 2  -BH      Toileting (which includes using toilet bed pan or urinal) 2  -BH      Putting on and taking off regular upper body clothing 3  -BH      Taking care of personal grooming (such as brushing teeth) 3  -BH      Eating meals 4  -      Score 16  -      Functional Assessment    Outcome Measure Options AM-PAC 6 Clicks Daily Activity (OT)  -        User Key  (r) = Recorded By, (t) = Taken By, (c) = Cosigned By    Initials Name Provider Type     Pina Almeida, OTR/L Occupational Therapist    CITLALLI River PTA Physical Therapy Assistant     Raoul Devine PTA Physical Therapy Assistant    KD Mervat Valentine, SEVERINO/L Occupational Therapy Assistant     Montserrat Patel SEVERINO/L Occupational Therapy Assistant     Sandra Davison, SEVERINO/L Occupational Therapy Assistant    MP Greg Robison, PT Physical Therapist           Time Calculation:         PT Charges       03/03/17 1330          Time Calculation    Start Time 1330  -LN      Stop Time 1400  -LN      Time Calculation (min) 30 min  -LN      PT Received On 03/03/17  -LN      Time Calculation- PT    Total Timed Code Minutes- PT 30 minute(s)  -LN        User Key  (r) = Recorded By, (t) = Taken By, (c) = Cosigned By    Initials Name Provider Type    CITLALLI River, GERI Physical Therapy Assistant           Therapy Charges for Today     Code Description Service Date Service Provider Modifiers Qty    64546261792  PT THER PROC EA 15 MIN 3/3/2017 Harriet River, PTA GP 1    23025690646 HC PT THERAPEUTIC ACT EA 15 MIN 3/3/2017 Harriet River PTA GP 1          PT G-Codes  PT Professional Judgement Used?: Yes  Outcome Measure Options: AM-PAC 6 Clicks Basic Mobility (PT)  Score: 19  Functional Limitation: Mobility: Walking and moving around  Mobility: Walking and Moving Around Current Status (): At least 1 percent but less than 20 percent impaired, limited or restricted    Harriet River PTA  3/3/2017

## 2017-03-03 NOTE — PROGRESS NOTES
"   LOS: 10 days    Patient Care Team:  Klaus Faria MD as PCP - General    Subjective:  The patient states he is doing better no marked soa this morning.    Review of Systems:   Review of Systems   All other systems reviewed and are negative.      Objective    Lungs few crackles are heard at the bases.  Heart regular rate and rhythm with systolic murmur at the left sternal border.  Abdomen soft and tender distended no hepatojugular reflux.   Extremities 2+ pitting edema bilaterally    Vital Signs  Temp:  [96.2 °F (35.7 °C)-98.1 °F (36.7 °C)] 98.1 °F (36.7 °C)  Heart Rate:  [] 75  Resp:  [20-30] 20  BP: (150-175)/(54-81) 175/81    Flowsheet Rows         First Filed Value    Admission Height  66\" (167.6 cm) Documented at 02/21/2017 1119    Admission Weight  160 lb (72.6 kg) Documented at 02/21/2017 1119             I/O last 3 completed shifts:  In: 580 [P.O.:580]  Out: 875 [Urine:875]    Intake/Output Summary (Last 24 hours) at 03/03/17 0912  Last data filed at 03/03/17 0513   Gross per 24 hour   Intake    180 ml   Output    575 ml   Net   -395 ml       Physical Exam:  Physical Exam   Constitutional: He appears well-developed.   Eyes: Pupils are equal, round, and reactive to light.   Cardiovascular: Normal rate and regular rhythm.    Pulmonary/Chest: Effort normal. He has rales.   Abdominal: Soft. Bowel sounds are normal.        Results Review:      Results from last 7 days  Lab Units 03/03/17  0652 03/02/17  0527 03/01/17  0734   SODIUM mmol/L 134* 134* 136*   POTASSIUM mmol/L 3.9 3.5 3.6   CHLORIDE mmol/L 99 99 99   TOTAL CO2 mmol/L 26.0 27.0 26.0   BUN mg/dL 92* 88* 85*   CREATININE mg/dL 2.07* 2.10* 2.06*   CALCIUM mg/dL 8.8 9.0 9.0   GLUCOSE mg/dL 192* 220* 136*       Estimated Creatinine Clearance: 29.8 mL/min (by C-G formula based on Cr of 2.07).      Results from last 7 days  Lab Units 03/03/17  0652 03/02/17  0527 03/01/17  0734  02/25/17  0720   MAGNESIUM mg/dL 2.6* 2.7* 2.6*  < >  --    PHOSPHORUS " mg/dL  --   --   --   --  4.9*   < > = values in this interval not displayed.            Results from last 7 days  Lab Units 03/03/17  0652 03/02/17  0527 03/01/17  0634 02/28/17  0544 02/27/17  0524   WBC 10*3/mm3 9.73 8.96 9.99* 7.05 7.33   HEMOGLOBIN g/dL 12.0* 12.4* 12.5* 12.0* 12.1*   PLATELETS 10*3/mm3 193 201 207 188 192         Results from last 7 days  Lab Units 03/03/17  0652 03/02/17  0527 03/01/17  1234 02/28/17  0544 02/27/17  0524   INR  3.37* 4.08* 2.87* 1.50* 1.78*         Imaging Results (last 24 hours)     ** No results found for the last 24 hours. **            Medication Review:   Current Facility-Administered Medications   Medication Dose Route Frequency Provider Last Rate Last Dose   • acetaminophen (TYLENOL) tablet 650 mg  650 mg Oral Q6H PRN Adriana Wells MD   650 mg at 02/28/17 1450   • albuterol (PROVENTIL HFA;VENTOLIN HFA) inhaler 2 puff  2 puff Inhalation Q4H PRN Adriana Wells MD       • albuterol (PROVENTIL) nebulizer solution 0.083% 2.5 mg/3mL  2.5 mg Nebulization Q4H PRN LONNIE Leon       • atorvastatin (LIPITOR) tablet 20 mg  20 mg Oral Daily Adriana Wells MD   20 mg at 03/03/17 0858   • budesonide (PULMICORT) nebulizer solution 0.5 mg  0.5 mg Nebulization BID - RT Zackary Anand MD   0.5 mg at 03/03/17 0734   • cholecalciferol (VITAMIN D3) tablet 1,000 Units  1,000 Units Oral Daily Adriana Wells MD   1,000 Units at 03/03/17 0858   • clopidogrel (PLAVIX) tablet 75 mg  75 mg Oral Daily Adriana Wells MD   75 mg at 03/03/17 0858   • dextrose (D50W) solution 25 g  25 g Intravenous Q15 Min PRN Adriana Wells MD       • dextrose (D50W) solution 50 mL  50 mL Intravenous Q1H PRN Stephani Bingham MD   50 mL at 02/22/17 0645   • dextrose (GLUTOSE) oral gel 15 g  15 g Oral Q15 Min PRN Adriana Wells MD       • diltiaZEM CD (CARDIZEM CD) 24 hr capsule 240 mg  240 mg Oral Daily Adriana Wells MD   240 mg at 03/03/17 0858   •  escitalopram (LEXAPRO) tablet 10 mg  10 mg Oral Daily Adriana Wells MD   10 mg at 03/03/17 0857   • furosemide (LASIX) tablet 40 mg  40 mg Oral BID Richi Hayward MD   40 mg at 03/03/17 0858   • glipiZIDE (GLUCOTROL) 24 hr tablet 2.5 mg  2.5 mg Oral Nightly Adriana Wells MD   2.5 mg at 03/02/17 2203   • glucagon (human recombinant) (GLUCAGEN DIAGNOSTIC) injection 1 mg  1 mg Subcutaneous Q15 Min PRN Adriana Wells MD       • hydrALAZINE (APRESOLINE) tablet 100 mg  100 mg Oral Q8H Richi Hayward MD   100 mg at 03/03/17 0512   • insulin aspart (novoLOG) injection 0-9 Units  0-9 Units Subcutaneous 4x Daily AC & at Bedtime Adriana Wells MD   2 Units at 03/03/17 0901   • insulin detemir (LEVEMIR) injection 15 Units  15 Units Subcutaneous Nightly Zackary Anand MD   15 Units at 03/02/17 2203   • ipratropium (ATROVENT) nebulizer solution 0.5 mg  0.5 mg Nebulization 4x Daily - RT Zackary Anand MD   0.5 mg at 03/03/17 0727   • isosorbide mononitrate (IMDUR) 24 hr tablet 60 mg  60 mg Oral Q24H Zackary Anand MD   60 mg at 03/03/17 0858   • LORazepam (ATIVAN) injection 0.5 mg  0.5 mg Intravenous Q4H PRN Jimbo Shelley MD   0.5 mg at 03/02/17 2037   • methylPREDNISolone sodium succinate (SOLU-Medrol) injection 40 mg  40 mg Intravenous Q12H Adriana Wells MD   40 mg at 03/03/17 0430   • pantoprazole (PROTONIX) EC tablet 40 mg  40 mg Oral Q AM Fabiana Wells MD   40 mg at 03/03/17 0512   • Pharmacy to dose warfarin   Does not apply Continuous PRN Zackary Anand MD       • phenol (CHLORASEPTIC) 1.4 % liquid 2 spray  2 spray Mouth/Throat Q2H PRN Fabiana Wells MD       • sodium chloride 0.9 % flush 10 mL  10 mL Intravenous PRN David Henderson MD   10 mL at 03/01/17 1398       Assessment/Plan    1 chronic kidney disease stage III with renal function at baseline. Has high bun than cr sec to steroid +/- lasix  2 congestive heart failure with significant volume  overload. 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 then keep 40mg bid   2. Continue with fluid and salt restriction.  3. ct hydralazine to 100mg tid    Principal Problem:    CHF (congestive heart failure)  Active Problems:    Nonrheumatic aortic valve stenosis    Persistent atrial fibrillation    Congestive heart failure    Aortic valve stenosis    Dyslipidemia    Essential hypertension    Kidney insufficiency    History of cerebrovascular accident    Tobacco dependence syndrome              Richi Hayward MD  03/03/17  9:12 AM

## 2017-03-04 NOTE — PLAN OF CARE
Problem: Patient Care Overview (Adult)  Goal: Plan of Care Review  Outcome: Ongoing (interventions implemented as appropriate)    03/03/17 1330 03/03/17 2010   Coping/Psychosocial Response Interventions   Plan Of Care Reviewed With --  patient   Patient Care Overview   Progress progress toward functional goals as expected --    Outcome Evaluation   Outcome Summary/Follow up Plan declined oob/eob due to soa,sats above 90% with ex-would benefit from tcu/snf prior to d/c home --        Goal: Adult Individualization and Mutuality  Outcome: Ongoing (interventions implemented as appropriate)  Goal: Discharge Needs Assessment  Outcome: Ongoing (interventions implemented as appropriate)    Problem: Cardiac: Heart Failure (Adult)  Goal: Signs and Symptoms of Listed Potential Problems Will be Absent or Manageable (Cardiac: Heart Failure)  Outcome: Ongoing (interventions implemented as appropriate)

## 2017-03-04 NOTE — PROGRESS NOTES
";Anticoagulation by Pharmacy - Warfarin    Kevin Henning is a 76 y.o.male  [Ht: 65.98\" (167.6 cm); Wt: 177 lb 3.2 oz (80.4 kg)] on Warfarin 4 mg PO  for indication of A. fib.    Goal INR: 2-3  Today's INR:   Lab Results   Component Value Date    INR 2.80 (H) 03/04/2017         Lab Results   Component Value Date    INR 2.80 (H) 03/04/2017    INR 3.37 (H) 03/03/2017    INR 4.08 (H) 03/02/2017    PROTIME 28.6 (H) 03/04/2017    PROTIME 32.9 (H) 03/03/2017    PROTIME 38.0 (H) 03/02/2017     Lab Results   Component Value Date    HGB 11.5 (L) 03/04/2017    HGB 12.0 (L) 03/03/2017    HGB 12.4 (L) 03/02/2017     Lab Results   Component Value Date    HCT 34.1 (L) 03/04/2017    HCT 34.8 (L) 03/03/2017    HCT 36.1 (L) 03/02/2017     Assessment/Plan:  INR is now therapeutic.  Will restart warfarin at 4 mg PO QHS.  Will follow.    Enrique Terry III, [facility]  03/04/17 11:17 AM    "

## 2017-03-04 NOTE — PROGRESS NOTES
Acute Care - Occupational Therapy Treatment Note  [facility]     Patient Name: Kevin Henning  : 1940  MRN: [id number]  Today's Date: 3/4/2017  Onset of Illness/Injury or Date of Surgery Date: 17  Date of Referral to OT: 17  Referring Physician: Dr. STEW Wells      Admit Date: 2017    Visit Dx:     ICD-10-CM ICD-9-CM   1. Congestive heart failure, unspecified congestive heart failure chronicity, unspecified congestive heart failure type I50.9 428.0   2. Impaired physical mobility Z74.09 781.99   3. Impaired mobility and ADLs Z74.09 799.89   4. Impaired gait and mobility R26.89 781.2     Patient Active Problem List   Diagnosis   • CKD stage 3 due to type 2 diabetes mellitus   • DM II (diabetes mellitus, type II), controlled   • History of CVA in adulthood   • PVD (peripheral vascular disease)   • Pleural effusion   • History of atrial fibrillation   • Benign essential HTN   • Bradycardia   • Nonrheumatic aortic valve stenosis   • Dyspnea on exertion   • Persistent atrial fibrillation   • Congestive heart failure   • Aortic valve stenosis   • CHF (congestive heart failure)   • Dyslipidemia   • Essential hypertension   • Kidney insufficiency   • History of cerebrovascular accident   • Tobacco dependence syndrome             Adult Rehabilitation Note       17 1030 17 1330 17 0905    Rehab Assessment/Intervention    Discipline occupational therapy assistant  -KD physical therapy assistant  -LN occupational therapy assistant  -KD    Document Type therapy note (daily note)  -KD therapy note (daily note)  -LN therapy note (daily note)  -KD    Subjective Information agree to therapy  -KD complains of;agree to therapy;dyspnea;fatigue   agreed to ther ex in supine,declined eob/oob  -LN agree to therapy  -KD    Symptoms Noted During/After Treatment shortness of breath  -KD  shortness of breath  -KD    Precautions/Limitations fall precautions  -KD fall precautions  -LN fall  precautions  -KD    Recorded by [KD] LILIAN MacdonaldA/L [LN] Harriet River, PTA [KD] LILIAN MacdonaldA/L    Vital Signs    Pre Systolic BP Rehab 203  -  -  -KD    Pre Treatment Diastolic BP 83   nsg aware of BP  -KD 62  -LN 65  -KD    Post Systolic BP Rehab  150  -LN     Post Treatment Diastolic BP  60  -LN     Pretreatment Heart Rate (beats/min) 103  -KD 82  -LN 69  -KD    Posttreatment Heart Rate (beats/min) 90  -KD 80  -LN 70  -KD    Pre SpO2 (%) 95  -KD 96  -LN 97  -KD    O2 Delivery Pre Treatment supplemental O2  -KD supplemental O2  -LN supplemental O2  -KD    Intra SpO2 (%)  91  -LN     Post SpO2 (%) 94  -KD 94  -LN 92  -KD    O2 Delivery Post Treatment supplemental O2  -KD  supplemental O2  -KD    Pre Patient Position Sitting  -KD Supine  -LN Supine  -KD    Intra Patient Position Standing  -KD Supine  -LN Sitting  -KD    Post Patient Position Sitting  -KD Supine  -LN Sitting  -KD    Rest Breaks  --   PRN  -KD --   as needed  -LN --   PRN  -KD    Recorded by [KD] LILIAN MacdonaldA/L [LN] Harriet River, PTA [KD] LILIAN MacdonaldA/L    Pain Assessment    Pain Assessment No/denies pain  -KD 0-10  -LN No/denies pain   Just soreness  -KD    Pain Score 0  -KD 0  -LN     Post Pain Score 0  -KD 0  -LN     Recorded by [KD] LILIAN MacdonaldA/L [LN] Harriet River, PTA [KD] LILIAN MacdonaldA/L    Cognitive Assessment/Intervention    Current Cognitive/Communication Assessment functional  -KD  functional  -KD    Orientation Status oriented x 4  -KD oriented x 4  -LN oriented x 4  -KD    Follows Commands/Answers Questions 100% of the time  -KD  100% of the time  -KD    Personal Safety Interventions gait belt;nonskid shoes/slippers when out of bed  -KD  gait belt;nonskid shoes/slippers when out of bed  -KD    Recorded by [KD] LILIAN MacdonaldA/L [LN] Harriet River, PTA [KD] LILIAN MacdonaldA/L    Bed Mobility, Assessment/Treatment    Bed Mobility, Assistive Device   bed rails;head of bed  elevated  -KD    Bed Mobility, Roll Left, [place]   conditional independence  -KD    Bed Mob, Supine to Sit, [place]   conditional independence  -KD    Bed Mob, Sit to Supine, [place]   not tested  -KD    Bed Mob, Sidelying to Sit, [place]   conditional independence  -KD    Bed Mob, Sit to Sidelying, [place]   not tested  -KD    Bed Mobility, Impairments   strength decreased   SOA  -KD    Recorded by   [KD] MAYCOL Macdonald/L    Transfer Assessment/Treatment    Transfers, Bed-Chair [place]   contact guard assist  -KD    Transfers, Chair-Bed [place]   not tested  -KD    Transfers, Bed-Chair-Bed, Assist Device   rolling walker   gait belt  -KD    Transfers, Sit-Stand [place] contact guard assist  -KD  contact guard assist  -KD    Transfers, Stand-Sit [place] contact guard assist  -KD  contact guard assist  -KD    Transfers, Sit-Stand-Sit, Assist Device rolling walker  -KD  rolling walker  -KD    Toilet Transfer, [place] not tested  -KD  not tested  -KD    Walk-In Shower Transfer, [place] not tested  -KD  not tested  -KD    Bathtub Transfer, [place] not tested  -KD  not tested  -KD    Recorded by [KD] MAYCOL Macdonald/L  [KD] MAYCOL Macdonald/L    Functional Mobility    Functional Mobility- Ind. Level   contact guard assist  -KD    Functional Mobility- Device   rolling walker  -KD    Functional Mobility-Distance (Feet)   3  -KD    Functional Mobility- Safety Issues   supplemental O2  -KD    Recorded by   [KD] MAYCOL Macdonald/L    Upper Body Bathing Assessment/Training    UB Bathing Assess/Train Assistive Device bath mitt  -KD  bath mitt  -KD    UB Bathing Assess/Train, Position sitting  -KD  sitting;edge of bed  -KD    UB Bathing Assess/Train, [place] Level supervision required  -KD  contact guard assist  -KD    UB Bathing Assess/Train, Impairments strength decreased   SOA  -KD  strength decreased   SOA  -KD    Recorded by [KD] Mervat OSWALD  MAYCOL Valentine/MAYELIN  [KD] LILIAN MacdonaldA/L    Lower Body Bathing Assessment/Training    LB Bathing Assess/Train Assistive Device bath mitt  -KD  bath mitt  -KD    LB Bathing Assess/Train, Position sitting  -KD  edge of bed;sitting  -KD    LB Bathing Assess/Train, [place] Level minimum assist (75% patient effort)  -KD  moderate assist (50% patient effort)  -KD    LB Bathing Assess/Train, Impairments strength decreased   SOA  -KD  strength decreased   SOA  -KD    Recorded by [KD] MAYCOL Macdonald/MAYELIN  [KD] LILIAN MacdonaldA/L    Upper Body Dressing Assessment/Training    UB Dressing Assess/Train, Clothing Type doffing:;donning:;hospital gown;button up  -KD  doffing:;donning:;hospital gown  -KD    UB Dressing Assess/Train, Position sitting  -KD  edge of bed;sitting  -KD    UB Dressing Assess/Train, [place] minimum assist (75% patient effort);moderate assist (50% patient effort)  -KD  minimum assist (75% patient effort)  -KD    UB Dressing Assess/Train, Impairments strength decreased   SOA  -KD  strength decreased   SOA  -KD    Recorded by [KD] MAYCOL Macdonald/MAYELIN  [KD] LILIAN MacdonaldA/L    Lower Body Dressing Assessment/Training    LB Dressing Assess/Train, Clothing Type doffing:;donning:;slipper socks  -KD  doffing:;donning:;slipper socks  -KD    LB Dressing Assess/Train, Position sitting  -KD  edge of bed;sitting  -KD    LB Dressing Assess/Train, [place] maximum assist (25% patient effort)  -KD  maximum assist (25% patient effort)  -KD    LB Dressing Assess/Train, Impairments strength decreased   SOA  -KD  strength decreased   SOA  -KD    Recorded by [KD] MAYCOL Macdonald/L  [KD] LILIAN MacdonaldA/L    Toileting Assessment/Training    Toileting Assess/Train, Assistive Device   urinal  -KD    Toileting Assess/Train, Position   sitting   BSChair  -KD    Toileting Assess/Train, Indepen Level   set up required  -KD    Toileting Assess/Train, Impairments   strength decreased  -KD     Recorded by   [KD] MAYCOL Macdonald/L    Grooming Assessment/Training    Grooming Assess/Train, Assistive Device --   wash face  -KD  --   wash mitt  -KD    Grooming Assess/Train, Position sitting  -KD  sitting  -KD    Grooming Assess/Train, Indepen Level set up required  -KD  set up required  -KD    Grooming Assess/Train, Impairments strength decreased   SOA  -KD  strength decreased  -KD    Grooming Assess/Train, Comment   Pt needed RB's PRN to complete ADL task.  -KD    Recorded by [KD] MAYCOL Macdonald/MAYELIN  [KD] MAYCOL Macdonald/L    Balance Skills Training    Sitting-Level of Assistance Contact guard  -KD  Close supervision  -KD    Sitting-Balance Support Feet supported;No upper extremity supported  -KD  --   In BSChair 2' SOA sitting EOB w/o support  -KD    Sitting-Balance Activities --   ADL  -KD  --   ADL  -KD    Sitting # of Minutes 45  -KD  49  -KD    Standing-Level of Assistance Contact guard  -KD      Standing Balance # of Minutes 3  -KD      Recorded by [KD] MAYCOL Macdonald/MAYELIN  [KD] MAYCOL Macdonald/L    Therapy Exercises    Bilateral Lower Extremities  AROM:;10 reps;supine;hip abduction/adduction;heel slides;SAQ   20 reps ankle pumps  -LN     Recorded by  [LN] Harriet River PTA     Positioning and Restraints    Pre-Treatment Position sitting in chair/recliner  -KD  in bed  -KD    Post Treatment Position chair  -KD bed  -LN chair  -KD    In Bed  supine;call light within reach;encouraged to call for assist;exit alarm on  -LN     In Chair reclined;call light within reach;encouraged to call for assist;exit alarm on  -KD  reclined;sitting;call light within reach;encouraged to call for assist;exit alarm on;notified nsg  -KD    Recorded by [KD] MAYCOL Macdonald/MAYELIN [LN] Harriet River PTA [KD] MAYCOL Macdonald/MAYELIN      03/02/17 0846 03/01/17 1310       Rehab Assessment/Intervention    Discipline occupational therapy assistant  -BL occupational therapy assistant  -     Document Type  therapy note (daily note)  -BL therapy note (daily note)  -     Subjective Information agree to therapy;complains of;weakness   shortness of breath  -BL agree to therapy;complains of;weakness;fatigue;pain;dyspnea  -     Symptoms Noted During/After Treatment fatigue;shortness of breath  -BL fatigue  -     Precautions/Limitations fall precautions  -BL      Recorded by [BL] OBED Vieira [JH] MAYCOL Banks/L     Vital Signs    Pre Systolic BP Rehab 148  -BL      Pre Treatment Diastolic BP 62  -BL      Pretreatment Heart Rate (beats/min) 61  -BL      Posttreatment Heart Rate (beats/min) 69  -BL      Pre SpO2 (%) 96  -BL 96  -JH     O2 Delivery Pre Treatment supplemental O2  -BL supplemental O2  -JH     Intra SpO2 (%)  94  -JH     O2 Delivery Intra Treatment  supplemental O2  -JH     Post SpO2 (%) 94  -BL 94  -JH     O2 Delivery Post Treatment supplemental O2  -BL supplemental O2  -JH     Pre Patient Position Supine  -BL      Intra Patient Position Sitting  -BL      Post Patient Position Sitting  -BL      Recorded by [BL] OBED Vieira [] MAYCOL Banks/L     Pain Assessment    Pain Assessment No/denies pain  -BL      Recorded by [BL] MAYCOL Vieira/L      Cognitive Assessment/Intervention    Current Cognitive/Communication Assessment functional  -BL functional  -     Orientation Status oriented x 4  -BL oriented x 4  -JH     Follows Commands/Answers Questions 100% of the time  -% of the time  -JH     Recorded by [BL] OBED Vieira [JH] MAYCOL Banks/L     Bed Mobility, Assessment/Treatment    Bed Mobility, Roll Left, [place]  independent  -JH     Bed Mobility, Roll Right, [place]  independent  -JH     Bed Mob, Supine to Sit, [place] supervision required  -BL      Bed Mob, Sit to Supine, [place] supervision required  -BL      Recorded by [BL] OBED Vieira [JH] MAYCOL Banks/L     Transfer Assessment/Treatment    Transfer,  Comment  Pt decline fxnl transfer   -JH     Recorded by  [JH] OBED Banks     Functional Mobility    Functional Mobility- Comment  Ptdecline to get up oob  -JH     Recorded by  [JH] OBED Banks     Toileting Assessment/Training    Toileting Assess/Train, Assistive Device urinal  -BL      Toileting Assess/Train, Position supine;sitting  -BL      Toileting Assess/Train, [place]ep[place] Level set up required  -BL      Toileting Assess/Train, Impairments strength decreased  -BL      Toileting Assess/Train, Comment No OOB completed this date secondary to pt's O2 saturation decreasing very easily. Pt has moderate difficulty improving O2 with rest breaks.  -BL      Recorded by [BL] OBED Vieira      Grooming Assessment/Training    Grooming Assess/Train, Assistive Device --   Wash mitt  -BL      Grooming Assess/Train, Position sitting  -BL      Grooming Assess/Train, [place]ep[place] Level set up required  -BL      Grooming Assess/Train, Impairments strength decreased   decreased O2/endurance  -BL      Grooming Assess/Train, Comment Pt demo's decreased endurance and activity tolerance for grooming task this date requiring multiple rest breaks to fully complete task.  -BL      Recorded by [BL] OBED Vieira      Therapy Exercises    Bilateral Upper Extremity AROM:;15 reps;sitting;elbow flexion/extension;hand pumps;pronation/supination;shoulder extension/flexion;shoulder horizontal abd/add  -BL AROM:;10 reps;15 reps;supine;sitting;elbow flexion/extension;pronation/supination;shoulder abduction/adduction;shoulder extension/flexion  -     BUE Resistance --   2 pound weight as requested by patient   -BL manual resistance- minimal  -JH     Recorded by [BL] OBED Vieira [JH] OBED Banks     Positioning and Restraints    Pre-Treatment Position in bed  -BL in bed  -JH     Post Treatment Position bed  -BL bed  -JH     In Bed notified nsg;supine;call light within reach;encouraged to call for  assist;exit alarm on  -BL supine;call light within reach;encouraged to call for assist  -     Recorded by [BL] LILIAN VieiraA/MAYELIN [] Montserrat Patel SEVERINO/L       User Key  (r) = Recorded By, (t) = Taken By, (c) = Cosigned By    Initials Name Effective Dates    LN Harriet River, PTA 10/17/16 -     KD Mervat Valentine, SEVERINO/L 10/17/16 -      Montserrat Patel SEVERINO/L 10/17/16 -     BL Sandra Davison SEVERINO/MAYELIN 10/17/16 -                 OT Goals       03/04/17 1030 03/03/17 0905 03/02/17 0913    Patient Education OT LTG    Patient Education OT LTG, Date Goal Reviewed 03/04/17  -KD 03/03/17  -KD 03/02/17  -    Patient Education OT LTG Outcome goal not met  -      Patient Education OT LTG, Reason Goal Not Met unable to make needed progress  -KD      ADL OT LTG    ADL OT LTG, Date Goal Reviewed 03/04/17  -KD 03/03/17  -KD 03/02/17  -    ADL OT LTG, Outcome goal not met  -KD      ADL OT LTG, Reason Goal Not Met unable to make needed progress  -KD      Activity Tolerance OT STG    Activity Tolerance Goal OT STG, Date Goal Reviewed 03/04/17  -KD 03/03/17  -KD 03/02/17  -BL    Activity Tolerance Goal OT STG, Outcome goal not met  -KD      Activity Tolerance Goal OT STG, Reason Goal Not Met unable to make needed progress  -KD      Endurance OT LTG    Endurance Goal OT LTG, Date Goal Reviewed 03/04/17  -KD 03/03/17  -KD 03/02/17  -BL    Endurance Goal OT LTG, Outcome goal not met  -KD      Endurance Goal OT LTG, Reason Goal Not Met unable to make needed progress  -KD        03/01/17 1310 02/28/17 1500       Patient Education OT LTG    Patient Education OT LTG, Date Established  02/28/17  -     Patient Education OT LTG, Time to Achieve  by discharge  -     Patient Education OT LTG, Education Type  HEP;home safety  -     Patient Education OT LTG, Education Understanding  verbalizes understanding;demonstrates adequately;independent  -     Patient Education OT LTG, Date Goal Reviewed 03/01/17  -      Patient  Education OT LTG Outcome goal ongoing  -      Patient Education OT LTG, Reason Goal Not Met unable to make needed progress  -      ADL OT LTG    ADL OT LTG, Date Established  02/28/17  -     ADL OT LTG, Time to Achieve  by discharge  -     ADL OT LTG, Activity Type  ADL skills  -     ADL OT LTG, [place] Level  min verbal cues;standby assist   AE as needed  -     ADL OT LTG, Date Goal Reviewed 03/01/17  -      ADL OT LTG, Outcome goal ongoing  -      ADL OT LTG, Reason Goal Not Met unable to make needed progress  -      Activity Tolerance OT STG    Activity Tolerance Goal OT STG, Date Established  02/28/17  -     Activity Tolerance Goal OT STG, Time to Achieve  1 wk  -     Activity Tolerance Goal OT STG, Activity Level  10 min activity;O2 sat >/equal to 90%;with 2 rest breaks  -     Activity Tolerance Goal OT STG, Date Goal Reviewed 03/01/17  -      Activity Tolerance Goal OT STG, Outcome goal ongoing  -      Activity Tolerance Goal OT STG, Reason Goal Not Met unable to make needed progress  -      Endurance OT LTG    Endurance Goal OT LTG, Date Established  02/28/17  -     Endurance Goal OT LTG, Time to Achieve  by discharge  -     Endurance Goal OT LTG, Activity Level  endurance 2 good-   15 minute act  -     Endurance Goal OT LTG, Date Goal Reviewed 03/01/17  -      Endurance Goal OT LTG, Outcome goal ongoing  -      Endurance Goal OT LTG, Reason Goal Not Met unable to make needed progress  -        User Key  (r) = Recorded By, (t) = Taken By, (c) = Cosigned By    Initials Name Provider Type     Pina Almeida, OTR/L Occupational Therapist    DWAIN Valentine, SEVERINO/L Occupational Therapy Assistant     Montserrat Patel SEVERINO/L Occupational Therapy Assistant     Sandra Davison, ESVERINO/L Occupational Therapy Assistant          Occupational Therapy Education     Title: PT OT SLP Therapies (Active)     Topic: Occupational Therapy (Active)     Point: ADL training (Active)     Description: Instruct learner(s) on proper safety adaptation and remediation techniques during self care or transfers.   Instruct in proper use of assistive devices.    Learning Progress Summary    Learner Readiness Method Response Comment Documented by Status   Patient Acceptance TB NR   03/04/17 1144 Active    Acceptance E NR   03/03/17 1144 Active    Acceptance E VU   03/01/17 1429 Done               Point: Home exercise program (Active)    Description: Instruct learner(s) on appropriate technique for monitoring, assisting and/or progressing therapeutic exercises/activities.    Learning Progress Summary    Learner Readiness Method Response Comment Documented by Status   Patient Acceptance E NR   03/03/17 1144 Active    Eager E,D VU PLB and EC/WS techniques educated on today verbalizing understanding.  03/02/17 0912 Done    Acceptance E [facility] 03/01/17 1429 Done               Point: Precautions (Active)    Description: Instruct learner(s) on prescribed precautions during self-care and functional transfers.    Learning Progress Summary    Learner Readiness Method Response Comment Documented by Status   Patient Acceptance E NR   03/03/17 1144 Active    Eager E,D VU PLB and EC/WS techniques educated on today verbalizing understanding.  03/02/17 0912 Done    Acceptance E VU   03/01/17 1429 Done    Acceptance E VU,NR Pt educated to call and not get up on his own. Pt educated on safety with t/f and during eval. Pt educated about OT and POC.  02/28/17 1547 Done               Point: Body mechanics (Active)    Description: Instruct learner(s) on proper positioning and spine alignment during self-care, functional mobility activities and/or exercises.    Learning Progress Summary    Learner Readiness Method Response Comment Documented by Status   Patient Acceptance E NR   03/03/17 1144 Active    Eager E,D VU PLB and EC/WS techniques educated on today verbalizing understanding.  03/02/17 0912 Done     Acceptance E VU   03/01/17 1429 Done    Acceptance E VU,NR Pt educated to call and not get up on his own. Pt educated on safety with t/f and during eval. Pt educated about OT and POC.  02/28/17 1547 Done                      User Key     Initials Effective Dates Name Provider Type Discipline     10/17/16 -  Pina Almeida, OTR/L Occupational Therapist OT     10/17/16 -  Mervat Valentine SEVERINO/L Occupational Therapy Assistant OT     10/17/16 -  Montserrat Patel SEVERINO/L Occupational Therapy Assistant OT     10/17/16 -  Sandra Davison SEVERINO/L Occupational Therapy Assistant OT                  OT Recommendation and Plan  Anticipated Discharge Disposition: skilled nursing facility, inpatient rehabilitation facility (TCU/LTACH/SNF)  Planned Therapy Interventions: activity intolerance, adaptive equipment training, ADL retraining, IADL retraining, balance training, bed mobility training, energy conservation, fine motor coordination training, home exercise program, motor coordination training, ROM (Range of Motion), strengthening, transfer training  Therapy Frequency:  (3-14x a week)  Plan of Care Review  Progress: improving  Outcome Summary/Follow up Plan: Pt was able to complete ADL: with 02 remaining above 90%, however pt needed rb's PRN.        Outcome Measures       03/04/17 1030 03/03/17 1330 03/03/17 0905    How much help from another person do you currently need...    Turning from your back to your side while in flat bed without using bedrails?  4  -LN     Moving from lying on back to sitting on the side of a flat bed without bedrails?  4  -LN     Moving to and from a bed to a chair (including a wheelchair)?  3  -LN     Standing up from a chair using your arms (e.g., wheelchair, bedside chair)?  3  -LN     Climbing 3-5 steps with a railing?  3  -LN     To walk in hospital room?  3  -LN     AM-PAC 6 Clicks Score  20  -LN     How much help from another is currently needed...    Putting on and taking off regular  lower body clothing? 2  -KD  2  -KD    Bathing (including washing, rinsing, and drying) 2  -KD  2  -KD    Toileting (which includes using toilet bed pan or urinal) 2  -KD  2  -KD    Putting on and taking off regular upper body clothing 3  -KD  2  -KD    Taking care of personal grooming (such as brushing teeth) 3  -KD  3  -KD    Eating meals 4  -KD  3  -KD    Score 16  -KD  14  -KD    Functional Assessment    Outcome Measure Options  AM-PAC 6 Clicks Basic Mobility (PT)  -LN       03/02/17 1100 03/02/17 0846 03/01/17 1310    How much help from another person do you currently need...    Turning from your back to your side while in flat bed without using bedrails? 4  -MP      Moving from lying on back to sitting on the side of a flat bed without bedrails? 4  -MP      Moving to and from a bed to a chair (including a wheelchair)? 3  -MP      Standing up from a chair using your arms (e.g., wheelchair, bedside chair)? 3  -MP      Climbing 3-5 steps with a railing? 3  -MP      To walk in hospital room? 3  -MP      AM-PAC 6 Clicks Score 20  -MP      How much help from another is currently needed...    Putting on and taking off regular lower body clothing?  2  -BL 2  -JH    Bathing (including washing, rinsing, and drying)  2  -BL 2  -JH    Toileting (which includes using toilet bed pan or urinal)  2  -BL 2  -JH    Putting on and taking off regular upper body clothing  2  -BL 2  -JH    Taking care of personal grooming (such as brushing teeth)  3  -BL 2  -JH    Eating meals  3  -BL 3  -JH    Score  14  -BL 13  -JH    Functional Assessment    Outcome Measure Options AM-PAC 6 Clicks Basic Mobility (PT)  -MP AM-PAC 6 Clicks Daily Activity (OT)  -BL       User Key  (r) = Recorded By, (t) = Taken By, (c) = Cosigned By    Initials Name Provider Type    LN Harriet River PTA Physical Therapy Assistant    KD MAYCOL Macdonald/L Occupational Therapy Assistant     MAYCOL Bnaks/L Occupational Therapy Assistant    MATTHIEU DICK  MAYCOL Davison/MAYELIN Occupational Therapy Assistant    ARNOL Robison, PT Physical Therapist           Time Calculation:         Time Calculation- OT       03/04/17 1144          Time Calculation- OT    OT Start Time 1030  -KD      OT Stop Time 1125  -KD      OT Time Calculation (min) 55 min  -KD      Total Timed Code Minutes- OT 55 minute(s)  -KD      OT Received On 03/04/17  -KD        User Key  (r) = Recorded By, (t) = Taken By, (c) = Cosigned By    Initials Name Provider Type     MAYCOL Macdonald/L Occupational Therapy Assistant           Therapy Charges for Today     Code Description Service Date Service Provider Modifiers Qty    60854925477 HC OT SELF CARE/MGMT/TRAIN EA 15 MIN 3/3/2017 OBED Macdonald GO 4    98710847805 HC OT SELF CARE/MGMT/TRAIN EA 15 MIN 3/4/2017 OBED Macdonald GO 4          OT G-codes  OT Professional Judgement Used?: Yes  OT Functional Scales Options: AM-PAC 6 Clicks Daily Activity (OT)  Score: 16  Functional Limitation: Self care  Self Care Current Status (): At least 40 percent but less than 60 percent impaired, limited or restricted  Self Care Goal Status (): At least 20 percent but less than 40 percent impaired, limited or restricted    OBED Macdonald  3/4/2017

## 2017-03-04 NOTE — PROGRESS NOTES
"   LOS: 11 days    Patient Care Team:  Klaus Faria MD as PCP - General    Subjective:  The patient states he is doing better no marked soa this morning.    Review of Systems:   Review of Systems   All other systems reviewed and are negative.      Objective    Lungs few crackles are heard at the bases.  Heart regular rate and rhythm with systolic murmur at the left sternal border.  Abdomen soft and tender distended no hepatojugular reflux.   Extremities 2+ pitting edema bilaterally    Vital Signs  Temp:  [97.4 °F (36.3 °C)-98.6 °F (37 °C)] 97.4 °F (36.3 °C)  Heart Rate:  [] 101  Resp:  [18-25] 20  BP: (160-181)/(74-79) 177/79    Flowsheet Rows         First Filed Value    Admission Height  66\" (167.6 cm) Documented at 02/21/2017 1119    Admission Weight  160 lb (72.6 kg) Documented at 02/21/2017 1119             I/O last 3 completed shifts:  In: 180 [P.O.:180]  Out: 300 [Urine:300]  No intake or output data in the 24 hours ending 03/04/17 0937    Physical Exam:  Physical Exam   Constitutional: He appears well-developed.   Eyes: Pupils are equal, round, and reactive to light.   Cardiovascular: Normal rate and regular rhythm.    Pulmonary/Chest: Effort normal. He has rales.   Abdominal: Soft. Bowel sounds are normal.        Results Review:      Results from last 7 days  Lab Units 03/04/17 0611 03/03/17 0652 03/02/17  0527   SODIUM mmol/L 133* 134* 134*   POTASSIUM mmol/L 4.1 3.9 3.5   CHLORIDE mmol/L 98 99 99   TOTAL CO2 mmol/L 26.0 26.0 27.0   BUN mg/dL 103* 92* 88*   CREATININE mg/dL 2.25* 2.07* 2.10*   CALCIUM mg/dL 8.6 8.8 9.0   GLUCOSE mg/dL 273* 192* 220*       Estimated Creatinine Clearance: 27.4 mL/min (by C-G formula based on Cr of 2.25).      Results from last 7 days  Lab Units 03/04/17 0611 03/03/17 0652 03/02/17  0527   MAGNESIUM mg/dL 2.6* 2.6* 2.7*               Results from last 7 days  Lab Units 03/04/17  0611 03/03/17  0652 03/02/17  0527 03/01/17  0634 02/28/17  0544   WBC 10*3/mm3 8.79 " 9.73 8.96 9.99* 7.05   HEMOGLOBIN g/dL 11.5* 12.0* 12.4* 12.5* 12.0*   PLATELETS 10*3/mm3 171 193 201 207 188         Results from last 7 days  Lab Units 03/04/17  0611 03/03/17  0652 03/02/17  0527 03/01/17  1234 02/28/17  0544   INR  2.80* 3.37* 4.08* 2.87* 1.50*         Imaging Results (last 24 hours)     ** No results found for the last 24 hours. **            Medication Review:   Current Facility-Administered Medications   Medication Dose Route Frequency Provider Last Rate Last Dose   • acetaminophen (TYLENOL) tablet 650 mg  650 mg Oral Q6H PRN Adriana Wells MD   650 mg at 02/28/17 1450   • albuterol (PROVENTIL HFA;VENTOLIN HFA) inhaler 2 puff  2 puff Inhalation Q4H PRN Adriana Wells MD       • albuterol (PROVENTIL) nebulizer solution 0.083% 2.5 mg/3mL  2.5 mg Nebulization Q4H PRN LONNIE Leon       • atorvastatin (LIPITOR) tablet 20 mg  20 mg Oral Daily Adriana Wells MD   20 mg at 03/03/17 0858   • budesonide (PULMICORT) nebulizer solution 0.5 mg  0.5 mg Nebulization BID - RT Zackary Anand MD   0.5 mg at 03/03/17 1940   • cholecalciferol (VITAMIN D3) tablet 1,000 Units  1,000 Units Oral Daily Adriana Wells MD   1,000 Units at 03/03/17 0858   • clopidogrel (PLAVIX) tablet 75 mg  75 mg Oral Daily Adriana Wells MD   75 mg at 03/03/17 0858   • dextrose (D50W) solution 25 g  25 g Intravenous Q15 Min PRN Adriana Wells MD       • dextrose (D50W) solution 50 mL  50 mL Intravenous Q1H PRN Stephani Bingham MD   50 mL at 02/22/17 0645   • dextrose (GLUTOSE) oral gel 15 g  15 g Oral Q15 Min PRN Adriana Wells MD       • diltiaZEM CD (CARDIZEM CD) 24 hr capsule 240 mg  240 mg Oral Daily Adriana Wells MD   240 mg at 03/03/17 0858   • escitalopram (LEXAPRO) tablet 10 mg  10 mg Oral Daily Adriana Wells MD   10 mg at 03/03/17 0857   • furosemide (LASIX) tablet 40 mg  40 mg Oral BID Rcihi Hayward MD       • glipiZIDE (GLUCOTROL) 24 hr tablet 2.5 mg   2.5 mg Oral Nightly Adriana Wells MD   2.5 mg at 03/03/17 2118   • glucagon (human recombinant) (GLUCAGEN DIAGNOSTIC) injection 1 mg  1 mg Subcutaneous Q15 Min PRN Adriana Wells MD       • hydrALAZINE (APRESOLINE) tablet 100 mg  100 mg Oral Q8H Richi Hayward MD   100 mg at 03/04/17 0602   • insulin aspart (novoLOG) injection 0-9 Units  0-9 Units Subcutaneous 4x Daily AC & at Bedtime Adriana Wells MD   7 Units at 03/03/17 2121   • insulin detemir (LEVEMIR) injection 15 Units  15 Units Subcutaneous Nightly Zackary Anand MD   15 Units at 03/03/17 2343   • ipratropium (ATROVENT) nebulizer solution 0.5 mg  0.5 mg Nebulization 4x Daily - RT Zackary Anand MD   0.5 mg at 03/03/17 1941   • isosorbide mononitrate (IMDUR) 24 hr tablet 60 mg  60 mg Oral Q24H Zackary Anand MD   60 mg at 03/03/17 0858   • LORazepam (ATIVAN) injection 0.5 mg  0.5 mg Intravenous Q4H PRN Jimbo Shelley MD   0.5 mg at 03/03/17 2118   • methylPREDNISolone sodium succinate (SOLU-Medrol) injection 40 mg  40 mg Intravenous Q12H Adriana Wells MD   40 mg at 03/04/17 0319   • pantoprazole (PROTONIX) EC tablet 40 mg  40 mg Oral Q AM Fabiana Wells MD   40 mg at 03/04/17 0602   • Pharmacy to dose warfarin   Does not apply Continuous PRN Zackary Anand MD       • phenol (CHLORASEPTIC) 1.4 % liquid 2 spray  2 spray Mouth/Throat Q2H PRN Fabiana Wells MD       • sodium chloride 0.9 % flush 10 mL  10 mL Intravenous PRN David Henderson MD   10 mL at 03/03/17 2125       Assessment/Plan    1 chronic kidney disease stage III with renal function at baseline. Has high bun than cr sec to steroid +/- lasix  2 congestive heart failure with significant volume overload. 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 Continue with fluid and salt restriction.  3. ct same anti htn    Principal Problem:    CHF (congestive heart failure)  Active Problems:    Nonrheumatic aortic valve stenosis    Persistent atrial fibrillation    Congestive heart failure    Aortic valve stenosis    Dyslipidemia    Essential hypertension    Kidney insufficiency    History of cerebrovascular accident    Tobacco dependence syndrome              Richi Hayward MD  03/04/17  9:37 AM

## 2017-03-04 NOTE — PLAN OF CARE
Problem: Patient Care Overview (Adult)  Goal: Plan of Care Review  Outcome: Ongoing (interventions implemented as appropriate)    03/03/17 2010 03/04/17 1030   Coping/Psychosocial Response Interventions   Plan Of Care Reviewed With patient --    Patient Care Overview   Progress --  improving   Outcome Evaluation   Outcome Summary/Follow up Plan --  Pt was able to complete ADL: with 02 remaining above 90%, however pt needed rb's PRN.       Goal: Discharge Needs Assessment  Outcome: Ongoing (interventions implemented as appropriate)    02/22/17 0951 02/28/17 1500 03/01/17 1739   Discharge Needs Assessment   Concerns To Be Addressed --  --  discharge planning concerns   Readmission Within The Last 30 Days --  --  no previous admission in last 30 days   Equipment Needed After Discharge --  --  oxygen   Discharge Facility/Level Of Care Needs --  --  --    Discharge Disposition --  --  home healthcare service   Discharge Planning Comments Pt may require Home O2. Pt may benefit from  services.  --  --    Current Health   Outpatient/Agency/Support Group Needs --  --  homecare agency (specify level of care)   Anticipated Changes Related to Illness --  --  none   Self-Care   Equipment Currently Used at Home --  cane, quad;cane, straight;grab bar --    Living Environment   Transportation Available --  --  car;family or friend will provide     03/03/17 1330   Discharge Needs Assessment   Concerns To Be Addressed --    Readmission Within The Last 30 Days --    Equipment Needed After Discharge --    Discharge Facility/Level Of Care Needs skilled transitional care   Discharge Disposition --    Discharge Planning Comments --    Current Health   Outpatient/Agency/Support Group Needs --    Anticipated Changes Related to Illness --    Self-Care   Equipment Currently Used at Home --    Living Environment   Transportation Available --          Problem: Inpatient Occupational Therapy  Goal: Patient Education Goal LTG- OT  Outcome:  Ongoing (interventions implemented as appropriate)    02/28/17 1500 03/04/17 1030   Patient Education OT LTG   Patient Education OT LTG, Date Established 02/28/17 --    Patient Education OT LTG, Time to Achieve by discharge --    Patient Education OT LTG, Education Type HEP;home safety --    Patient Education OT LTG, Education Understanding verbalizes understanding;demonstrates adequately;independent --    Patient Education OT LTG, Date Goal Reviewed --  03/04/17   Patient Education OT LTG Outcome --  goal not met   Patient Education OT LTG, Reason Goal Not Met --  unable to make needed progress       Goal: ADL Goal LTG- OT  Outcome: Ongoing (interventions implemented as appropriate)    02/28/17 1500 03/04/17 1030   ADL OT LTG   ADL OT LTG, Date Established 02/28/17 --    ADL OT LTG, Time to Achieve by discharge --    ADL OT LTG, Activity Type ADL skills --    ADL OT LTG, [place] Level min verbal cues;standby assist  (AE as needed) --    ADL OT LTG, Date Goal Reviewed --  03/04/17   ADL OT LTG, Outcome --  goal not met   ADL OT LTG, Reason Goal Not Met --  unable to make needed progress       Goal: Activity Tolerance Goal STG- OT  Outcome: Ongoing (interventions implemented as appropriate)    02/28/17 1500 03/04/17 1030   Activity Tolerance OT STG   Activity Tolerance Goal OT STG, Date Established 02/28/17 --    Activity Tolerance Goal OT STG, Time to Achieve 1 wk --    Activity Tolerance Goal OT STG, Activity Level 10 min activity;O2 sat >/equal to 90%;with 2 rest breaks --    Activity Tolerance Goal OT STG, Date Goal Reviewed --  03/04/17   Activity Tolerance Goal OT STG, Outcome --  goal not met   Activity Tolerance Goal OT STG, Reason Goal Not Met --  unable to make needed progress       Goal: Endurance Goal LTG- OT  Outcome: Ongoing (interventions implemented as appropriate)    02/28/17 1500 03/04/17 1030   Endurance OT LTG   Endurance Goal OT LTG, Date Established 02/28/17 --    Endurance Goal OT LTG,  Time to Achieve by discharge --    Endurance Goal OT LTG, Activity Level endurance 2 good-  (15 minute act) --    Endurance Goal OT LTG, Date Goal Reviewed --  03/04/17   Endurance Goal OT LTG, Outcome --  goal not met   Endurance Goal OT LTG, Reason Goal Not Met --  unable to make needed progress

## 2017-03-04 NOTE — PROGRESS NOTES
[facility] Medicine Services  INPATIENT PROGRESS NOTE     LOS: 11 days   Patient Care Team:  Klaus Faria MD as PCP - General    Chief Complaint:  Complains of having shortness of breath.      Subjective     Interval History:     Patient Complaints: Patient sitting at the edge of bed side resting comfortably.  Patient denies any shortness of breath at this point.    History taken from: patient    Review of Systems:    Review of Systems   Constitutional: Negative for appetite change, chills, diaphoresis and fever.   HENT: Negative for congestion, rhinorrhea, sore throat and trouble swallowing.    Eyes: Negative for visual disturbance.   Respiratory: Positive for shortness of breath and wheezing. Negative for cough and chest tightness.    Cardiovascular: Negative for chest pain, palpitations and leg swelling.   Gastrointestinal: Negative for abdominal pain, blood in stool, diarrhea, nausea and vomiting.   Endocrine: Negative for cold intolerance and heat intolerance.   Genitourinary: Negative for decreased urine volume and difficulty urinating.   Musculoskeletal: Negative for back pain, gait problem and neck pain.   Skin: Negative for rash.   Neurological: Positive for tremors and weakness. Negative for dizziness, syncope, light-headedness, numbness and headaches.   Psychiatric/Behavioral: The patient is not nervous/anxious.          Objective     Vital Signs  Temp:  [97.4 °F (36.3 °C)-98.6 °F (37 °C)] 97.4 °F (36.3 °C)  Heart Rate:  [] 101  Resp:  [18-25] 20  BP: (160-181)/(74-79) 177/79    Physical Exam:   Physical Exam   Constitutional: He is oriented to person, place, and time. He appears well-developed and well-nourished.   HENT:   Head: Normocephalic and atraumatic.   Nose: Nose normal.   Eyes: Conjunctivae and EOM are normal. Pupils are equal, round, and reactive to light.   Neck: Normal range of motion. Neck supple. No JVD present. No tracheal deviation  present. No thyromegaly present.   Cardiovascular: Regular rhythm, normal heart sounds and intact distal pulses.  Tachycardia present.    Pulmonary/Chest: He is in respiratory distress. He has wheezes. He has rales (Crackles and Rales to 2/3rd of the Lung Fields bilateraly.). He exhibits no tenderness.   Abdominal: Soft. Bowel sounds are normal. He exhibits no distension. There is no tenderness. There is no rebound and no guarding.   Musculoskeletal: Normal range of motion. He exhibits no edema.   Lymphadenopathy:     He has no cervical adenopathy.   Neurological: He is alert and oriented to person, place, and time. He has normal reflexes. No cranial nerve deficit.   Skin: Skin is warm and dry.   Intact   Psychiatric: He has a normal mood and affect.   Nursing note and vitals reviewed.         Results Review:         Results from last 7 days  Lab Units 03/04/17  0611 03/03/17  0652 03/02/17  0527 03/01/17  0734 02/28/17  0544 02/27/17  0524 02/26/17  0730   SODIUM mmol/L 133* 134* 134* 136* 134* 138 139   POTASSIUM mmol/L 4.1 3.9 3.5 3.6 3.6 3.7 4.0   CHLORIDE mmol/L 98 99 99 99 100 100 100   TOTAL CO2 mmol/L 26.0 26.0 27.0 26.0 27.0 29.0 29.0   BUN mg/dL 103* 92* 88* 85* 79* 79* 71*   CREATININE mg/dL 2.25* 2.07* 2.10* 2.06* 2.01* 2.02* 2.09*   GLUCOSE mg/dL 273* 192* 220* 136* 192* 113* 127*   CALCIUM mg/dL 8.6 8.8 9.0 9.0 9.0 9.1 9.6         Results from last 7 days  Lab Units 03/04/17  0611 03/03/17  0652 03/02/17  0527 03/01/17  0734 02/28/17  0544   MAGNESIUM mg/dL 2.6* 2.6* 2.7* 2.6* 2.6*         Results from last 7 days  Lab Units 03/04/17  0611 03/03/17  0652 03/02/17  0527 03/01/17  0634 02/28/17  0544 02/27/17  0524 02/26/17  0730   WBC 10*3/mm3 8.79 9.73 8.96 9.99* 7.05 7.33 9.15   HEMOGLOBIN g/dL 11.5* 12.0* 12.4* 12.5* 12.0* 12.1* 12.6*   HEMATOCRIT % 34.1* 34.8* 36.1* 36.5* 35.2* 36.0* 38.0*   PLATELETS 10*3/mm3 171 193 201 207 188 192 226         Results from last 7 days  Lab Units 03/04/17  0611  03/03/17  0652 03/02/17  0527 03/01/17  1234 02/28/17  0544 02/27/17  0524 02/26/17  0730   INR  2.80* 3.37* 4.08* 2.87* 1.50* 1.78* 1.77*        Imaging Results (last 7 days)     Procedure Component Value Units Date/Time    XR Chest 1 View [[id number]] Collected:  02/21/17 1158     Updated:  02/21/17 1205    Narrative:       [facility] [place]    Patient Name: MR. ELLEN MEDINA    ORDERING: BERTHA MILLERMARY     ATTENDING:    REFERRING: BERTHA ROYAL    -----------------------    PROCEDURE: Portable chest x-ray    TECHNIQUE: Single AP view of the chest    COMPARISON: 1/30/2017    HISTORY: Chest pain protocol    FINDINGS:     Life-support devices: Left-sided cardiac pacer stable in position    Lungs/pleura: Blunting of the costophrenic angles and prominent  perihilar markings bilaterally. No pneumothorax.    Heart, hilar and mediastinal structures: Cardiac silhouette  slightly enlarged. Sternal suture wires appear stable. Thoracic  aorta contains atherosclerotic calcification. Patient's chin  partially obscures the lung apices.      Impression:       CONCLUSION:  Small to moderate sized bilateral pleural effusions and  superimposed mild pulmonary edema.    Electronically signed by:  Clark Lucero MD  2/21/2017 12:03 PM  CST Workstation: [id number]    XR Chest PA & Lateral [[id number]] Collected:  02/23/17 1045     Updated:  02/23/17 1049    Narrative:       Patient Name:  MR. ELLEN MEDINA  Patient ID:  [id number]   Ordering:  TRINITY NAVAS  Attending:  NOHEMY POZO  Referring:  TRINITY NAVAS  ------------------------------------------------  Chest pain.    Chest, AP and lateral views.    Comparison is made with study dated February 21, 2017.    The cardiac silhouette is within normal limits. There are small  to moderate bilateral pleural effusions. There are bilateral  interstitial markings. There are median sternotomy wires. There  is pacemaker with two leads.      Impression:        CONCLUSION: Small to moderate bilateral pleural effusions.    Electronically signed by:  Nnamdi Marquis MD  2/23/2017 10:48  AM CST Workstation: [id number]    XR Chest PA & Lateral [[id number]] Collected:  02/27/17 0742     Updated:  02/27/17 0745    Narrative:       Patient Name:  MR. ELLEN MEDINA  Patient ID:  [id number]   Ordering:  CECY ARMENTA  Attending:  NOHEMY POZO  Referring:  CECY ARMENTA  ------------------------------------------------      PROCEDURE: Chest PA and lateral    REASON FOR EXAM: shortness of air, I50.9 Heart failure,  unspecified    FINDINGS: Comparison study dated February 23, 2017.  Postsurgical  changes with median sternotomy. Stable pacemaker and leads. .  Cardiac size appears within normal limits. Bibasilar small patchy  opacities. Lungs otherwise clear. Mild worsening of moderate  right pleural effusion. Small left pleural effusion. No acute  osseous abnormality.      Impression:       1.  Mild worsening of moderate right pleural effusion.  2.  Small left pleural effusion which appears stable.  3.  Bibasilar small patchy opacities suspicious for pulmonary  edema versus pneumonia versus subsegmental atelectasis.    Electronically signed by:  Remy Caballero MD  2/27/2017 7:44 AM CST  Workstation: [id number]                                   Medication Review:   Current Facility-Administered Medications   Medication Dose Route Frequency Provider Last Rate Last Dose   • acetaminophen (TYLENOL) tablet 650 mg  650 mg Oral Q6H PRN Adriana Wells MD   650 mg at 02/28/17 1450   • albuterol (PROVENTIL HFA;VENTOLIN HFA) inhaler 2 puff  2 puff Inhalation Q4H PRN Adriana Wells MD       • albuterol (PROVENTIL) nebulizer solution 0.083% 2.5 mg/3mL  2.5 mg Nebulization Q4H PRN LONNIE Leon       • atorvastatin (LIPITOR) tablet 20 mg  20 mg Oral Daily Adriana Wells MD   20 mg at 03/04/17 0987   • budesonide (PULMICORT) nebulizer solution 0.5 mg  0.5 mg  Nebulization BID - RT Zackary Girish Anand MD   0.5 mg at 03/03/17 1940   • cholecalciferol (VITAMIN D3) tablet 1,000 Units  1,000 Units Oral Daily Adriana Wells MD   1,000 Units at 03/04/17 0952   • clopidogrel (PLAVIX) tablet 75 mg  75 mg Oral Daily Adriana Wells MD   75 mg at 03/04/17 0952   • dextrose (D50W) solution 25 g  25 g Intravenous Q15 Min PRN Adriana Wells MD       • dextrose (D50W) solution 50 mL  50 mL Intravenous Q1H PRN Stephani Bingham MD   50 mL at 02/22/17 0645   • dextrose (GLUTOSE) oral gel 15 g  15 g Oral Q15 Min PRN Adriana Wells MD       • diltiaZEM CD (CARDIZEM CD) 24 hr capsule 240 mg  240 mg Oral Daily Adriana Wells MD   240 mg at 03/04/17 0952   • escitalopram (LEXAPRO) tablet 10 mg  10 mg Oral Daily Adriana Wells MD   10 mg at 03/04/17 0952   • furosemide (LASIX) tablet 40 mg  40 mg Oral BID Richi Hayward MD   40 mg at 03/04/17 0952   • glipiZIDE (GLUCOTROL) 24 hr tablet 2.5 mg  2.5 mg Oral Nightly Adriana Wells MD   2.5 mg at 03/03/17 2118   • glucagon (human recombinant) (GLUCAGEN DIAGNOSTIC) injection 1 mg  1 mg Subcutaneous Q15 Min PRN Adriana Wells MD       • hydrALAZINE (APRESOLINE) tablet 100 mg  100 mg Oral Q8H Richi Hayward MD   100 mg at 03/04/17 0602   • insulin aspart (novoLOG) injection 0-9 Units  0-9 Units Subcutaneous 4x Daily AC & at Bedtime Adriana Wells MD   7 Units at 03/04/17 0954   • insulin detemir (LEVEMIR) injection 15 Units  15 Units Subcutaneous Nightly Zackary Anand MD   15 Units at 03/03/17 2343   • ipratropium (ATROVENT) nebulizer solution 0.5 mg  0.5 mg Nebulization 4x Daily - RT Zackary Girish Anand, MD   0.5 mg at 03/03/17 1941   • isosorbide mononitrate (IMDUR) 24 hr tablet 60 mg  60 mg Oral Q24H Zackary Anand MD   60 mg at 03/04/17 0952   • LORazepam (ATIVAN) injection 0.5 mg  0.5 mg Intravenous Q4H PRN Jimbo Shelley MD   0.5 mg at 03/03/17 2118   • methylPREDNISolone sodium  succinate (SOLU-Medrol) injection 40 mg  40 mg Intravenous Q12H Adriana Wells MD   40 mg at 03/04/17 0319   • pantoprazole (PROTONIX) EC tablet 40 mg  40 mg Oral Q AM Fabiana Wells MD   40 mg at 03/04/17 0602   • Pharmacy to dose warfarin   Does not apply Continuous PRN Zackary Anand MD       • phenol (CHLORASEPTIC) 1.4 % liquid 2 spray  2 spray Mouth/Throat Q2H PRN Fabiana Wells MD       • sodium chloride 0.9 % flush 10 mL  10 mL Intravenous PRN David Henderson MD   10 mL at 03/03/17 2125   • warfarin (COUMADIN) tablet 4 mg  4 mg Oral Daily Fabiana Wells MD             Assessment/Plan     Principal Problem:    CHF (congestive heart failure)  Active Problems:    Nonrheumatic aortic valve stenosis    Persistent atrial fibrillation    Congestive heart failure    Aortic valve stenosis    Dyslipidemia    Essential hypertension    Kidney insufficiency    History of cerebrovascular accident    Tobacco dependence syndrome          -Patient's condition improved overnight.  -We'll repeat her x-ray in the morning.  -We'll continue with PT OT treatment.  -We'll continue IV diuretics.  -Nephrology and cardiology follow-up appreciated.  -We'll consider LTAC evaluation if patient agrees.  -DVT and GI prophylaxis in place.      Fabiana Wells MD  03/04/17  12:04 PM    EMR Dragon/Transcription disclaimer:   Much of this encounter note is an electronic transcription/translation of spoken language to printed text. The electronic translation of spoken language may permit erroneous, or at times, nonsensical words or phrases to be inadvertently transcribed; Although I have reviewed the note for such errors, some may still exist.

## 2017-03-05 NOTE — PROGRESS NOTES
"Anticoagulation by Pharmacy - Warfarin    Kevin Henning is a 76 y.o.male  [Ht: 65.98\" (167.6 cm); Wt: 176 lb 4.8 oz (80 kg)] on Warfarin 4 mg PO  for indication of A. fib.    Goal INR: 2-3  Today's INR:   Lab Results   Component Value Date    INR 2.60 (H) 03/05/2017         Lab Results   Component Value Date    INR 2.60 (H) 03/05/2017    INR 2.80 (H) 03/04/2017    INR 3.37 (H) 03/03/2017    PROTIME 27.1 (H) 03/05/2017    PROTIME 28.6 (H) 03/04/2017    PROTIME 32.9 (H) 03/03/2017     Lab Results   Component Value Date    HGB 11.7 (L) 03/05/2017    HGB 11.5 (L) 03/04/2017    HGB 12.0 (L) 03/03/2017     Lab Results   Component Value Date    HCT 34.3 (L) 03/05/2017    HCT 34.1 (L) 03/04/2017    HCT 34.8 (L) 03/03/2017     Assessment/Plan:  INR therapeutic.  No change, will follow    Enrique Terry III, [facility]  03/05/17 1:48 PM    "

## 2017-03-05 NOTE — PROGRESS NOTES
[facility] Medicine Services  INPATIENT PROGRESS NOTE     LOS: 12 days   Patient Care Team:  Klaus Faria MD as PCP - General    Chief Complaint:  Complains of having shortness of breath.      Subjective     Interval History:     Patient Complaints: Patient continues to sit by the bedside.  Patient complains of having respiratory distress however oxygen saturation seems to be stable.    History taken from: patient    Review of Systems:    Review of Systems   Constitutional: Negative for appetite change, chills, diaphoresis and fever.   HENT: Negative for congestion, rhinorrhea, sore throat and trouble swallowing.    Eyes: Negative for visual disturbance.   Respiratory: Positive for shortness of breath. Negative for cough and chest tightness.    Cardiovascular: Negative for chest pain, palpitations and leg swelling.   Gastrointestinal: Negative for abdominal pain, blood in stool, diarrhea, nausea and vomiting.   Endocrine: Negative for cold intolerance and heat intolerance.   Genitourinary: Negative for decreased urine volume and difficulty urinating.   Musculoskeletal: Negative for back pain, gait problem and neck pain.   Skin: Negative for rash.   Neurological: Positive for tremors and weakness. Negative for dizziness, syncope, light-headedness, numbness and headaches.   Psychiatric/Behavioral: The patient is not nervous/anxious.          Objective     Vital Signs  Temp:  [97.1 °F (36.2 °C)-97.7 °F (36.5 °C)] 97.7 °F (36.5 °C)  Heart Rate:  [] 80  Resp:  [20-25] 20  BP: (144-161)/(65-72) 160/72  FiO2 (%):  [35 %] 35 %    Physical Exam:   Physical Exam   Constitutional: He is oriented to person, place, and time. He appears well-developed and well-nourished.   HENT:   Head: Normocephalic and atraumatic.   Nose: Nose normal.   Eyes: Conjunctivae and EOM are normal. Pupils are equal, round, and reactive to light.   Neck: Normal range of motion. Neck supple. No JVD  present. No tracheal deviation present. No thyromegaly present.   Cardiovascular: Regular rhythm, normal heart sounds and intact distal pulses.  Tachycardia present.    Pulmonary/Chest: He is in respiratory distress. He has rales (Crackles and Rales at the base of Lung Fields bilateraly.). He exhibits no tenderness.   Abdominal: Soft. Bowel sounds are normal. He exhibits no distension. There is no tenderness. There is no rebound and no guarding.   Musculoskeletal: Normal range of motion. He exhibits no edema.   Lymphadenopathy:     He has no cervical adenopathy.   Neurological: He is alert and oriented to person, place, and time. He has normal reflexes. No cranial nerve deficit.   Skin: Skin is warm and dry.   Intact   Psychiatric: He has a normal mood and affect.   Nursing note and vitals reviewed.         Results Review:         Results from last 7 days  Lab Units 03/05/17  0534 03/05/17  0500 03/04/17  0611 03/03/17  0652 03/02/17  0527 03/01/17  0734 02/28/17  0544 02/27/17  0524   SODIUM mmol/L 138  --  133* 134* 134* 136* 134* 138   SODIUM, ARTERIAL mmol/L  --  135.4*  --   --   --   --   --   --    POTASSIUM mmol/L 3.7  --  4.1 3.9 3.5 3.6 3.6 3.7   CHLORIDE mmol/L 101  --  98 99 99 99 100 100   TOTAL CO2 mmol/L 26.0  --  26.0 26.0 27.0 26.0 27.0 29.0   BUN mg/dL 101*  --  103* 92* 88* 85* 79* 79*   CREATININE mg/dL 2.15*  --  2.25* 2.07* 2.10* 2.06* 2.01* 2.02*   GLUCOSE mg/dL 115*  --  273* 192* 220* 136* 192* 113*   GLUCOSE, ARTERIAL mmol/L  --  119  --   --   --   --   --   --    CALCIUM mg/dL 8.6  --  8.6 8.8 9.0 9.0 9.0 9.1         Results from last 7 days  Lab Units 03/05/17  0534 03/04/17  0611 03/03/17  0652 03/02/17  0527 03/01/17  0734 02/28/17  0544   MAGNESIUM mg/dL 2.6* 2.6* 2.6* 2.7* 2.6* 2.6*         Results from last 7 days  Lab Units 03/05/17  0534 03/04/17  0611 03/03/17  0652 03/02/17  0527 03/01/17  0634 02/28/17  0544 02/27/17  0524   WBC 10*3/mm3 7.82 8.79 9.73 8.96 9.99* 7.05 7.33    HEMOGLOBIN g/dL 11.7* 11.5* 12.0* 12.4* 12.5* 12.0* 12.1*   HEMATOCRIT % 34.3* 34.1* 34.8* 36.1* 36.5* 35.2* 36.0*   PLATELETS 10*3/mm3 171 171 193 201 207 188 192         Results from last 7 days  Lab Units 03/05/17  0534 03/04/17  0611 03/03/17  0652 03/02/17  0527 03/01/17  1234 02/28/17  0544 02/27/17  0524   INR  2.60* 2.80* 3.37* 4.08* 2.87* 1.50* 1.78*        Imaging Results (last 7 days)     Procedure Component Value Units Date/Time    XR Chest 1 View [[id number]] Collected:  02/21/17 1158     Updated:  02/21/17 1205    Narrative:       [facility] [place]    Patient Name: MR. ELLEN MEDINA    ORDERING: BERTHA ROYAL     ATTENDING:    REFERRING: BERTHA ROYAL    -----------------------    PROCEDURE: Portable chest x-ray    TECHNIQUE: Single AP view of the chest    COMPARISON: 1/30/2017    HISTORY: Chest pain protocol    FINDINGS:     Life-support devices: Left-sided cardiac pacer stable in position    Lungs/pleura: Blunting of the costophrenic angles and prominent  perihilar markings bilaterally. No pneumothorax.    Heart, hilar and mediastinal structures: Cardiac silhouette  slightly enlarged. Sternal suture wires appear stable. Thoracic  aorta contains atherosclerotic calcification. Patient's chin  partially obscures the lung apices.      Impression:       CONCLUSION:  Small to moderate sized bilateral pleural effusions and  superimposed mild pulmonary edema.    Electronically signed by:  Clark Lucero MD  2/21/2017 12:03 PM  CST Workstation: [id number]    XR Chest PA & Lateral [[id number]] Collected:  02/23/17 1045     Updated:  02/23/17 1049    Narrative:       Patient Name:  MR. ELLEN MEDINA  Patient ID:  [id number]   Ordering:  TRINITY NAVAS  Attending:  NOHEMY POZO  Referring:  TRINITY NAVAS  ------------------------------------------------  Chest pain.    Chest, AP and lateral views.    Comparison is made with study dated February 21, 2017.    The cardiac  silhouette is within normal limits. There are small  to moderate bilateral pleural effusions. There are bilateral  interstitial markings. There are median sternotomy wires. There  is pacemaker with two leads.      Impression:       CONCLUSION: Small to moderate bilateral pleural effusions.    Electronically signed by:  Nnamdi Marquis MD  2/23/2017 10:48  AM CST Workstation: [id number]    XR Chest PA & Lateral [[id number]] Collected:  02/27/17 0742     Updated:  02/27/17 0745    Narrative:       Patient Name:  MR. ELLEN MEDINA  Patient ID:  [id number]   Ordering:  CECY ARMENTA  Attending:  NOHEMY POZO  Referring:  CECY ARMENTA  ------------------------------------------------      PROCEDURE: Chest PA and lateral    REASON FOR EXAM: shortness of air, I50.9 Heart failure,  unspecified    FINDINGS: Comparison study dated February 23, 2017.  Postsurgical  changes with median sternotomy. Stable pacemaker and leads. .  Cardiac size appears within normal limits. Bibasilar small patchy  opacities. Lungs otherwise clear. Mild worsening of moderate  right pleural effusion. Small left pleural effusion. No acute  osseous abnormality.      Impression:       1.  Mild worsening of moderate right pleural effusion.  2.  Small left pleural effusion which appears stable.  3.  Bibasilar small patchy opacities suspicious for pulmonary  edema versus pneumonia versus subsegmental atelectasis.    Electronically signed by:  Remy Caballero MD  2/27/2017 7:44 AM CST  Workstation: [id number]                                   Medication Review:   Current Facility-Administered Medications   Medication Dose Route Frequency Provider Last Rate Last Dose   • acetaminophen (TYLENOL) tablet 650 mg  650 mg Oral Q6H PRN Adriana Wells MD   650 mg at 03/05/17 1136   • albuterol (PROVENTIL HFA;VENTOLIN HFA) inhaler 2 puff  2 puff Inhalation Q4H PRN Adriana Wells MD       • albuterol (PROVENTIL) nebulizer solution 0.083% 2.5 mg/3mL   2.5 mg Nebulization Q4H PRN LONNIE Leon       • atorvastatin (LIPITOR) tablet 20 mg  20 mg Oral Daily Adriana Wells MD   20 mg at 03/05/17 0846   • budesonide (PULMICORT) nebulizer solution 0.5 mg  0.5 mg Nebulization BID - RT Zackary Girish Anand MD   0.5 mg at 03/05/17 0651   • cholecalciferol (VITAMIN D3) tablet 1,000 Units  1,000 Units Oral Daily Adriana Wells MD   1,000 Units at 03/05/17 0846   • clopidogrel (PLAVIX) tablet 75 mg  75 mg Oral Daily Adriana Wells MD   75 mg at 03/05/17 0846   • dextrose (D50W) solution 25 g  25 g Intravenous Q15 Min PRN Adriana Wells MD       • dextrose (D50W) solution 50 mL  50 mL Intravenous Q1H PRN Stephani Bingham MD   50 mL at 02/22/17 0645   • dextrose (GLUTOSE) oral gel 15 g  15 g Oral Q15 Min PRN Adriana Wells MD       • diltiaZEM CD (CARDIZEM CD) 24 hr capsule 240 mg  240 mg Oral Daily Adriana Wells MD   240 mg at 03/05/17 0846   • escitalopram (LEXAPRO) tablet 10 mg  10 mg Oral Daily Adriana Wells MD   10 mg at 03/05/17 0845   • furosemide (LASIX) injection 20 mg  20 mg Intravenous Once Richi Hayward MD       • furosemide (LASIX) tablet 40 mg  40 mg Oral BID Richi Hayward MD   40 mg at 03/05/17 0845   • glipiZIDE (GLUCOTROL) 24 hr tablet 2.5 mg  2.5 mg Oral Nightly Adriana Wells MD   2.5 mg at 03/04/17 2059   • glucagon (human recombinant) (GLUCAGEN DIAGNOSTIC) injection 1 mg  1 mg Subcutaneous Q15 Min PRN Adriana Wells MD       • hydrALAZINE (APRESOLINE) tablet 100 mg  100 mg Oral Q8H Richi Hayward MD   100 mg at 03/05/17 0845   • insulin aspart (novoLOG) injection 0-9 Units  0-9 Units Subcutaneous 4x Daily AC & at Bedtime Adriana Wells MD   6 Units at 03/04/17 2051   • insulin detemir (LEVEMIR) injection 15 Units  15 Units Subcutaneous Nightly Zackary Anand MD   15 Units at 03/04/17 2053   • ipratropium (ATROVENT) nebulizer solution 0.5 mg  0.5 mg Nebulization 4x Daily - RT Zackary  Girish Anand MD   0.5 mg at 03/05/17 1035   • isosorbide mononitrate (IMDUR) 24 hr tablet 60 mg  60 mg Oral Q24H Zackary Anand MD   60 mg at 03/05/17 0846   • LORazepam (ATIVAN) injection 0.5 mg  0.5 mg Intravenous Q4H PRN Jimbo Shelley MD   0.5 mg at 03/04/17 2100   • methylPREDNISolone sodium succinate (SOLU-Medrol) injection 20 mg  20 mg Intravenous Q12H Richi Hayward MD       • pantoprazole (PROTONIX) EC tablet 40 mg  40 mg Oral Q AM Fabiana Wells MD   40 mg at 03/05/17 0847   • Pharmacy to dose warfarin   Does not apply Continuous PRN Zackary Anand MD       • phenol (CHLORASEPTIC) 1.4 % liquid 2 spray  2 spray Mouth/Throat Q2H PRN Fabiana Wells MD       • sodium chloride 0.9 % flush 10 mL  10 mL Intravenous PRN David Henderson MD   10 mL at 03/03/17 2125   • warfarin (COUMADIN) tablet 4 mg  4 mg Oral Daily Fabiana Wells MD   4 mg at 03/04/17 1735         Assessment/Plan     Principal Problem:    CHF (congestive heart failure)  Active Problems:    Nonrheumatic aortic valve stenosis    Persistent atrial fibrillation    Congestive heart failure    Aortic valve stenosis    Dyslipidemia    Essential hypertension    Kidney insufficiency    History of cerebrovascular accident    Tobacco dependence syndrome          -No significant changes noted.  -We'll await x-ray results.  -We'll discuss with nephrology of possible Lasix drip.  -We'll continue with PT OT.  -Had a brief discussion with daughter, wished to make patient comfortable.  -We'll consider LTAC evaluation if patient agrees.  -DVT and GI prophylaxis in place.      Fabiana Wells MD  03/05/17  11:38 AM    EMR Dragon/Transcription disclaimer:   Much of this encounter note is an electronic transcription/translation of spoken language to printed text. The electronic translation of spoken language may permit erroneous, or at times, nonsensical words or phrases to be inadvertently transcribed; Although I have  reviewed the note for such errors, some may still exist.

## 2017-03-05 NOTE — PROGRESS NOTES
Acute Care - Physical Therapy Treatment Note  [facility]     Patient Name: Kevin Henning  : 1940  MRN: [id number]  Today's Date: 3/5/2017  Onset of Illness/Injury or Date of Surgery Date: 17     Referring Physician: Dr. STEW Wells    Admit Date: 2017    Visit Dx:    ICD-10-CM ICD-9-CM   1. Congestive heart failure, unspecified congestive heart failure chronicity, unspecified congestive heart failure type I50.9 428.0   2. Impaired physical mobility Z74.09 781.99   3. Impaired mobility and ADLs Z74.09 799.89   4. Impaired gait and mobility R26.89 781.2     Patient Active Problem List   Diagnosis   • CKD stage 3 due to type 2 diabetes mellitus   • DM II (diabetes mellitus, type II), controlled   • History of CVA in adulthood   • PVD (peripheral vascular disease)   • Pleural effusion   • History of atrial fibrillation   • Benign essential HTN   • Bradycardia   • Nonrheumatic aortic valve stenosis   • Dyspnea on exertion   • Persistent atrial fibrillation   • Congestive heart failure   • Aortic valve stenosis   • CHF (congestive heart failure)   • Dyslipidemia   • Essential hypertension   • Kidney insufficiency   • History of cerebrovascular accident   • Tobacco dependence syndrome               Adult Rehabilitation Note       17 0935 17 1030 17 1330    Rehab Assessment/Intervention    Discipline physical therapy assistant  -RW occupational therapy assistant  -KD physical therapy assistant  -LN    Document Type therapy note (daily note)  -RW therapy note (daily note)  -KD therapy note (daily note)  -LN    Subjective Information agree to therapy  -RW agree to therapy  -KD complains of;agree to therapy;dyspnea;fatigue   agreed to ther ex in supine,declined eob/oob  -LN    Patient Effort, Rehab Treatment adequate  -RW      Symptoms Noted During/After Treatment  shortness of breath  -KD     Symptoms Noted Comment pt with very limited act tolerance  -RW      Precautions/Limitations  fall precautions  -RW fall precautions  -KD fall precautions  -LN    Recorded by [RW] Doc Davison PTA [KD] MAYCOL Macdonald/L [LN] Harriet River PTA    Vital Signs    Pre Systolic BP Rehab 171  -  -  -LN    Pre Treatment Diastolic BP 75  -RW 83   nsg aware of BP  -KD 62  -LN    Post Systolic BP Rehab 168  -RW  150  -LN    Post Treatment Diastolic BP 71  -RW  60  -LN    Pretreatment Heart Rate (beats/min) 97  -  -KD 82  -LN    Intratreatment Heart Rate (beats/min) 93  -RW      Posttreatment Heart Rate (beats/min)  90  -KD 80  -LN    Pre SpO2 (%) 97  -RW 95  -KD 96  -LN    O2 Delivery Pre Treatment supplemental O2  -RW supplemental O2  -KD supplemental O2  -LN    Intra SpO2 (%)   91  -LN    Post SpO2 (%) 96  -RW 94  -KD 94  -LN    O2 Delivery Post Treatment supplemental O2  -RW supplemental O2  -KD     Pre Patient Position  Sitting  -KD Supine  -LN    Intra Patient Position  Standing  -KD Supine  -LN    Post Patient Position  Sitting  -KD Supine  -LN    Rest Breaks   --   PRN  -KD --   as needed  -LN    Recorded by [RW] Doc Davison PTA [KD] MAYCOL Macdonald/MAYELIN [LN] Harriet River PTA    Pain Assessment    Pain Assessment No/denies pain  -RW No/denies pain  -KD 0-10  -LN    Pain Score  0  -KD 0  -LN    Post Pain Score  0  -KD 0  -LN    Recorded by [RW] Doc Davison PTA [KD] MAYCOL Macdonald/MAYELIN [LN] Harriet River PTA    Cognitive Assessment/Intervention    Current Cognitive/Communication Assessment  functional  -KD     Orientation Status  oriented x 4  -KD oriented x 4  -LN    Follows Commands/Answers Questions  100% of the time  -KD     Personal Safety Interventions  gait belt;nonskid shoes/slippers when out of bed  -KD     Recorded by  [KD] MAYCOL Macdonald/MAYELIN [LN] Harriet River PTA    Bed Mobility, Assessment/Treatment    Bed Mobility, Assistive Device bed rails;head of bed elevated  -RW      Bed Mob, Supine to Sit, [place] contact guard assist  -RW      Recorded by [RW]  Doc Davison, PTA      Transfer Assessment/Treatment    Transfers, Sit-Stand [place] contact guard assist  -RW contact guard assist  -KD     Transfers, Stand-Sit [place] contact guard assist  -RW contact guard assist  -KD     Transfers, Sit-Stand-Sit, Assist Device rolling walker  -RW rolling walker  -KD     Toilet Transfer, [place]  not tested  -KD     Walk-In Shower Transfer, [place]  not tested  -KD     Bathtub Transfer, [place]  not tested  -KD     Recorded by [RW] Doc Davison PTA [KD] MAYCOL Macdonald/L     Gait Assessment/Treatment    Gait, [place] Level minimum assist (75% patient effort);1 person + 1 person to manage equipment  -RW      Gait, Assistive Device rolling walker  -RW      Gait, Distance (Feet) 6   to doorway  -RW      Recorded by [RW] Doc Davison PTA      Upper Body Bathing Assessment/Training    UB Bathing Assess/Train Assistive Device  bath mitt  -KD     UB Bathing Assess/Train, Position  sitting  -KD     UB Bathing Assess/Train, [place] Level  supervision required  -KD     UB Bathing Assess/Train, Impairments  strength decreased   SOA  -KD     Recorded by  [KD] MAYCOL Macdonald/L     Lower Body Bathing Assessment/Training    LB Bathing Assess/Train Assistive Device  bath mitt  -KD     LB Bathing Assess/Train, Position  sitting  -KD     LB Bathing Assess/Train, [place] Level  minimum assist (75% patient effort)  -KD     LB Bathing Assess/Train, Impairments  strength decreased   SOA  -KD     Recorded by  [KD] MAYCOL Macdonald/L     Upper Body Dressing Assessment/Training    UB Dressing Assess/Train, Clothing Type  doffing:;donning:;hospital gown;button up  -KD     UB Dressing Assess/Train, Position  sitting  -KD     UB Dressing Assess/Train, [place]  minimum assist (75% patient effort);moderate assist (50% patient effort)  -KD     UB Dressing Assess/Train, Impairments  strength decreased   SOA  -KD     Recorded by  [KD] Mervat OSWALD  MAYCOL Valentine/L     Lower Body Dressing Assessment/Training    LB Dressing Assess/Train, Clothing Type  doffing:;donning:;slipper socks  -KD     LB Dressing Assess/Train, Position  sitting  -KD     LB Dressing Assess/Train, Rescue  maximum assist (25% patient effort)  -KD     LB Dressing Assess/Train, Impairments  strength decreased   SOA  -KD     LB Dressing Assess/Train, Comment donned clean gown and backup gown at eob  -RW      Recorded by [RW] Doc Davison PTA [KD] MAYCOL Macdonald/L     Grooming Assessment/Training    Grooming Assess/Train, Assistive Device  --   wash face  -KD     Grooming Assess/Train, Position  sitting  -KD     Grooming Assess/Train, Indepen Level  set up required  -KD     Grooming Assess/Train, Impairments  strength decreased   SOA  -KD     Recorded by  [KD] MAYCOL Macdonald/L     Balance Skills Training    Sitting-Level of Assistance  Contact guard  -KD     Sitting-Balance Support  Feet supported;No upper extremity supported  -KD     Sitting-Balance Activities  --   ADL  -KD     Sitting # of Minutes  45  -KD     Standing-Level of Assistance  Contact guard  -KD     Standing Balance # of Minutes  3  -KD     Recorded by  [KD] MAYCOL Macdonald/L     Therapy Exercises    Bilateral Lower Extremities AROM:;10 reps;ankle pumps/circles  -RW  AROM:;10 reps;supine;hip abduction/adduction;heel slides;SAQ   20 reps ankle pumps  -LN    Recorded by [RW] Doc Davison PTA  [LN] Harriet River PTA    Positioning and Restraints    Pre-Treatment Position in bed  -RW sitting in chair/recliner  -KD     Post Treatment Position chair  -RW chair  -KD bed  -LN    In Bed   supine;call light within reach;encouraged to call for assist;exit alarm on  -LN    In Chair reclined;call light within reach;legs elevated  -RW reclined;call light within reach;encouraged to call for assist;exit alarm on  -KD     Recorded by [RW] Doc Davison PTA [KD] MAYCOL Macdonald/MAYELIN [LN] Harriet River PTA       03/03/17 0905          Rehab Assessment/Intervention    Discipline occupational therapy assistant  -KD      Document Type therapy note (daily note)  -KD      Subjective Information agree to therapy  -KD      Symptoms Noted During/After Treatment shortness of breath  -KD      Precautions/Limitations fall precautions  -KD      Recorded by [KD] MAYCOL Macdonald/L      Vital Signs    Pre Systolic BP Rehab 135  -KD      Pre Treatment Diastolic BP 65  -KD      Pretreatment Heart Rate (beats/min) 69  -KD      Posttreatment Heart Rate (beats/min) 70  -KD      Pre SpO2 (%) 97  -KD      O2 Delivery Pre Treatment supplemental O2  -KD      Post SpO2 (%) 92  -KD      O2 Delivery Post Treatment supplemental O2  -KD      Pre Patient Position Supine  -KD      Intra Patient Position Sitting  -KD      Post Patient Position Sitting  -KD      Rest Breaks  --   PRN  -KD      Recorded by [KD] MAYCOL Macdonald/L      Pain Assessment    Pain Assessment No/denies pain   Just soreness  -KD      Recorded by [KD] MAYCOL Macdonald/L      Cognitive Assessment/Intervention    Current Cognitive/Communication Assessment functional  -KD      Orientation Status oriented x 4  -KD      Follows Commands/Answers Questions 100% of the time  -KD      Personal Safety Interventions gait belt;nonskid shoes/slippers when out of bed  -KD      Recorded by [KD] MAYCOL Macdonald/L      Bed Mobility, Assessment/Treatment    Bed Mobility, Assistive Device bed rails;head of bed elevated  -KD      Bed Mobility, Roll Left, [place] conditional independence  -KD      Bed Mob, Supine to Sit, [place] conditional independence  -KD      Bed Mob, Sit to Supine, [place] not tested  -KD      Bed Mob, Sidelying to Sit, [place] conditional independence  -KD      Bed Mob, Sit to Sidelying, [place] not tested  -KD      Bed Mobility, Impairments strength decreased   SOA  -KD      Recorded by [KD] MAYCOL Macdonald/MAYELIN      Transfer  Assessment/Treatment    Transfers, Bed-Chair [place] contact guard assist  -KD      Transfers, Chair-Bed [place] not tested  -KD      Transfers, Bed-Chair-Bed, Assist Device rolling walker   gait belt  -KD      Transfers, Sit-Stand [place] contact guard assist  -KD      Transfers, Stand-Sit [place] contact guard assist  -KD      Transfers, Sit-Stand-Sit, Assist Device rolling walker  -KD      Toilet Transfer, [place] not tested  -KD      Walk-In Shower Transfer, [place] not tested  -KD      Bathtub Transfer, [place] not tested  -KD      Recorded by [KD] OBED Macdonald      Functional Mobility    Functional Mobility- Ind. Level contact guard assist  -KD      Functional Mobility- Device rolling walker  -KD      Functional Mobility-Distance (Feet) 3  -KD      Functional Mobility- Safety Issues supplemental O2  -KD      Recorded by [KD] OBED Macdonald      Upper Body Bathing Assessment/Training    UB Bathing Assess/Train Assistive Device bath mitt  -KD      UB Bathing Assess/Train, Position sitting;edge of bed  -KD      UB Bathing Assess/Train, [place] Level contact guard assist  -KD      UB Bathing Assess/Train, Impairments strength decreased   SOA  -KD      Recorded by [KD] OBED Macdonald      Lower Body Bathing Assessment/Training    LB Bathing Assess/Train Assistive Device bath mitt  -KD      LB Bathing Assess/Train, Position edge of bed;sitting  -KD      LB Bathing Assess/Train, [place] Level moderate assist (50% patient effort)  -KD      LB Bathing Assess/Train, Impairments strength decreased   SOA  -KD      Recorded by [KD] OBED Macdonald      Upper Body Dressing Assessment/Training    UB Dressing Assess/Train, Clothing Type doffing:;donning:;hospital gown  -KD      UB Dressing Assess/Train, Position edge of bed;sitting  -KD      UB Dressing Assess/Train, [place] minimum assist (75% patient effort)  -KD      UB Dressing  Assess/Train, Impairments strength decreased   SOA  -KD      Recorded by [KD] LILIAN MacdonaldA/L      Lower Body Dressing Assessment/Training    LB Dressing Assess/Train, Clothing Type doffing:;donning:;slipper socks  -KD      LB Dressing Assess/Train, Position edge of bed;sitting  -KD      LB Dressing Assess/Train, [place] maximum assist (25% patient effort)  -KD      LB Dressing Assess/Train, Impairments strength decreased   SOA  -KD      Recorded by [KD] MAYCOL Macdonald/L      Toileting Assessment/Training    Toileting Assess/Train, Assistive Device urinal  -KD      Toileting Assess/Train, Position sitting   BSChair  -KD      Toileting Assess/Train, Indepen Level set up required  -KD      Toileting Assess/Train, Impairments strength decreased  -KD      Recorded by [KD] MAYCOL Macdonald/L      Grooming Assessment/Training    Grooming Assess/Train, Assistive Device --   wash mitt  -KD      Grooming Assess/Train, Position sitting  -KD      Grooming Assess/Train, Indepen Level set up required  -KD      Grooming Assess/Train, Impairments strength decreased  -KD      Grooming Assess/Train, Comment Pt needed RB's PRN to complete ADL task.  -KD      Recorded by [KD] MAYCOL Macdonald/L      Balance Skills Training    Sitting-Level of Assistance Close supervision  -KD      Sitting-Balance Support --   In BSChair 2' SOA sitting EOB w/o support  -KD      Sitting-Balance Activities --   ADL  -KD      Sitting # of Minutes 49  -KD      Recorded by [KD] MAYCOL Macdonald/L      Positioning and Restraints    Pre-Treatment Position in bed  -KD      Post Treatment Position chair  -KD      In Chair reclined;sitting;call light within reach;encouraged to call for assist;exit alarm on;notified nsg  -KD      Recorded by [KD] LILIAN MacdonaldA/L        User Key  (r) = Recorded By, (t) = Taken By, (c) = Cosigned By    Initials Name Effective Dates    LN Harriet River, PTA 10/17/16 -     RW Doc Davison, PTA  10/17/16 -     KD Mervat Valentine, SEVERINO/L 10/17/16 -                 IP PT Goals       03/05/17 1020 03/03/17 1330 03/02/17 1123    Transfer Training PT LTG    Transfer Training PT  LTG, Date Goal Reviewed 03/05/17  -RW 03/03/17  -LN 03/02/17  -MP    Transfer Training PT LTG, Outcome goal not met  -RW goal not met  -LN     Transfer Training PT LTG, Reason Goal Not Met progress slower than expected  -RW progress slower than expected  -LN     Gait Training PT STG    Gait Training Goal PT STG, Date Goal Reviewed 03/05/17  -RW 03/03/17  -LN 03/02/17  -MP    Gait Training Goal PT STG, Outcome goal not met  -RW goal not met  -LN     Gait Training Goal PT STG, Reason Goal Not Met  progress slower than expected  -LN     Gait Training PT LTG    Gait Training Goal PT LTG, Date Goal Reviewed 03/05/17  -RW 03/03/17  -LN 03/02/17  -MP    Gait Training Goal PT LTG, Outcome goal not met  -RW goal not met  -LN     Gait Training Goal PT LTG, Reason Goal Not Met progress slower than expected  -RW progress slower than expected  -LN     Stair Training PT LTG    Stair Training Goal PT LTG, Date Goal Reviewed 03/05/17  -RW 03/03/17  -LN 03/02/17  -MP    Stair Training Goal PT LTG, Outcome goal not met  -RW goal not met  -LN     Stair Training Goal PT LTG, Reason Goal Not Met progress slower than expected  -RW progress slower than expected  -LN     Strength Goal PT LTG    Strength Goal PT LTG, Date Goal Reviewed 03/05/17  -RW 03/03/17  -LN 03/02/17  -MP    Strength Goal PT LTG, Outcome goal not met  -RW goal not met  -LN     Strength Goal PT LTG, Reason Goal Not Met progress slower than expected  -RW progress slower than expected  -LN     Patient Education PT LTG    Patient Education PT LTG, Date Goal Reviewed 03/05/17  -RW 03/03/17  -LN 03/02/17  -MP    Patient Education PT LTG Outcome goal not met  -RW goal not met  -LN     Patient Education PT LTG, Reason Goal Not Met progress slower than expected  -RW progress slower than expected  -LN        03/01/17 0927 02/28/17 1439       Transfer Training PT LTG    Transfer Training PT LTG, Date Established  02/28/17  -EZ     Transfer Training PT LTG, Time to Achieve  by discharge  -EZ     Transfer Training PT LTG, [place] Level  conditional independence  -EZ     Transfer Training PT LTG, Assist Device  other (see comments)   AAD  -EZ     Transfer Training PT  LTG, Date Goal Reviewed 03/01/17  -TW      Transfer Training PT LTG, Outcome goal ongoing  -TW goal ongoing  -EZ     Gait Training PT STG    Gait Training Goal PT STG, Date Established  02/28/17  -EZ     Gait Training Goal PT STG, Time to Achieve  3 days  -EZ     Gait Training Goal PT STG, [place] Level  supervision required  -EZ     Gait Training Goal PT STG, Assist Device  other (see comments)   AAD  -EZ     Gait Training Goal PT STG, Distance to Achieve  50  -EZ     Gait Training Goal PT STG, Additional Goal  O2 sats will not drop below 92%  -EZ     Gait Training Goal PT STG, Date Goal Reviewed 03/01/17  -TW      Gait Training Goal PT STG, Outcome goal ongoing  -TW goal ongoing  -EZ     Gait Training PT LTG    Gait Training Goal PT LTG, Date Established  02/28/17  -EZ     Gait Training Goal PT LTG, Time to Achieve  by discharge  -EZ     Gait Training Goal PT LTG, [place] Level  conditional independence  -EZ     Gait Training Goal PT LTG, Assist Device  other (see comments)   AAD  -EZ     Gait Training Goal PT LTG, Distance to Achieve  150  -EZ     Gait Training Goal PT LTG, Date Goal Reviewed 03/01/17  -TW      Gait Training Goal PT LTG, Outcome goal ongoing  -TW goal ongoing  -EZ     Stair Training PT LTG    Stair Training Goal PT LTG, Date Established  02/28/17  -EZ     Stair Training Goal PT LTG, Time to Achieve  by discharge  -EZ     Stair Training Goal PT LTG, Number of Steps  3  -EZ     Stair Training Goal PT LTG, [place] Level  conditional independence  -EZ     Stair Training Goal PT LTG, Assist Device  2 handrails;cane,  quad  -     Stair Training Goal PT LTG, Date Goal Reviewed 03/01/17  -TW      Stair Training Goal PT LTG, Outcome goal ongoing  - goal ongoing  -     Strength Goal PT LTG    Strength Goal PT LTG, Date Established  02/28/17  -     Strength Goal PT LTG, Time to Achieve  by discharge  -     Strength Goal PT LTG, Measure to Achieve  Pt will perform 2 sets of 15 reps or AROM exercises  -     Strength Goal PT LTG, Functional Goal  Pt will progress to standing exercises  -     Strength Goal PT LTG, Date Goal Reviewed 03/01/17  -TW      Strength Goal PT LTG, Outcome goal ongoing  - goal ongoing  -     Patient Education PT LTG    Patient Education PT LTG, Date Established  02/28/17  -     Patient Education PT LTG, Time to Achieve  by discharge  -     Patient Education PT LTG, Education Type  written program;HEP;posture/body mechanics;benefits of activity;home safety  -     Patient Education PT LTG, Date Goal Reviewed 03/01/17  -      Patient Education PT LTG Outcome goal ongoing  - goal ongoing  -       User Key  (r) = Recorded By, (t) = Taken By, (c) = Cosigned By    Initials Name Provider Type    EZ Stone, PT Physical Therapist    LN Harriet River, PTA Physical Therapy Assistant    MUNIR Devine, PTA Physical Therapy Assistant    RW Doc Davison, PTA Physical Therapy Assistant    MP Greg Robison, PT Physical Therapist          Physical Therapy Education     Title: PT OT SLP Therapies (Active)     Topic: Physical Therapy (Active)     Point: Mobility training (Done)    Learning Progress Summary    Learner Readiness Method Response Comment Documented by Status   Patient Acceptance E DU,NR pt benifits of mobilityreviewed RW 03/05/17 1019 Done    Acceptance E NR  LN 03/03/17 1449 Active    Acceptance E VU  ARNOL 03/02/17 1128 Done               Point: Home exercise program (Active)    Learning Progress Summary    Learner Readiness Method Response Comment Documented by Status    Patient Acceptance E NR  LN 03/03/17 1449 Active               Point: Precautions (Active)    Learning Progress Summary    Learner Readiness Method Response Comment Documented by Status   Patient Acceptance E NR  LN 03/03/17 1449 Active    Acceptance E VU  MP 03/02/17 1128 Done                      User Key     Initials Effective Dates Name Provider Type Discipline    LN 10/17/16 -  Harriet River, PTA Physical Therapy Assistant PT    RW 10/17/16 -  Doc Davison, PTA Physical Therapy Assistant PT    MP 10/17/16 -  Greg Robison, PT Physical Therapist PT                    PT Recommendation and Plan  Planned Therapy Interventions: balance training, bed mobility training, gait training, home exercise program, patient/family education, ROM (Range of Motion), stair training, strengthening, transfer training  PT Frequency: per priority policy (5-14 times per week)  Plan of Care Review  Plan Of Care Reviewed With: patient  Progress: progress toward functional goals is gradual  Outcome Summary/Follow up Plan: pt verbalizing he needs to get stronger but funtional abilities are limited . mobilize as able.          Outcome Measures       03/05/17 0935 03/04/17 1030 03/03/17 1330    How much help from another person do you currently need...    Turning from your back to your side while in flat bed without using bedrails? 4  -RW  4  -LN    Moving from lying on back to sitting on the side of a flat bed without bedrails? 4  -RW  4  -LN    Moving to and from a bed to a chair (including a wheelchair)? 3  -RW  3  -LN    Standing up from a chair using your arms (e.g., wheelchair, bedside chair)? 3  -RW  3  -LN    Climbing 3-5 steps with a railing? 2  -RW  3  -LN    To walk in hospital room? 3  -RW  3  -LN    AM-PAC 6 Clicks Score 19  -RW  20  -LN    How much help from another is currently needed...    Putting on and taking off regular lower body clothing?  2  -KD     Bathing (including washing, rinsing, and drying)  2  -KD      Toileting (which includes using toilet bed pan or urinal)  2  -KD     Putting on and taking off regular upper body clothing  3  -KD     Taking care of personal grooming (such as brushing teeth)  3  -KD     Eating meals  4  -KD     Score  16  -KD     Functional Assessment    Outcome Measure Options   AM-PAC 6 Clicks Basic Mobility (PT)  -LN      03/03/17 0905 03/02/17 1100       How much help from another person do you currently need...    Turning from your back to your side while in flat bed without using bedrails?  4  -MP     Moving from lying on back to sitting on the side of a flat bed without bedrails?  4  -MP     Moving to and from a bed to a chair (including a wheelchair)?  3  -MP     Standing up from a chair using your arms (e.g., wheelchair, bedside chair)?  3  -MP     Climbing 3-5 steps with a railing?  3  -MP     To walk in hospital room?  3  -MP     AM-PAC 6 Clicks Score  20  -MP     How much help from another is currently needed...    Putting on and taking off regular lower body clothing? 2  -KD      Bathing (including washing, rinsing, and drying) 2  -KD      Toileting (which includes using toilet bed pan or urinal) 2  -KD      Putting on and taking off regular upper body clothing 2  -KD      Taking care of personal grooming (such as brushing teeth) 3  -KD      Eating meals 3  -KD      Score 14  -KD      Functional Assessment    Outcome Measure Options  AM-PAC 6 Clicks Basic Mobility (PT)  -MP       User Key  (r) = Recorded By, (t) = Taken By, (c) = Cosigned By    Initials Name Provider Type    LN Harriet River PTA Physical Therapy Assistant    LAN Davison PTA Physical Therapy Assistant    OBED Miranda Occupational Therapy Assistant    ARNOL Robison, PT Physical Therapist           Time Calculation:         PT Charges       03/05/17 1029          Time Calculation    Start Time 0935  -RW      Stop Time 1001  -RW      Time Calculation (min) 26 min  -RW      Time Calculation- PT     Total Timed Code Minutes- PT 26 minute(s)  -LAN        User Key  (r) = Recorded By, (t) = Taken By, (c) = Cosigned By    Initials Name Provider Type    RW Doc Davison PTA Physical Therapy Assistant          Therapy Charges for Today     Code Description Service Date Service Provider Modifiers Qty    [id number]  PT THERAPEUTIC ACT EA 15 MIN 3/5/2017 Doc Davison PTA GP 2          PT G-Codes  PT Professional Judgement Used?: Yes  Outcome Measure Options: AM-PAC 6 Clicks Basic Mobility (PT)  Score: 19  Functional Limitation: Mobility: Walking and moving around  Mobility: Walking and Moving Around Current Status (): At least 1 percent but less than 20 percent impaired, limited or restricted    Doc Davison PTA  3/5/2017

## 2017-03-05 NOTE — PLAN OF CARE
Problem: Patient Care Overview (Adult)  Goal: Plan of Care Review  Outcome: Ongoing (interventions implemented as appropriate)    03/05/17 1020   Coping/Psychosocial Response Interventions   Plan Of Care Reviewed With patient   Patient Care Overview   Progress progress toward functional goals is gradual   Outcome Evaluation   Outcome Summary/Follow up Plan pt verbalizing he needs to get stronger but funtional abilities are limited . mobilize as able.       Goal: Discharge Needs Assessment  Outcome: Ongoing (interventions implemented as appropriate)    03/05/17 1020   Discharge Needs Assessment   Discharge Planning Comments [facility]         Problem: Inpatient Physical Therapy  Goal: Transfer Training Goal 1 LTG- PT  Outcome: Ongoing (interventions implemented as appropriate)    02/28/17 1439 03/05/17 1020   Transfer Training PT LTG   Transfer Training PT LTG, Date Established 02/28/17 --    Transfer Training PT LTG, Time to Achieve by discharge --    Transfer Training PT LTG, [place] Level conditional independence --    Transfer Training PT LTG, Assist Device other (see comments)  (AAD) --    Transfer Training PT LTG, Date Goal Reviewed --  03/05/17   Transfer Training PT LTG, Outcome --  goal not met   Transfer Training PT LTG, Reason Goal Not Met --  progress slower than expected       Goal: Gait Training Goal STG- PT  Outcome: Ongoing (interventions implemented as appropriate)    02/28/17 1439 03/03/17 1330 03/05/17 1020   Gait Training PT STG   Gait Training Goal PT STG, Date Established 02/28/17 --  --    Gait Training Goal PT STG, Time to Achieve 3 days --  --    Gait Training Goal PT STG, [place] Level supervision required --  --    Gait Training Goal PT STG, Assist Device other (see comments)  (AAD) --  --    Gait Training Goal PT STG, Distance to Achieve 50 --  --    Gait Training Goal PT STG, Additional Goal O2 sats will not drop below 92% --  --    Gait Training Goal PT  STG, Date Goal Reviewed --  --  03/05/17   Gait Training Goal PT STG, Outcome --  --  goal not met   Gait Training Goal PT STG, Reason Goal Not Met --  progress slower than expected --        Goal: Gait Training Goal LTG- PT  Outcome: Ongoing (interventions implemented as appropriate)    02/28/17 1439 03/05/17 1020   Gait Training PT LTG   Gait Training Goal PT LTG, Date Established 02/28/17 --    Gait Training Goal PT LTG, Time to Achieve by discharge --    Gait Training Goal PT LTG, [place] Level conditional independence --    Gait Training Goal PT LTG, Assist Device other (see comments)  (AAD) --    Gait Training Goal PT LTG, Distance to Achieve 150 --    Gait Training Goal PT LTG, Date Goal Reviewed --  03/05/17   Gait Training Goal PT LTG, Outcome --  goal not met   Gait Training Goal PT LTG, Reason Goal Not Met --  progress slower than expected       Goal: Stair Training Goal LTG- PT  Outcome: Ongoing (interventions implemented as appropriate)    02/28/17 1439 03/05/17 1020   Stair Training PT LTG   Stair Training Goal PT LTG, Date Established 02/28/17 --    Stair Training Goal PT LTG, Time to Achieve by discharge --    Stair Training Goal PT LTG, Number of Steps 3 --    Stair Training Goal PT LTG, [place] Level conditional independence --    Stair Training Goal PT LTG, Assist Device 2 handrails;cane, quad --    Stair Training Goal PT LTG, Date Goal Reviewed --  03/05/17   Stair Training Goal PT LTG, Outcome --  goal not met   Stair Training Goal PT LTG, Reason Goal Not Met --  progress slower than expected       Goal: Strength Goal LTG- PT  Outcome: Ongoing (interventions implemented as appropriate)    02/28/17 1439 03/05/17 1020   Strength Goal PT LTG   Strength Goal PT LTG, Date Established 02/28/17 --    Strength Goal PT LTG, Time to Achieve by discharge --    Strength Goal PT LTG, Measure to Achieve Pt will perform 2 sets of 15 reps or AROM exercises --    Strength Goal PT LTG, Functional Goal  Pt will progress to standing exercises --    Strength Goal PT LTG, Date Goal Reviewed --  03/05/17   Strength Goal PT LTG, Outcome --  goal not met   Strength Goal PT LTG, Reason Goal Not Met --  progress slower than expected       Goal: Patient Education Goal LTG- PT  Outcome: Ongoing (interventions implemented as appropriate)    02/28/17 1439 03/05/17 1020   Patient Education PT LTG   Patient Education PT LTG, Date Established 02/28/17 --    Patient Education PT LTG, Time to Achieve by discharge --    Patient Education PT LTG, Education Type written program;HEP;posture/body mechanics;benefits of activity;home safety --    Patient Education PT LTG, Date Goal Reviewed --  03/05/17   Patient Education PT LTG Outcome --  goal not met   Patient Education PT LTG, Reason Goal Not Met --  progress slower than expected

## 2017-03-05 NOTE — PROGRESS NOTES
"   LOS: 12 days    Patient Care Team:  Klaus Faria MD as PCP - General    Subjective:  The patient states he is doing better no marked soa this morning. He has soa on exertion . alos weak with physical therapy    Review of Systems:   Review of Systems   All other systems reviewed and are negative.      Objective    Lungs few crackles are heard at the bases.  Heart regular rate and rhythm with systolic murmur at the left sternal border.  Abdomen soft and tender distended no hepatojugular reflux.   Extremities 2+ pitting edema bilaterally    Vital Signs  Temp:  [97.1 °F (36.2 °C)-97.7 °F (36.5 °C)] 97.7 °F (36.5 °C)  Heart Rate:  [] 80  Resp:  [20-25] 20  BP: (144-161)/(65-72) 160/72  FiO2 (%):  [35 %] 35 %    Flowsheet Rows         First Filed Value    Admission Height  66\" (167.6 cm) Documented at 02/21/2017 1119    Admission Weight  160 lb (72.6 kg) Documented at 02/21/2017 1119             I/O last 3 completed shifts:  In: -   Out: 400 [Urine:400]    Intake/Output Summary (Last 24 hours) at 03/05/17 1003  Last data filed at 03/04/17 1359   Gross per 24 hour   Intake      0 ml   Output    400 ml   Net   -400 ml       Physical Exam:  Physical Exam   Constitutional: He appears well-developed.   Eyes: Pupils are equal, round, and reactive to light.   Cardiovascular: Normal rate and regular rhythm.    Pulmonary/Chest: Effort normal. He has rales.   Abdominal: Soft. Bowel sounds are normal.        Results Review:      Results from last 7 days  Lab Units 03/05/17  0534 03/05/17  0500 03/04/17  0611 03/03/17  0652   SODIUM mmol/L 138  --  133* 134*   SODIUM, ARTERIAL mmol/L  --  135.4*  --   --    POTASSIUM mmol/L 3.7  --  4.1 3.9   CHLORIDE mmol/L 101  --  98 99   TOTAL CO2 mmol/L 26.0  --  26.0 26.0   BUN mg/dL 101*  --  103* 92*   CREATININE mg/dL 2.15*  --  2.25* 2.07*   CALCIUM mg/dL 8.6  --  8.6 8.8   GLUCOSE mg/dL 115*  --  273* 192*   GLUCOSE, ARTERIAL mmol/L  --  119  --   --        Estimated " Creatinine Clearance: 28.6 mL/min (by C-G formula based on Cr of 2.15).      Results from last 7 days  Lab Units 03/05/17  0534 03/04/17  0611 03/03/17  0652   MAGNESIUM mg/dL 2.6* 2.6* 2.6*               Results from last 7 days  Lab Units 03/05/17  0534 03/04/17  0611 03/03/17  0652 03/02/17  0527 03/01/17  0634   WBC 10*3/mm3 7.82 8.79 9.73 8.96 9.99*   HEMOGLOBIN g/dL 11.7* 11.5* 12.0* 12.4* 12.5*   PLATELETS 10*3/mm3 171 171 193 201 207         Results from last 7 days  Lab Units 03/05/17  0534 03/04/17  0611 03/03/17  0652 03/02/17  0527 03/01/17  1234   INR  2.60* 2.80* 3.37* 4.08* 2.87*         Imaging Results (last 24 hours)     ** No results found for the last 24 hours. **            Medication Review:   Current Facility-Administered Medications   Medication Dose Route Frequency Provider Last Rate Last Dose   • acetaminophen (TYLENOL) tablet 650 mg  650 mg Oral Q6H PRN Adriana Wells MD   650 mg at 02/28/17 1450   • albuterol (PROVENTIL HFA;VENTOLIN HFA) inhaler 2 puff  2 puff Inhalation Q4H PRN Adriana Wells MD       • albuterol (PROVENTIL) nebulizer solution 0.083% 2.5 mg/3mL  2.5 mg Nebulization Q4H PRN LONNIE Leon       • atorvastatin (LIPITOR) tablet 20 mg  20 mg Oral Daily Adriana Wells MD   20 mg at 03/05/17 0846   • budesonide (PULMICORT) nebulizer solution 0.5 mg  0.5 mg Nebulization BID - RT Zackary Anand MD   0.5 mg at 03/05/17 0651   • cholecalciferol (VITAMIN D3) tablet 1,000 Units  1,000 Units Oral Daily Adriana Wells MD   1,000 Units at 03/05/17 0846   • clopidogrel (PLAVIX) tablet 75 mg  75 mg Oral Daily Adriana Wells MD   75 mg at 03/05/17 0846   • dextrose (D50W) solution 25 g  25 g Intravenous Q15 Min PRN Adriana Wells MD       • dextrose (D50W) solution 50 mL  50 mL Intravenous Q1H PRN Stephani Bingham MD   50 mL at 02/22/17 0645   • dextrose (GLUTOSE) oral gel 15 g  15 g Oral Q15 Min PRN Adriana Wells MD       •  diltiaZEM CD (CARDIZEM CD) 24 hr capsule 240 mg  240 mg Oral Daily Adriana Wells MD   240 mg at 03/05/17 0846   • escitalopram (LEXAPRO) tablet 10 mg  10 mg Oral Daily Adriana Wells MD   10 mg at 03/05/17 0845   • furosemide (LASIX) tablet 40 mg  40 mg Oral BID Richi Hayward MD   40 mg at 03/05/17 0845   • glipiZIDE (GLUCOTROL) 24 hr tablet 2.5 mg  2.5 mg Oral Nightly Adriana Wells MD   2.5 mg at 03/04/17 2059   • glucagon (human recombinant) (GLUCAGEN DIAGNOSTIC) injection 1 mg  1 mg Subcutaneous Q15 Min PRN Adriana Wells MD       • hydrALAZINE (APRESOLINE) tablet 100 mg  100 mg Oral Q8H Richi Hayward MD   100 mg at 03/05/17 0845   • insulin aspart (novoLOG) injection 0-9 Units  0-9 Units Subcutaneous 4x Daily AC & at Bedtime Adriana Wells MD   6 Units at 03/04/17 2051   • insulin detemir (LEVEMIR) injection 15 Units  15 Units Subcutaneous Nightly Zackary Girish Anand MD   15 Units at 03/04/17 2053   • ipratropium (ATROVENT) nebulizer solution 0.5 mg  0.5 mg Nebulization 4x Daily - RT Zackarybabs Anand MD   0.5 mg at 03/05/17 0651   • isosorbide mononitrate (IMDUR) 24 hr tablet 60 mg  60 mg Oral Q24H Zackary Girish Anand MD   60 mg at 03/05/17 0846   • LORazepam (ATIVAN) injection 0.5 mg  0.5 mg Intravenous Q4H PRN Jimbo Shelley MD   0.5 mg at 03/04/17 2100   • methylPREDNISolone sodium succinate (SOLU-Medrol) injection 40 mg  40 mg Intravenous Q12H Adriana Wells MD   40 mg at 03/05/17 0415   • pantoprazole (PROTONIX) EC tablet 40 mg  40 mg Oral Q AM Harshul Az Wells MD   40 mg at 03/05/17 0847   • Pharmacy to dose warfarin   Does not apply Continuous PRN Zackary Anand MD       • phenol (CHLORASEPTIC) 1.4 % liquid 2 spray  2 spray Mouth/Throat Q2H PRN Fabiana Wells MD       • sodium chloride 0.9 % flush 10 mL  10 mL Intravenous PRN David Henderson MD   10 mL at 03/03/17 6762   • warfarin (COUMADIN) tablet 4 mg  4 mg Oral Daily Fabiana Wells,  MD   4 mg at 03/04/17 3817       Assessment/Plan    1 chronic kidney disease stage III with renal function at baseline. Has high bun than cr sec to steroid +/- lasix  2 congestive heart failure with significant volume overload. 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 Add extra 20mg iv lasix. Taper steroid  2. Continue with fluid and salt restriction.  3. ct same anti htn    Principal Problem:    CHF (congestive heart failure)  Active Problems:    Nonrheumatic aortic valve stenosis    Persistent atrial fibrillation    Congestive heart failure    Aortic valve stenosis    Dyslipidemia    Essential hypertension    Kidney insufficiency    History of cerebrovascular accident    Tobacco dependence syndrome              Richi Hayward MD  03/05/17  10:03 AM

## 2017-03-05 NOTE — PLAN OF CARE
Problem: Patient Care Overview (Adult)  Goal: Plan of Care Review  Outcome: Ongoing (interventions implemented as appropriate)    03/03/17 2010 03/04/17 1030   Coping/Psychosocial Response Interventions   Plan Of Care Reviewed With patient --    Patient Care Overview   Progress --  improving   Outcome Evaluation   Outcome Summary/Follow up Plan --  Pt was able to complete ADL: with 02 remaining above 90%, however pt needed rb's PRN.       Goal: Adult Individualization and Mutuality  Outcome: Ongoing (interventions implemented as appropriate)    Problem: Cardiac: Heart Failure (Adult)  Goal: Signs and Symptoms of Listed Potential Problems Will be Absent or Manageable (Cardiac: Heart Failure)  Outcome: Ongoing (interventions implemented as appropriate)

## 2017-03-06 NOTE — PROGRESS NOTES
"Anticoagulation by Pharmacy - Warfarin    Kevin Henning is a 76 y.o.male  [Ht: 65.98\" (167.6 cm); Wt: 168 lb (76.2 kg)] on Warfarin 4 mg PO  for indication of a fib.    Goal INR: 2-3  Today's INR:   Lab Results   Component Value Date    INR 3.76 (H) 03/06/2017         Lab Results   Component Value Date    INR 3.76 (H) 03/06/2017    INR 2.60 (H) 03/05/2017    INR 2.80 (H) 03/04/2017    PROTIME 35.7 (H) 03/06/2017    PROTIME 27.1 (H) 03/05/2017    PROTIME 28.6 (H) 03/04/2017     Lab Results   Component Value Date    HGB 11.3 (L) 03/06/2017    HGB 11.7 (L) 03/05/2017    HGB 11.5 (L) 03/04/2017     Lab Results   Component Value Date    HCT 33.3 (L) 03/06/2017    HCT 34.3 (L) 03/05/2017    HCT 34.1 (L) 03/04/2017     Assessment/Plan:  Reviewed above labs. INR is 3.76.  INR is supra therapeutic. Pt did receive dose of warfarin last night. No changes in medication list. Will iHOLD current dose of warfarin. Rx will continue to follow and adjust dose accordingly.      Marychuy Horne [facility]  03/06/17 11:57 AM     "

## 2017-03-06 NOTE — PLAN OF CARE
Problem: Cardiac: Heart Failure (Adult)  Goal: Signs and Symptoms of Listed Potential Problems Will be Absent or Manageable (Cardiac: Heart Failure)    03/06/17 0632   Cardiac: Heart Failure   Problems Assessed (Heart Failure) all   Problems Present (Heart Failure) decreased quality of life;respiratory compromise;functional decline/self-care deficit

## 2017-03-06 NOTE — PROGRESS NOTES
"   LOS: 13 days    Patient Care Team:  Klaus Faria MD as PCP - General    Subjective:  He is more confused today and still soa.     Review of Systems:   Review of Systems   Respiratory: Positive for shortness of breath.    Psychiatric/Behavioral: Positive for altered mental status.   All other systems reviewed and are negative.      Objective    Lungs few crackles are heard at the bases.  Heart regular rate and rhythm with systolic murmur at the left sternal border.  Abdomen soft and tender distended no hepatojugular reflux.   Extremities 2+ pitting edema bilaterally    Vital Signs  Temp:  [96.3 °F (35.7 °C)-97.8 °F (36.6 °C)] 96.3 °F (35.7 °C)  Heart Rate:  [65-82] 75  Resp:  [18-22] 20  BP: (149-185)/(67-81) 180/81    Flowsheet Rows         First Filed Value    Admission Height  66\" (167.6 cm) Documented at 02/21/2017 1119    Admission Weight  160 lb (72.6 kg) Documented at 02/21/2017 1119           No intake or output data in the 24 hours ending 03/06/17 1025    Physical Exam:  Physical Exam   Constitutional: He appears well-developed.   Eyes: Pupils are equal, round, and reactive to light.   Cardiovascular: Normal rate and regular rhythm.    Pulmonary/Chest: Effort normal. He has rales.   Abdominal: Soft. Bowel sounds are normal.        Results Review:      Results from last 7 days  Lab Units 03/06/17  0636 03/05/17  0534 03/05/17  0500 03/04/17  0611   SODIUM mmol/L 137 138  --  133*   SODIUM, ARTERIAL mmol/L  --   --  135.4*  --    POTASSIUM mmol/L 3.7 3.7  --  4.1   CHLORIDE mmol/L 103 101  --  98   TOTAL CO2 mmol/L 28.0 26.0  --  26.0   BUN mg/dL 103* 101*  --  103*   CREATININE mg/dL 2.21* 2.15*  --  2.25*   CALCIUM mg/dL 8.6 8.6  --  8.6   BILIRUBIN mg/dL 0.6  --   --   --    ALK PHOS U/L 95  --   --   --    ALT (SGPT) U/L 97*  --   --   --    AST (SGOT) U/L 37  --   --   --    GLUCOSE mg/dL 95 115*  --  273*   GLUCOSE, ARTERIAL mmol/L  --   --  119  --        Estimated Creatinine Clearance: 25.3 " mL/min (by C-G formula based on Cr of 2.21).      Results from last 7 days  Lab Units 03/06/17  0636 03/05/17  0534 03/04/17  0611   MAGNESIUM mg/dL 2.5* 2.6* 2.6*               Results from last 7 days  Lab Units 03/06/17  0636 03/05/17  0534 03/04/17  0611 03/03/17  0652 03/02/17  0527   WBC 10*3/mm3 9.83* 7.82 8.79 9.73 8.96   HEMOGLOBIN g/dL 11.3* 11.7* 11.5* 12.0* 12.4*   PLATELETS 10*3/mm3 159 171 171 193 201         Results from last 7 days  Lab Units 03/06/17  0636 03/05/17  0534 03/04/17  0611 03/03/17  0652 03/02/17  0527   INR  3.76* 2.60* 2.80* 3.37* 4.08*         Imaging Results (last 24 hours)     ** No results found for the last 24 hours. **            Medication Review:   Current Facility-Administered Medications   Medication Dose Route Frequency Provider Last Rate Last Dose   • acetaminophen (TYLENOL) tablet 650 mg  650 mg Oral Q6H PRN Adriana Wells MD   650 mg at 03/05/17 1136   • albuterol (PROVENTIL HFA;VENTOLIN HFA) inhaler 2 puff  2 puff Inhalation Q4H PRN Adriana Wells MD       • albuterol (PROVENTIL) nebulizer solution 0.083% 2.5 mg/3mL  2.5 mg Nebulization Q4H PRN LONNIE Leon       • atorvastatin (LIPITOR) tablet 20 mg  20 mg Oral Daily Adriana Wells MD   20 mg at 03/06/17 0843   • budesonide (PULMICORT) nebulizer solution 0.5 mg  0.5 mg Nebulization BID - RT Zackary Anand MD   0.5 mg at 03/06/17 0743   • cholecalciferol (VITAMIN D3) tablet 1,000 Units  1,000 Units Oral Daily Adriana Wells MD   1,000 Units at 03/06/17 0842   • clopidogrel (PLAVIX) tablet 75 mg  75 mg Oral Daily Adriana Wells MD   75 mg at 03/06/17 0842   • dextrose (D50W) solution 25 g  25 g Intravenous Q15 Min PRN Adriana Wells MD       • dextrose (D50W) solution 50 mL  50 mL Intravenous Q1H PRN Stephani Bingham MD   50 mL at 02/22/17 0645   • dextrose (GLUTOSE) oral gel 15 g  15 g Oral Q15 Min PRN Adriana Wells MD       • diltiaZEM CD (CARDIZEM CD) 24 hr  capsule 240 mg  240 mg Oral Daily Adriana Wells MD   240 mg at 03/06/17 0842   • escitalopram (LEXAPRO) tablet 10 mg  10 mg Oral Daily Adriana Wells MD   10 mg at 03/06/17 0843   • furosemide (LASIX) 100 mg, albumin human 25 % 100 mL 110 mL infusion  5 mL/hr Intravenous Continuous Richi Hayward MD       • glipiZIDE (GLUCOTROL) 24 hr tablet 2.5 mg  2.5 mg Oral Nightly Adriana Wells MD   2.5 mg at 03/05/17 2035   • glucagon (human recombinant) (GLUCAGEN DIAGNOSTIC) injection 1 mg  1 mg Subcutaneous Q15 Min PRN Adriana Wells MD       • hydrALAZINE (APRESOLINE) tablet 100 mg  100 mg Oral Q6H Sd Taylor MD   100 mg at 03/06/17 0843   • insulin aspart (novoLOG) injection 0-9 Units  0-9 Units Subcutaneous 4x Daily AC & at Bedtime Adriana Wells MD   7 Units at 03/05/17 1734   • insulin detemir (LEVEMIR) injection 15 Units  15 Units Subcutaneous Nightly Zackary Anand MD   15 Units at 03/05/17 2100   • ipratropium (ATROVENT) nebulizer solution 0.5 mg  0.5 mg Nebulization 4x Daily - RT Zackary Anand MD   0.5 mg at 03/06/17 0739   • isosorbide mononitrate (IMDUR) 24 hr tablet 60 mg  60 mg Oral Q24H Zackary Anand MD   60 mg at 03/06/17 0842   • LORazepam (ATIVAN) injection 0.5 mg  0.5 mg Intravenous Q4H PRN Jimbo Shelley MD   0.5 mg at 03/05/17 2350   • methylPREDNISolone sodium succinate (SOLU-Medrol) injection 20 mg  20 mg Intravenous Q12H Richi Hayward MD   20 mg at 03/06/17 0234   • pantoprazole (PROTONIX) EC tablet 40 mg  40 mg Oral Q AM Fabiana Wells MD   40 mg at 03/05/17 0847   • Pharmacy to dose warfarin   Does not apply Continuous PRN Zackary Anand MD       • phenol (CHLORASEPTIC) 1.4 % liquid 2 spray  2 spray Mouth/Throat Q2H PRN Fabiana Wells MD       • sodium chloride 0.9 % flush 10 mL  10 mL Intravenous PRN David Henderson MD   10 mL at 03/03/17 2125   • warfarin (COUMADIN) tablet 4 mg  4 mg Oral Daily Fabiana Wells MD   4  mg at 03/05/17 1733       Assessment/Plan    1 chronic kidney disease stage III now worsening uremia and slightly high creatinine. Now has mental status change due to med vs uremia.   2 congestive heart failure with significant volume overload. 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 Keep at 5mg/hr  2. Continue with fluid and salt restriction.  3. ct same anti htn  4. Long discussion with his Son explain the medical complexity - worsening uremia fluid overload chf and poor pulmonary reserve. He will need hd to help with uremia and fluid overload but also he may not tolerate it due to his age current medical commodities. Son will dw other family and let us know.    Principal Problem:    CHF (congestive heart failure)  Active Problems:    Nonrheumatic aortic valve stenosis    Persistent atrial fibrillation    Congestive heart failure    Aortic valve stenosis    Dyslipidemia    Essential hypertension    Kidney insufficiency    History of cerebrovascular accident    Tobacco dependence syndrome              Richi Hayward MD  03/06/17  10:25 AM

## 2017-03-06 NOTE — PROGRESS NOTES
Acute Care - Occupational Therapy Treatment Note  [facility]     Patient Name: Kevin Henning  : 1940  MRN: [id number]  Today's Date: 3/6/2017  Onset of Illness/Injury or Date of Surgery Date: 17  Date of Referral to OT: 17  Referring Physician: Dr. STEW Wells      Admit Date: 2017    Visit Dx:     ICD-10-CM ICD-9-CM   1. Congestive heart failure, unspecified congestive heart failure chronicity, unspecified congestive heart failure type I50.9 428.0   2. Impaired physical mobility Z74.09 781.99   3. Impaired mobility and ADLs Z74.09 799.89   4. Impaired gait and mobility R26.89 781.2     Patient Active Problem List   Diagnosis   • CKD stage 3 due to type 2 diabetes mellitus   • DM II (diabetes mellitus, type II), controlled   • History of CVA in adulthood   • PVD (peripheral vascular disease)   • Pleural effusion   • History of atrial fibrillation   • Benign essential HTN   • Bradycardia   • Nonrheumatic aortic valve stenosis   • Dyspnea on exertion   • Persistent atrial fibrillation   • Congestive heart failure   • Aortic valve stenosis   • CHF (congestive heart failure)   • Dyslipidemia   • Essential hypertension   • Kidney insufficiency   • History of cerebrovascular accident   • Tobacco dependence syndrome             Adult Rehabilitation Note       17 1110 17 0935 17 1030    Rehab Assessment/Intervention    Discipline occupational therapy assistant  -JH physical therapy assistant  -RW occupational therapy assistant  -KD    Document Type therapy note (daily note)  - therapy note (daily note)  -RW therapy note (daily note)  -KD    Subjective Information weakness;fatigue;pain;swelling  - agree to therapy  -RW agree to therapy  -KD    Patient Effort, Rehab Treatment poor  - adequate  -RW     Symptoms Noted During/After Treatment fatigue;increased pain;shortness of breath  -  shortness of breath  -KD    Symptoms Noted Comment  pt with very limited act tolerance   -RW     Precautions/Limitations fall precautions  - fall precautions  - fall precautions  -KD    Recorded by [] MAYCOL Banks/MAYELIN [RW] Doc Davison PTA [KD] MAYCOL Macdonald/L    Vital Signs    Pre Systolic BP Rehab  171  -  -KD    Pre Treatment Diastolic BP  75  -RW 83   nsg aware of BP  -KD    Post Systolic BP Rehab  168  -RW     Post Treatment Diastolic BP  71  -RW     Pretreatment Heart Rate (beats/min)  97  -  -KD    Intratreatment Heart Rate (beats/min)  93  -RW     Posttreatment Heart Rate (beats/min)   90  -KD    Pre SpO2 (%)  97  -RW 95  -KD    O2 Delivery Pre Treatment  supplemental O2  -RW supplemental O2  -KD    Post SpO2 (%)  96  -RW 94  -KD    O2 Delivery Post Treatment  supplemental O2  -RW supplemental O2  -KD    Pre Patient Position   Sitting  -KD    Intra Patient Position   Standing  -KD    Post Patient Position   Sitting  -KD    Rest Breaks    --   PRN  -KD    Recorded by  [] Doc Davison PTA [KD] LILIAN MacdonaldA/L    Pain Assessment    Pain Assessment 0-10  - No/denies pain  - No/denies pain  -    Pain Score 8  -  0  -KD    Post Pain Score 8  -  0  -KD    Pain Type Acute pain;Chronic pain  -      Pain Location Generalized  -      Pain Frequency Constant/continuous  -      Recorded by [] MAYCOL Banks/L [RW] Doc Davison PTA [KD] LILIAN MacdonaldA/L    Cognitive Assessment/Intervention    Current Cognitive/Communication Assessment   functional  -    Orientation Status   oriented x 4  -KD    Follows Commands/Answers Questions   100% of the time  -    Personal Safety Interventions   gait belt;nonskid shoes/slippers when out of bed  -KD    Recorded by   [KD] LILIAN MacdonaldA/L    Bed Mobility, Assessment/Treatment    Bed Mobility, Assistive Device bed rails;draw sheet  - bed rails;head of bed elevated  -     Bed Mobility, Roll Left, Graham dependent (less than 25% patient effort)  -      Bed Mobility, Roll  Right, [place] dependent (less than 25% patient effort)  -      Bed Mob, Supine to Sit, [place]s  contact guard assist  -RW     Recorded by [JH] MAYCOL Banks/L [RW] Doc Davison, PTA     Transfer Assessment/Treatment    Transfers, Sit-Stand Rooks  contact guard assist  -RW contact guard assist  -KD    Transfers, Stand-Sit [place]ooks  contact guard assist  -RW contact guard assist  -KD    Transfers, Sit-Stand-Sit, Assist Device  rolling walker  -RW rolling walker  -KD    Toilet Transfer, Rooks   not tested  -KD    Walk-In Shower Transfer, Rooks   not tested  -KD    Bathtub Transfer, Rooks   not tested  -KD    Recorded by  [RW] Doc Davison, PTA [KD] LILIAN MacdonaldA/L    Gait Assessment/Treatment    Gait, [place]ooks Level  minimum assist (75% patient effort);1 person + 1 person to manage equipment  -RW     Gait, Assistive Device  rolling walker  -RW     Gait, Distance (Feet)  6   to doorway  -RW     Recorded by  [RW] Doc Davison PTA     Upper Body Bathing Assessment/Training    UB Bathing Assess/Train Assistive Device   bath mitt  -KD    UB Bathing Assess/Train, Position   sitting  -KD    UB Bathing Assess/Train, [place] Level   supervision required  -KD    UB Bathing Assess/Train, Impairments   strength decreased   SOA  -KD    Recorded by   [KD] LILIAN MacdonaldA/L    Lower Body Bathing Assessment/Training    LB Bathing Assess/Train Assistive Device bath mitt  -JH  bath mitt  -KD    LB Bathing Assess/Train, Position supine  -  sitting  -KD    LB Bathing Assess/Train, [place] Level dependent (less than 25% patient effort)  -  minimum assist (75% patient effort)  -KD    LB Bathing Assess/Train, Impairments decreased flexibility;ROM decreased;strength decreased;pain  -  strength decreased   SOA  -KD    Recorded by [] LILIAN BanksA/L  [KD] LILIAN MacdonaldA/L    Upper Body Dressing Assessment/Training    UB Dressing  Assess/Train, Clothing Type   doffing:;donning:;hospital gown;button up  -KD    UB Dressing Assess/Train, Position   sitting  -KD    UB Dressing Assess/Train, Early   minimum assist (75% patient effort);moderate assist (50% patient effort)  -KD    UB Dressing Assess/Train, Impairments   strength decreased   SOA  -KD    Recorded by   [KD] LILIAN MacdonaldA/L    Lower Body Dressing Assessment/Training    LB Dressing Assess/Train, Clothing Type   doffing:;donning:;slipper socks  -KD    LB Dressing Assess/Train, Position   sitting  -KD    LB Dressing Assess/Train, Early   maximum assist (25% patient effort)  -KD    LB Dressing Assess/Train, Impairments   strength decreased   SOA  -KD    LB Dressing Assess/Train, Comment  donned clean gown and backup gown at eob  -RW     Recorded by  [RW] Doc Davison, PTA [KD] LILIAN MacdonaldA/L    Toileting Assessment/Training    Toileting Assess/Train, Assistive Device other (see comments)   pt incontinent   -JH      Toileting Assess/Train, Position supine  -JH      Toileting Assess/Train, Indepen Level dependent (less than 25% patient effort)  -      Toileting Assess/Train, Impairments decreased flexibility;ROM decreased;pain  -      Recorded by [] MAYCOL Banks/L      Grooming Assessment/Training    Grooming Assess/Train, Assistive Device   --   wash face  -KD    Grooming Assess/Train, Position   sitting  -KD    Grooming Assess/Train, Indepen Level   set up required  -KD    Grooming Assess/Train, Impairments   strength decreased   SOA  -KD    Recorded by   [KD] MAYCOL Macdonald/L    Balance Skills Training    Sitting-Level of Assistance   Contact guard  -KD    Sitting-Balance Support   Feet supported;No upper extremity supported  -KD    Sitting-Balance Activities   --   ADL  -KD    Sitting # of Minutes   45  -KD    Standing-Level of Assistance   Contact guard  -KD    Standing Balance # of Minutes   3  -KD    Recorded by   [KD] Mervat Valentine,  SEVERINO/L    Therapy Exercises    Bilateral Lower Extremities  AROM:;10 reps;ankle pumps/circles  -RW     Recorded by  [RW] Doc Davison PTA     Positioning and Restraints    Pre-Treatment Position in bed  - in bed  -RW sitting in chair/recliner  -KD    Post Treatment Position bed  - chair  -RW chair  -KD    In Bed supine;call light within reach;encouraged to call for assist  -JH      In Chair  reclined;call light within reach;legs elevated  -RW reclined;call light within reach;encouraged to call for assist;exit alarm on  -KD    Recorded by [] Montserrat Patel, SEVERINO/L [RW] Doc Davison PTA [KD] Mervat Valentine SEVERINO/L      User Key  (r) = Recorded By, (t) = Taken By, (c) = Cosigned By    Initials Name Effective Dates     Doc Davison PTA 10/17/16 -     KD Mervat Valentine SEVERINO/L 10/17/16 -      Montserrat Patel SEVERINO/L 10/17/16 -                 OT Goals       03/06/17 1110 03/04/17 1030 03/03/17 0905    Patient Education OT LTG    Patient Education OT LTG, Date Goal Reviewed (P)  03/06/17  - 03/04/17  -KD 03/03/17  -KD    Patient Education OT LTG Outcome (P)  goal ongoing  - goal not met  -KD     Patient Education OT LTG, Reason Goal Not Met (P)  unable to make needed progress  - unable to make needed progress  -KD     ADL OT LTG    ADL OT LTG, Date Goal Reviewed (P)  03/06/17  -JH 03/04/17  -KD 03/03/17  -KD    ADL OT LTG, Outcome (P)  goal ongoing  - goal not met  -KD     ADL OT LTG, Reason Goal Not Met (P)  unable to make needed progress  -JH unable to make needed progress  -KD     Activity Tolerance OT STG    Activity Tolerance Goal OT STG, Date Goal Reviewed (P)  03/06/17  -JH 03/04/17  -KD 03/03/17  -KD    Activity Tolerance Goal OT STG, Outcome (P)  goal ongoing  - goal not met  -KD     Activity Tolerance Goal OT STG, Reason Goal Not Met (P)  unable to make needed progress  - unable to make needed progress  -KD     Endurance OT LTG    Endurance Goal OT LTG, Date Goal Reviewed (P)   03/06/17  -JH 03/04/17  -KD 03/03/17  -KD    Endurance Goal OT LTG, Outcome (P)  goal ongoing  - goal not met  -KD     Endurance Goal OT LTG, Reason Goal Not Met (P)  unable to make needed progress  - unable to make needed progress  -KD       03/02/17 0913 03/01/17 1310 02/28/17 1500    Patient Education OT LTG    Patient Education OT LTG, Date Established   02/28/17  -    Patient Education OT LTG, Time to Achieve   by discharge  -    Patient Education OT LTG, Education Type   HEP;home safety  -    Patient Education OT LTG, Education Understanding   verbalizes understanding;demonstrates adequately;independent  -    Patient Education OT LTG, Date Goal Reviewed 03/02/17  - 03/01/17  -     Patient Education OT LTG Outcome  goal ongoing  [facility]     Patient Education OT LTG, Reason Goal Not Met  unable to make needed progress  -     ADL OT LTG    ADL OT LTG, Date Established   02/28/17  -    ADL OT LTG, Time to Achieve   by discharge  -    ADL OT LTG, Activity Type   ADL skills  -    ADL OT LTG, [place] Level   min verbal cues;standby assist   AE as needed  -    ADL OT LTG, Date Goal Reviewed 03/02/17  -BL 03/01/17  -     ADL OT LTG, Outcome  goal ongoing  -     ADL OT LTG, Reason Goal Not Met  unable to make needed progress  -     Activity Tolerance OT STG    Activity Tolerance Goal OT STG, Date Established   02/28/17  -    Activity Tolerance Goal OT STG, Time to Achieve   1 wk  -    Activity Tolerance Goal OT STG, Activity Level   10 min activity;O2 sat >/equal to 90%;with 2 rest breaks  -    Activity Tolerance Goal OT STG, Date Goal Reviewed 03/02/17  -BL 03/01/17  -     Activity Tolerance Goal OT STG, Outcome  goal ongoing  -     Activity Tolerance Goal OT STG, Reason Goal Not Met  unable to make needed progress  -     Endurance OT LTG    Endurance Goal OT LTG, Date Established   02/28/17  -    Endurance Goal OT LTG, Time to Achieve   by discharge  -    Endurance Goal  OT LTG, Activity Level   endurance 2 good-   15 minute act  -    Endurance Goal OT LTG, Date Goal Reviewed 03/02/17  -BL 03/01/17  -     Endurance Goal OT LTG, Outcome  goal ongoing  -     Endurance Goal OT LTG, Reason Goal Not Met  unable to make needed progress  -       User Key  (r) = Recorded By, (t) = Taken By, (c) = Cosigned By    Initials Name Provider Type     Pina Almeida, OTR/L Occupational Therapist     Mervat Valentine, SEVERINO/L Occupational Therapy Assistant     Montserrat Patel, SEVERINO/L Occupational Therapy Assistant     Sandra Davison, SEVERINO/L Occupational Therapy Assistant          Occupational Therapy Education     Title: PT OT SLP Therapies (Active)     Topic: Occupational Therapy (Active)     Point: ADL training (Active)    Description: Instruct learner(s) on proper safety adaptation and remediation techniques during self care or transfers.   Instruct in proper use of assistive devices.    Learning Progress Summary    Learner Readiness Method Response Comment Documented by Status   Patient Acceptance E NR   03/06/17 1339 Active    Acceptance TB NR   03/04/17 1144 Active    Acceptance E [facility] 03/03/17 1144 Active    Acceptance E VU   03/01/17 1429 Done               Point: Home exercise program (Active)    Description: Instruct learner(s) on appropriate technique for monitoring, assisting and/or progressing therapeutic exercises/activities.    Learning Progress Summary    Learner Readiness Method Response Comment Documented by Status   Patient Acceptance E [facility] 03/06/17 1339 Active    Acceptance E [facility] 03/03/17 1144 Active    Eager E,D VU PLB and EC/WS techniques educated on today verbalizing understanding.  03/02/17 0912 Done    Acceptance E VU   03/01/17 1429 Done               Point: Precautions (Active)    Description: Instruct learner(s) on prescribed precautions during self-care and functional transfers.    Learning Progress Summary    Learner Readiness Method Response  Comment Documented by Status   Patient Acceptance E NR   03/06/17 1339 Active    Acceptance E NR   03/03/17 1144 Active    Eager E,D VU PLB and EC/WS techniques educated on today verbalizing understanding.  03/02/17 0912 Done    Acceptance E VU   03/01/17 1429 Done    Acceptance E VU,NR Pt educated to call and not get up on his own. Pt educated on safety with t/f and during eval. Pt educated about OT and POC.  02/28/17 1547 Done               Point: Body mechanics (Active)    Description: Instruct learner(s) on proper positioning and spine alignment during self-care, functional mobility activities and/or exercises.    Learning Progress Summary    Learner Readiness Method Response Comment Documented by Status   Patient Acceptance E NR   03/06/17 1339 Active    Acceptance E NR   03/03/17 1144 Active    Eager E,D VU PLB and EC/WS techniques educated on today verbalizing understanding.  03/02/17 0912 Done    Acceptance E VU   03/01/17 1429 Done    Acceptance E VU,NR Pt educated to call and not get up on his own. Pt educated on safety with t/f and during eval. Pt educated about OT and POC.  02/28/17 1547 Done                      User Key     Initials Effective Dates Name Provider Type Discipline     10/17/16 -  OTTO Jacob/L Occupational Therapist OT     10/17/16 -  MAYCOL Macdonald/L Occupational Therapy Assistant OT     10/17/16 -  MAYCOL Banks/L Occupational Therapy Assistant OT     10/17/16 -  MAYCOL Vieira/MAYELIN Occupational Therapy Assistant OT                  OT Recommendation and Plan  Anticipated Discharge Disposition: skilled nursing facility, inpatient rehabilitation facility (TCU/LTACH/SNF)  Planned Therapy Interventions: activity intolerance, adaptive equipment training, ADL retraining, IADL retraining, balance training, bed mobility training, energy conservation, fine motor coordination training, home exercise program, motor coordination training, ROM  (Range of Motion), strengthening, transfer training  Therapy Frequency:  (3-14x a week)  Plan of Care Review  Outcome Summary/Follow up Plan: (P) Pt with increased pain and edema this date, moaned through out session        Outcome Measures       03/06/17 1110 03/05/17 0935 03/04/17 1030    How much help from another person do you currently need...    Turning from your back to your side while in flat bed without using bedrails?  4  -RW     Moving from lying on back to sitting on the side of a flat bed without bedrails?  4  -RW     Moving to and from a bed to a chair (including a wheelchair)?  3  -RW     Standing up from a chair using your arms (e.g., wheelchair, bedside chair)?  3  -RW     Climbing 3-5 steps with a railing?  2  -RW     To walk in hospital room?  3  -RW     AM-PAC 6 Clicks Score  19  -RW     How much help from another is currently needed...    Putting on and taking off regular lower body clothing? 1  -  2  -KD    Bathing (including washing, rinsing, and drying) 1  -  2  -KD    Toileting (which includes using toilet bed pan or urinal) 1  -  2  -KD    Putting on and taking off regular upper body clothing 3  -  3  -KD    Taking care of personal grooming (such as brushing teeth) 2  -  3  -KD    Eating meals 2  -  4  -KD    Score 10  -  16  -KD      User Key  (r) = Recorded By, (t) = Taken By, (c) = Cosigned By    Initials Name Provider Type    RW Doc Davison, GERI Physical Therapy Assistant     LILIAN MacdonaldA/L Occupational Therapy Assistant     LILIAN BanksA/L Occupational Therapy Assistant           Time Calculation:         Time Calculation- OT       03/06/17 1342          Time Calculation- OT    OT Start Time 1110  -      OT Stop Time 1145  -      OT Time Calculation (min) 35 min  -      Total Timed Code Minutes- OT 35 minute(s)  -      OT Received On 03/06/17  -        User Key  (r) = Recorded By, (t) = Taken By, (c) = Cosigned By    Initials Name Provider  Type     OBED Banks Occupational Therapy Assistant           Therapy Charges for Today     Code Description Service Date Service Provider Modifiers Qty    11663540580 HC OT SELF CARE/MGMT/TRAIN EA 15 MIN 3/6/2017 OBED Banks GO 1    70097652532 HC OT THERAPEUTIC ACT EA 15 MIN 3/6/2017 OBED Banks GO 1          OT G-codes  OT Professional Judgement Used?: Yes  OT Functional Scales Options: AM-PAC 6 Clicks Daily Activity (OT)  Score: 16  Functional Limitation: Self care  Self Care Current Status (): At least 40 percent but less than 60 percent impaired, limited or restricted  Self Care Goal Status (): At least 20 percent but less than 40 percent impaired, limited or restricted    OBED Banks  3/6/2017

## 2017-03-06 NOTE — SIGNIFICANT NOTE
03/06/17 1709   Rehab Treatment   Treatment Not Performed patient/family decline treatment, pt not feeling well   Recommendation   PT - Next Appointment 03/07/17

## 2017-03-06 NOTE — PLAN OF CARE
Problem: Patient Care Overview (Adult)  Goal: Plan of Care Review  Outcome: Ongoing (interventions implemented as appropriate)    03/06/17 1110   Outcome Evaluation   Outcome Summary/Follow up Plan Pt with increased pain and edema this date, moaned through out session       Goal: Discharge Needs Assessment  Outcome: Ongoing (interventions implemented as appropriate)    02/28/17 1500 03/01/17 1739 03/03/17 1330   Discharge Needs Assessment   Concerns To Be Addressed --  discharge planning concerns --    Readmission Within The Last 30 Days --  no previous admission in last 30 days --    Equipment Needed After Discharge --  oxygen --    Discharge Facility/Level Of Care Needs --  --  skilled transitional care   Discharge Disposition --  home healthcare service --    Discharge Planning Comments --  --  --    Current Health   Outpatient/Agency/Support Group Needs --  homecare agency (specify level of care) --    Anticipated Changes Related to Illness --  none --    Self-Care   Equipment Currently Used at Home cane, quad;cane, straight;grab bar --  --    Living Environment   Transportation Available --  car;family or friend will provide --      03/05/17 1020   Discharge Needs Assessment   Concerns To Be Addressed --    Readmission Within The Last 30 Days --    Equipment Needed After Discharge --    Discharge Facility/Level Of Care Needs --    Discharge Disposition --    Discharge Planning Comments currently pt needs 24/7 care at [place]   Current Health   Outpatient/Agency/Support Group Needs --    Anticipated Changes Related to Illness --    Self-Care   Equipment Currently Used at Home --    Living Environment   Transportation Available --          Problem: Inpatient Occupational Therapy  Goal: Patient Education Goal LTG- OT  Outcome: Ongoing (interventions implemented as appropriate)    02/28/17 1500 03/06/17 1110   Patient Education OT LTG   Patient Education OT LTG, Date Established 02/28/17 --    Patient Education OT LTG,  Time to Achieve by discharge --    Patient Education OT LTG, Education Type HEP;home safety --    Patient Education OT LTG, Education Understanding verbalizes understanding;demonstrates adequately;independent --    Patient Education OT LTG, Date Goal Reviewed --  03/06/17   Patient Education OT LTG Outcome --  goal ongoing   Patient Education OT LTG, Reason Goal Not Met --  unable to make needed progress       Goal: ADL Goal LTG- OT  Outcome: Ongoing (interventions implemented as appropriate)    02/28/17 1500 03/06/17 1110   ADL OT LTG   ADL OT LTG, Date Established 02/28/17 --    ADL OT LTG, Time to Achieve by discharge --    ADL OT LTG, Activity Type ADL skills --    ADL OT LTG, [place] Level min verbal cues;standby assist  (AE as needed) --    ADL OT LTG, Date Goal Reviewed --  03/06/17   ADL OT LTG, Outcome --  goal ongoing   ADL OT LTG, Reason Goal Not Met --  unable to make needed progress       Goal: Activity Tolerance Goal STG- OT  Outcome: Ongoing (interventions implemented as appropriate)    02/28/17 1500 03/06/17 1110   Activity Tolerance OT STG   Activity Tolerance Goal OT STG, Date Established 02/28/17 --    Activity Tolerance Goal OT STG, Time to Achieve 1 wk --    Activity Tolerance Goal OT STG, Activity Level 10 min activity;O2 sat >/equal to 90%;with 2 rest breaks --    Activity Tolerance Goal OT STG, Date Goal Reviewed --  03/06/17   Activity Tolerance Goal OT STG, Outcome --  goal ongoing   Activity Tolerance Goal OT STG, Reason Goal Not Met --  unable to make needed progress       Goal: Endurance Goal LTG- OT  Outcome: Ongoing (interventions implemented as appropriate)    02/28/17 1500 03/06/17 1110   Endurance OT LTG   Endurance Goal OT LTG, Date Established 02/28/17 --    Endurance Goal OT LTG, Time to Achieve by discharge --    Endurance Goal OT LTG, Activity Level endurance 2 good-  (15 minute act) --    Endurance Goal OT LTG, Date Goal Reviewed --  03/06/17   Endurance Goal OT LTG,  Outcome --  goal ongoing   Endurance Goal OT LTG, Reason Goal Not Met --  unable to make needed progress

## 2017-03-06 NOTE — PROGRESS NOTES
Cardiology Progress Note:     LOS: 12 days   Patient Care Team:  Klaus Faria MD as PCP - General      Subjective:   Requested by Dr. Wells to evaluate the patient for symptoms of shortness of breath which has been progressively worsen.  Chart reviewed , patient seen and examined.  Patient subjectively is sitting in the bed and does complain of having some symptoms of shortness of breath with bilateral lower extremity edema is increasing.  Patient denies any symptoms of palpitation.  Review of the record indicated patient has preserved left ventricular systolic function and is status post aortic valve replacement with severe right atrial enlargement with evidence of pulmonary hypertension.  Patient is currently on diltiazem.  Patient does complain of having bilateral leg heaviness.          Objective:     Objective:  Vitals:    03/05/17 1922   BP:    Pulse:    Resp:    Temp:    SpO2: 97%     No intake or output data in the 24 hours ending 03/05/17 1959          Physical Exam:   General Appearance:    Alert, oriented, cooperative, in no acute distress   Head:    Normocephalic, atraumatic, without obvious abnormality   Eyes:           HECTOR  Lids and lashes normal, conjunctivae and sclerae normal, no icterus, no pallor   Ears:    Ears appear intact with no abnormalities noted   Throat:   Mucous membranes pink and moist   Neck:   Supple, trachea midline, no carotid bruit, no organomegaly or JVD   Lungs:     Clear to auscultation and percussion, respirations regular, even and Unlabored. No wheezes, rales, rhonchi    Heart:    irregularly irregular S1 and S2 with a soft systolic murmur best heard at the base.     Abdomen:     Soft, non-tender, non-distended, no guarding, no rebound tenderness, Normal bowel sounds in all four quadrant, no masses, liver and spleen nonpalpable,    Genitalia:    Deferred   Extremities:   Moves all extremities well, , no cyanosis, no              Redness, no clubbing.  +2 to +3 pitting  edema noted.     Pulses:   Pulses palpable and equal bilaterally   Skin:   Moist and warm. No bleeding, bruising or rash   Neurologic/Psychiatric:   Alert and oriented to person, place, and time.  Motor, power and tone in upper and lower extremity is grossly intact.  No focal neurological deficits. Normal cognitive function. No psychomotor reaction or tangential thought. No depression, homicidal ideations and suicidal ideations            Results Review:    Lab Results (last 24 hours)     Procedure Component Value Units Date/Time    Blood Gas, Arterial [[id number]]  (Abnormal) Collected:  03/05/17 0500    Specimen:  Arterial Blood Updated:  03/05/17 0528     Site --       Not performed at this site.        Harrison's Test --       Not performed at this site.        pH, Arterial 7.429 pH units      pCO2, Arterial 37.4 mm Hg      pO2, Arterial 60.3 (L) mm Hg      HCO3, Arterial 24.2 mmol/L      Base Excess, Arterial 0.1 mmol/L      O2 Saturation, Arterial 91.1 (L) %      Hemoglobin, Blood Gas 12.9 (L) g/dL      Hematocrit, Blood Gas 38.0 %      CO2 Content 25.4      Sodium, Arterial 135.4 (L) mmol/L      Potassium, Arterial 3.75 mmol/L      Glucose, Arterial 119 mmol/L      Barometric Pressure for Blood Gas -- mmHg       Not performed at this site.        Modality --       Not performed at this site.        Ionized Calcium 4.7 mg/dL     CBC & Differential [[id number]] Collected:  03/05/17 0534    Specimen:  Blood Updated:  03/05/17 0631    Narrative:       The following orders were created for panel order CBC & Differential.  Procedure                               Abnormality         Status                     ---------                               -----------         ------                     CBC Auto Differential[[id number]]         Abnormal            Final result                 Please view results for these tests on the individual orders.    CBC Auto Differential [[id number]]  (Abnormal) Collected:  03/05/17 0534     Specimen:  Blood Updated:  03/05/17 0631     WBC 7.82 10*3/mm3      RBC 4.23 (L) 10*6/mm3      Hemoglobin 11.7 (L) g/dL      Hematocrit 34.3 (L) %      MCV 81.1 fL      MCH 27.7 pg      MCHC 34.1 g/dL      RDW 17.8 (H) %      RDW-SD 53.2 (H) fl      MPV 10.9 fL      Platelets 171 10*3/mm3      Neutrophil % 92.8 (H) %      Lymphocyte % 4.3 (L) %      Monocyte % 2.8 %      Eosinophil % 0.0 %      Basophil % 0.0 %      Immature Grans % 0.1 %      Neutrophils, Absolute 7.25 10*3/mm3      Lymphocytes, Absolute 0.34 (L) 10*3/mm3      Monocytes, Absolute 0.22 10*3/mm3      Eosinophils, Absolute 0.00 10*3/mm3      Basophils, Absolute 0.00 10*3/mm3      Immature Grans, Absolute 0.01 10*3/mm3     Magnesium [[id number]]  (Abnormal) Collected:  03/05/17 0534    Specimen:  Blood Updated:  03/05/17 0642     Magnesium 2.6 (H) mg/dL     Basic Metabolic Panel [[id number]]  (Abnormal) Collected:  03/05/17 0534    Specimen:  Blood Updated:  03/05/17 0645     Glucose 115 (H) mg/dL       (H) mg/dL      Creatinine 2.15 (H) mg/dL      Sodium 138 mmol/L      Potassium 3.7 mmol/L      Chloride 101 mmol/L      CO2 26.0 mmol/L      Calcium 8.6 mg/dL      eGFR Non African Amer 30 (L) mL/min/1.73      BUN/Creatinine Ratio 47.0 (H)      Anion Gap 11.0 mmol/L     Narrative:       The MDRD GFR formula is only valid for adults with stable renal function between ages 18 and 70.    Protime-INR [[id number]]  (Abnormal) Collected:  03/05/17 0534    Specimen:  Blood Updated:  03/05/17 0650     Protime 27.1 (H) Seconds      INR 2.60 (H)     Narrative:       Therapeutic range for most indications is 2.0-3.0 INR,  or 2.5-3.5 for mechanical heart valves.    POC Glucose Fingerstick [[id number]]  (Abnormal) Collected:  03/05/17 1023    Specimen:  Blood Updated:  03/05/17 1047     Glucose 175 (H) mg/dL       RN NotifiedMeter: [id number]: [id number] DONTRELL ALEXANDER       POC Glucose Fingerstick [[id number]]  (Abnormal) Collected:  03/05/17 1715    Specimen:   Blood Updated:  03/05/17 1729     Glucose 305 (H) mg/dL       RN NotifiedMeter: [id number]: [id number] JAYLAN KITCHEN              Medication Review:   Current Facility-Administered Medications   Medication Dose Route Frequency Provider Last Rate Last Dose   • acetaminophen (TYLENOL) tablet 650 mg  650 mg Oral Q6H PRN Adriana Wells MD   650 mg at 03/05/17 1136   • albuterol (PROVENTIL HFA;VENTOLIN HFA) inhaler 2 puff  2 puff Inhalation Q4H PRN Adriana Wells MD       • albuterol (PROVENTIL) nebulizer solution 0.083% 2.5 mg/3mL  2.5 mg Nebulization Q4H PRN LONNIE Leon       • atorvastatin (LIPITOR) tablet 20 mg  20 mg Oral Daily Adriana Wells MD   20 mg at 03/05/17 0846   • budesonide (PULMICORT) nebulizer solution 0.5 mg  0.5 mg Nebulization BID - RT Zackary Anand MD   0.5 mg at 03/05/17 1907   • cholecalciferol (VITAMIN D3) tablet 1,000 Units  1,000 Units Oral Daily Adriana Wells MD   1,000 Units at 03/05/17 0846   • clopidogrel (PLAVIX) tablet 75 mg  75 mg Oral Daily Adriana Wells MD   75 mg at 03/05/17 0846   • dextrose (D50W) solution 25 g  25 g Intravenous Q15 Min PRN Adriana Wells MD       • dextrose (D50W) solution 50 mL  50 mL Intravenous Q1H PRN Stephani Bingham MD   50 mL at 02/22/17 0645   • dextrose (GLUTOSE) oral gel 15 g  15 g Oral Q15 Min PRN Adriana Wells MD       • diltiaZEM CD (CARDIZEM CD) 24 hr capsule 240 mg  240 mg Oral Daily Adriana Welsl MD   240 mg at 03/05/17 0846   • escitalopram (LEXAPRO) tablet 10 mg  10 mg Oral Daily Adriana Wells MD   10 mg at 03/05/17 0845   • furosemide (LASIX) 1 mg/mL in sodium chloride 0.9 % 100 mL infusion  10 mg/hr Intravenous Continuous Harshul Az Wells MD 10 mL/hr at 03/05/17 1621 10 mg/hr at 03/05/17 1621   • glipiZIDE (GLUCOTROL) 24 hr tablet 2.5 mg  2.5 mg Oral Nightly Adriana Wells MD   2.5 mg at 03/04/17 2059   • glucagon (human recombinant)  (GLUCAGEN DIAGNOSTIC) injection 1 mg  1 mg Subcutaneous Q15 Min PRN Adriana Wells MD       • hydrALAZINE (APRESOLINE) tablet 100 mg  100 mg Oral Q8H Richi Hayward MD   100 mg at 03/05/17 1733   • insulin aspart (novoLOG) injection 0-9 Units  0-9 Units Subcutaneous 4x Daily AC & at Bedtime Adriana Wells MD   7 Units at 03/05/17 1734   • insulin detemir (LEVEMIR) injection 15 Units  15 Units Subcutaneous Nightly Zackary Anand MD   15 Units at 03/04/17 2053   • ipratropium (ATROVENT) nebulizer solution 0.5 mg  0.5 mg Nebulization 4x Daily - RT Zackary Anand MD   0.5 mg at 03/05/17 1907   • isosorbide mononitrate (IMDUR) 24 hr tablet 60 mg  60 mg Oral Q24H Zackary Anand MD   60 mg at 03/05/17 0846   • LORazepam (ATIVAN) injection 0.5 mg  0.5 mg Intravenous Q4H PRN Jimbo Shelley MD   0.5 mg at 03/05/17 1444   • methylPREDNISolone sodium succinate (SOLU-Medrol) injection 20 mg  20 mg Intravenous Q12H Richi Hayward MD   20 mg at 03/05/17 1733   • pantoprazole (PROTONIX) EC tablet 40 mg  40 mg Oral Q AM Fabiana Wells MD   40 mg at 03/05/17 0847   • Pharmacy to dose warfarin   Does not apply Continuous PRN Zackary Anand MD       • phenol (CHLORASEPTIC) 1.4 % liquid 2 spray  2 spray Mouth/Throat Q2H PRN Fabiana Wells MD       • sodium chloride 0.9 % flush 10 mL  10 mL Intravenous PRN David Henderson MD   10 mL at 03/03/17 2125   • warfarin (COUMADIN) tablet 4 mg  4 mg Oral Daily Fabiana Wells MD   4 mg at 03/05/17 1733       Assessment and Plan:    Principal Problem:    CHF (congestive heart failure)  Active Problems:    Nonrheumatic aortic valve stenosis    Persistent atrial fibrillation    Congestive heart failure    Aortic valve stenosis    Dyslipidemia    Essential hypertension    Kidney insufficiency    History of cerebrovascular accident    Tobacco dependence syndrome   1.  Atrial fibrillation.  Patient rate is controlled currently on Cardizem and is on  anticoagulation with Coumadin.  Patient has not complained of having any symptoms of palpitation.  2.  Shortness of breath with congestive heart failure with pulmonary hypertension with right atrial and right ventricular enlargement.  Patient is currently on isosorbide 30 mg.  Patient had not been responding to diuretics and has been started on IV Lasix drip.  At the present time would be concerned about the low albumin and will check  chemistry panel to evaluate the albumin level.  If the patient has low albumin patient would need 25% albumin followed by Lasix to decrease the lower extremity edema and to improve the patient's symptoms of shortness of breath.  3.  Status post aortic valve replacement.  Is noted.  4.  Arterial hypertension.  Patient blood pressure is labile and elevated.  At the present time would consider increasing the dose of the hydralazine to 4 times a day for better blood pressure control and to continue with the other antihypertensive medication including the diltiazem and isosorbide.  5.  Chronic kidney disease.  Patient has an elevated BUN and creatinine and has been followed by nephrology.  6.  Chronic anticoagulation with Coumadin.  Patient had not had any bleeding diastasis.  Will follow the PT/INR.    Dr. Caro,: To resume the cardiac care in the morning          Sd Taylor MD  03/05/17  7:59 PM      Time: Time spent on face-to-face interaction 25 minutes    EMR Dragon/Transcription disclaimer:   Some of this note may be an electronic transcription/translation of spoken language to printed text. The electronic translation of spoken language may permit erroneous, or at times, nonsensical words or phrases to be inadvertently transcribed; Although I have reviewed the note for such errors, some may still exist.

## 2017-03-06 NOTE — PROGRESS NOTES
[facility] Medicine Services  INPATIENT PROGRESS NOTE    Length of Stay: 13  Date of Admission: 2/21/2017  Primary Care Physician: Klaus Faria MD    Subjective   Chief Complaint:   Chief Complaint   Patient presents with   • Shortness of Breath       HPI      Review of Systems   Respiratory: Positive for shortness of breath.    Neurological: Positive for weakness.      All pertinent negatives and positives are as above. All other systems have been reviewed and are negative unless otherwise stated.     Objective    Temp:  [96.3 °F (35.7 °C)-97.8 °F (36.6 °C)] 97.8 °F (36.6 °C)  Heart Rate:  [65-93] 93  Resp:  [18-22] 20  BP: (135-185)/(61-81) 135/61  Physical Exam   Constitutional: He is oriented to person, place, and time. He appears well-developed and well-nourished.  Non-toxic appearance. He does not have a sickly appearance. He does not appear ill. No distress. He is not intubated.   HENT:   Head: Normocephalic and atraumatic.   Right Ear: External ear normal.   Left Ear: External ear normal.   Nose: Nose normal.   Mouth/Throat: Oropharynx is clear and moist. No oropharyngeal exudate.   Eyes: Conjunctivae and EOM are normal. Pupils are equal, round, and reactive to light. Right eye exhibits no discharge and no exudate. Left eye exhibits no discharge and no exudate. Right conjunctiva is not injected. Right conjunctiva has no hemorrhage. Left conjunctiva is not injected. Left conjunctiva has no hemorrhage. No scleral icterus.   Neck: Normal range of motion. Neck supple. No hepatojugular reflux and no JVD present. No tracheal tenderness, no spinous process tenderness and no muscular tenderness present. Carotid bruit is not present. No rigidity. No tracheal deviation, no edema, no erythema and normal range of motion present. No thyroid mass and no thyromegaly present.   Cardiovascular: Normal rate, normal heart sounds and intact distal pulses.  An irregular rhythm  present.  No extrasystoles are present. Exam reveals no gallop and no friction rub.    No murmur heard.  Pulmonary/Chest: Effort normal. No stridor. He is not intubated. No respiratory distress. He has no decreased breath sounds. He has no wheezes. He has no rhonchi. He has rales. He exhibits no tenderness.   Abdominal: Soft. Normal appearance and bowel sounds are normal. He exhibits no shifting dullness, no distension, no pulsatile liver, no fluid wave, no abdominal bruit, no ascites, no pulsatile midline mass and no mass. There is no hepatosplenomegaly, splenomegaly or hepatomegaly. There is no tenderness. There is no rigidity, no rebound, no guarding, no CVA tenderness, no tenderness at McBurney's point and negative Munoz's sign. No hernia.   Genitourinary: Rectal exam shows guaiac negative stool.   Musculoskeletal: Normal range of motion. He exhibits no edema, tenderness or deformity.        Right shoulder: He exhibits no swelling.       Vascular Status -  His exam exhibits right foot edema. His exam exhibits left foot edema.   Skin Integrity  -   He hasno right foot ulcer, non-callous right foot, no right foot warmth and no right foot blister. He has no left foot ulcer, non-callous left foot, no left foot warmth and no left foot blister..  Lymphadenopathy:     He has no cervical adenopathy.     He has no axillary adenopathy.   Neurological: He is alert and oriented to person, place, and time. He has normal strength and normal reflexes. He is not disoriented. He displays no atrophy, no tremor and normal reflexes. No cranial nerve deficit or sensory deficit. He exhibits normal muscle tone. He displays no seizure activity. Coordination and gait normal. He displays no Babinski's sign on the right side. He displays no Babinski's sign on the left side.   Skin: Skin is warm and dry. No abrasion, no bruising, no burn, no ecchymosis, no laceration, no lesion, no purpura and no rash noted. Rash is not macular, not  papular, not maculopapular, not nodular, not pustular, not vesicular and not urticarial. He is not diaphoretic. No cyanosis or erythema. No pallor. Nails show no clubbing.   Psychiatric: Judgment and thought content normal. His mood appears not anxious. His affect is not angry, not blunt, not labile and not inappropriate. His speech is not slurred. He is not agitated, not aggressive, not hyperactive, not slowed, not withdrawn and not combative. Thought content is not paranoid and not delusional. Cognition and memory are not impaired. He does not express impulsivity or inappropriate judgment. He does not exhibit a depressed mood. He expresses no homicidal and no suicidal ideation. He expresses no suicidal plans and no homicidal plans. He is communicative. He exhibits normal recent memory and normal remote memory.           Results Review:  I have reviewed the labs, radiology results, and diagnostic studies.    Laboratory Data:   Lab Results (last 24 hours)     Procedure Component Value Units Date/Time    POC Glucose Fingerstick [[id number]]  (Abnormal) Collected:  03/05/17 1715    Specimen:  Blood Updated:  03/05/17 1729     Glucose 305 (H) mg/dL       RN NotifiedMeter: [id number]: [id number] JAYLAN KITCHEN       POC Glucose Fingerstick [[id number]]  (Abnormal) Collected:  03/05/17 2031    Specimen:  Blood Updated:  03/05/17 2054     Glucose 176 (H) mg/dL       RN NotifiedMeter: [id number]: [id number] DELON JOYCE       POC Glucose Fingerstick [[id number]]  (Normal) Collected:  03/06/17 0606    Specimen:  Blood Updated:  03/06/17 0625     Glucose 94 mg/dL       Meter: [id number]: [id number] DELON JOYCE       CBC & Differential [[id number]] Collected:  03/06/17 0636    Specimen:  Blood Updated:  03/06/17 0652    Narrative:       The following orders were created for panel order CBC & Differential.  Procedure                               Abnormality         Status                      ---------                               -----------         ------                     CBC Auto Differential[[id number]]         Abnormal            Final result                 Please view results for these tests on the individual orders.    CBC Auto Differential [[id number]]  (Abnormal) Collected:  03/06/17 0636    Specimen:  Blood Updated:  03/06/17 0652     WBC 9.83 (H) 10*3/mm3      RBC 4.07 (L) 10*6/mm3      Hemoglobin 11.3 (L) g/dL      Hematocrit 33.3 (L) %      MCV 81.8 fL      MCH 27.8 pg      MCHC 33.9 g/dL      RDW 18.2 (H) %      RDW-SD 55.1 (H) fl      MPV 11.0 fL      Platelets 159 10*3/mm3      Neutrophil % 95.3 (H) %      Lymphocyte % 3.3 (L) %      Monocyte % 1.2 %      Eosinophil % 0.0 %      Basophil % 0.0 %      Immature Grans % 0.2 %      Neutrophils, Absolute 9.37 (H) 10*3/mm3      Lymphocytes, Absolute 0.32 (L) 10*3/mm3      Monocytes, Absolute 0.12 10*3/mm3      Eosinophils, Absolute 0.00 10*3/mm3      Basophils, Absolute 0.00 10*3/mm3      Immature Grans, Absolute 0.02 10*3/mm3     Magnesium [[id number]]  (Abnormal) Collected:  03/06/17 0636    Specimen:  Blood Updated:  03/06/17 0704     Magnesium 2.5 (H) mg/dL     Comprehensive Metabolic Panel [[id number]]  (Abnormal) Collected:  03/06/17 0636    Specimen:  Blood Updated:  03/06/17 0710     Glucose 95 mg/dL       (H) mg/dL      Creatinine 2.21 (H) mg/dL      Sodium 137 mmol/L      Potassium 3.7 mmol/L      Chloride 103 mmol/L      CO2 28.0 mmol/L      Calcium 8.6 mg/dL      Total Protein 5.8 (L) g/dL      Albumin 3.00 (L) g/dL      ALT (SGPT) 97 (H) U/L      AST (SGOT) 37 U/L      Alkaline Phosphatase 95 U/L      Total Bilirubin 0.6 mg/dL      eGFR Non African Amer 29 (L) mL/min/1.73      Globulin 2.8 gm/dL      A/G Ratio 1.1 g/dL      BUN/Creatinine Ratio 46.6 (H)      Anion Gap 6.0 mmol/L     Narrative:       The MDRD GFR formula is only valid for adults with stable renal function between ages 18 and 70.    Protime-INR [[id number]]   (Abnormal) Collected:  03/06/17 0636    Specimen:  Blood Updated:  03/06/17 0724     Protime 35.7 (H) Seconds      INR 3.76 (H)     Narrative:       Therapeutic range for most indications is 2.0-3.0 INR,  or 2.5-3.5 for mechanical heart valves.    POC Glucose Fingerstick [[id number]]  (Abnormal) Collected:  03/06/17 1017    Specimen:  Blood Updated:  03/06/17 1033     Glucose 152 (H) mg/dL       RN NotifiedMeter: [id number]: [id number] EARL QUEZADA             Culture Data:   No results found for: BLOODCX  No results found for: URINECX  No results found for: RESPCX  No results found for: WOUNDCX  No results found for: STOOLCX  No components found for: BODYFLD    Radiology Data:   Imaging Results (last 24 hours)     Procedure Component Value Units Date/Time    XR Chest Bilateral Decubitus [[id number]] Collected:  03/05/17 1717     Updated:  03/05/17 1721    Narrative:       Patient Name:  MR. ELLEN MEDINA  Patient ID:  [id number]   Ordering:  LAURA NAVAS  Attending:  LAURA NAVAS  Referring:  LAURA NAVAS  ------------------------------------------------  PROCEDURE: XR CHEST LATERAL, DECUBITUS, OR KAREN    INDICATION FOR PROCEDURE:  76 years -old patient presents for  evaluation of pleural effusion.    COMPARISON:  Chest radiograph dated the fifth 2017.    FINDINGS: Bilateral lateral decubitus radiographs  reveal  bilateral pleural effusions.     The lungs are expanded. Patient is poststernotomy. Cardiac  monitoring leads are present.    The patient has left-sided intracardiac pacemaker. There is a  right-sided central line.  There is no radiographic evidence for  pneumothorax. Mediastinal and cardiac silhouettes are within  normal limits. The bones are osteopenic.      Impression:       Bilateral pleural effusions.    Electronically signed by:  Rosa Holm MD  3/5/2017 5:20 PM CST  Workstation: [facility] [facility]    XR Chest PA & Lateral [[id number]] Collected:  03/05/17 1728     Updated:  03/05/17 1733     Narrative:       Patient Name:  MR. ELLEN MEDINA  Patient ID:  [id number]   Ordering:  LAURA NAVAS  Attending:  LAURA NAVAS  Referring:  LAURA NAVAS  ------------------------------------------------    PROCEDURE: XR CHEST 2 VIEWS    INDICATION FOR PROCEDURE:  76 years -old patient presents for  evaluation of shortness of breath.    COMPARISON:  Chest radiograph dated March 3, 2017    FINDINGS: XR CHEST 2 views  reveal the lungs are underexpanded.  Patient has left-sided intracardiac pacemaker. Right-sided PICC  line is present with the tip of the catheter is in the superior  vena cava. Patient has had a sternotomy. Cardiac monitor leads  are present.    There are moderately large pleural effusions. Bronchovascular  markings are prominent in both lungs. There are vascular  calcifications of the thoracic aorta.    There is no radiographic evidence for pneumothorax. Cardiac  silhouette is mildly enlarged. Mediastinal silhouette is within  normal limits.     There is increased thoracic kyphosis. Patient has had a  sternotomy.       Impression:       Probable congestive heart failure with moderately  large bilateral pleural effusions.    Electronically signed by:  Rosa Holm MD  3/5/2017 5:32 PM CST  Workstation: [id number]          I have reviewed the patient current medications.     Assessment/Plan   Principal Problem:  CHF (congestive heart failure)  Active Problems:  Nonrheumatic aortic valve stenosis  Persistent atrial fibrillation  Congestive heart failure  Aortic valve stenosis  Dyslipidemia  Essential hypertension  Kidney insufficiency  History of cerebrovascular accident  Tobacco dependence syndrome              -Patient's condition improved overnight.  -We'll continue with PT OT treatment.  -We'll continue IV diuretics.  -Nephrology and cardiology follow-up appreciated.  - long discussion with the son to see how aggressive  They want t o be ,if he wants him to have dialysis . Not sure the  patient will tolerate it and also not sure of the long term benefit from this type of treatment . Son will discuss it with the   Rest of the family and let us know . Patient has less than 6 months to live   -DVT and GI prophylaxis in place.     Deny Araujo MD   03/06/17   12:57 PM

## 2017-03-07 PROBLEM — N19 RENAL FAILURE: Status: ACTIVE | Noted: 2017-01-01

## 2017-03-07 NOTE — PROGRESS NOTES
"Anticoagulation by Pharmacy - Warfarin    Kevin Henning is a 76 y.o.male  [Ht: 65.98\" (167.6 cm); Wt: 168 lb (76.2 kg)] on Warfarin  for indication of a fib. Current dose on HOLD.    Goal INR: 2-3  Today's INR:   Lab Results   Component Value Date    INR 3.36 (H) 03/07/2017         Lab Results   Component Value Date    INR 3.36 (H) 03/07/2017    INR 3.76 (H) 03/06/2017    INR 2.60 (H) 03/05/2017    PROTIME 32.8 (H) 03/07/2017    PROTIME 35.7 (H) 03/06/2017    PROTIME 27.1 (H) 03/05/2017     Lab Results   Component Value Date    HGB 11.6 (L) 03/07/2017    HGB 11.3 (L) 03/06/2017    HGB 11.7 (L) 03/05/2017     Lab Results   Component Value Date    HCT 34.7 (L) 03/07/2017    HCT 33.3 (L) 03/06/2017    HCT 34.3 (L) 03/05/2017     Assessment/Plan:  Reviewed above labs. INR is 3.36.  INR is above goal. No changes in medication list. Pt did not receive dose of warfarin last night.  Will continue to hold current dose.Rx will continue to follow and adjust dose accordingly.        Marychuy Horne [facility]  03/07/17 10:28 AM     "

## 2017-03-07 NOTE — THERAPY DISCHARGE NOTE
Acute Care - Occupational Therapy Discharge Summary  [facility]     Patient Name: Kevin Henning  : 1940  MRN: [id number]    Today's Date: 3/7/2017  Onset of Illness/Injury or Date of Surgery Date: 17    Date of Referral to OT: 17  Referring Physician: Dr. STEW Wells      Admit Date: 2017        OT Recommendation and Plan    Visit Dx:    ICD-10-CM ICD-9-CM   1. Congestive heart failure, unspecified congestive heart failure chronicity, unspecified congestive heart failure type I50.9 428.0   2. Impaired physical mobility Z74.09 781.99   3. Impaired mobility and ADLs Z74.09 799.89   4. Impaired gait and mobility R26.89 781.2                     OT Goals       17 1613 17 1110 17 1030    Patient Education OT LTG    Patient Education OT LTG, Date Goal Reviewed 17  - 17  - 17  -KD    Patient Education OT LTG Outcome goal not met  - goal ongoing  - goal not met  -KD    Patient Education OT LTG, Reason Goal Not Met discharged from [facility] unable to make needed progress  - unable to make needed progress  -KD    ADL OT LTG    ADL OT LTG, Date Goal Reviewed 17  - 17  - 17  -KD    ADL OT LTG, Outcome goal not met  - goal [facility] goal not met  -KD    ADL OT LTG, Reason Goal Not Met discharged from [facility] unable to make needed progress  - unable to make needed progress  -KD    Activity Tolerance OT STG    Activity Tolerance Goal OT STG, Date Goal Reviewed 17  - 17  - 17  -KD    Activity Tolerance Goal OT STG, Outcome goal not met  - goal ongoing  - goal not met  -KD    Activity Tolerance Goal OT STG, Reason Goal Not Met discharged from [facility] unable to make needed progress  - unable to make needed progress  -KD    Endurance OT LTG    Endurance Goal OT LTG, Date Goal Reviewed 17  - 17  - 17  -KD    Endurance Goal OT LTG, Outcome goal not met  -[facility] ongoing   - goal not met  -    Endurance Goal OT LTG, Reason Goal Not Met discharged from facility  - unable to make needed progress  - unable to make needed progress  -      03/03/17 0905 03/02/17 0913 03/01/17 1310    Patient Education OT LTG    Patient Education OT LTG, Date Goal Reviewed 03/03/17  -KD 03/02/17  -BL 03/01/17  -    Patient Education OT LTG Outcome   goal ongoing  -    Patient Education OT LTG, Reason Goal Not Met   unable to make needed progress  -    ADL OT LTG    ADL OT LTG, Date Goal Reviewed 03/03/17  -KD 03/02/17  -BL 03/01/17  -    ADL OT LTG, Outcome   goal ongoing  -    ADL OT LTG, Reason Goal Not Met   unable to make needed progress  -    Activity Tolerance OT STG    Activity Tolerance Goal OT STG, Date Goal Reviewed 03/03/17  -KD 03/02/17  -BL 03/01/17  -    Activity Tolerance Goal OT STG, Outcome   goal ongoing  -    Activity Tolerance Goal OT STG, Reason Goal Not Met   unable to make needed progress  -    Endurance OT LTG    Endurance Goal OT LTG, Date Goal Reviewed 03/03/17  -KD 03/02/17  -BL 03/01/17  -    Endurance Goal OT LTG, Outcome   goal ongoing  -    Endurance Goal OT LTG, Reason Goal Not Met   unable to make needed progress  -      02/28/17 1500          Patient Education OT LTG    Patient Education OT LTG, Date Established 02/28/17  -      Patient Education OT LTG, Time to Achieve by discharge  -      Patient Education OT LTG, Education Type HEP;home safety  -      Patient Education OT LTG, Education Understanding verbalizes understanding;demonstrates adequately;independent  -      ADL OT LTG    ADL OT LTG, Date Established 02/28/17  -      ADL OT LTG, Time to Achieve by discharge  -      ADL OT LTG, Activity Type ADL skills  -      ADL OT LTG, [place] Level min verbal cues;standby assist   AE as needed  -      Activity Tolerance OT STG    Activity Tolerance Goal OT STG, Date Established 02/28/17  -      Activity Tolerance Goal  OT STG, Time to Achieve 1 wk  -      Activity Tolerance Goal OT STG, Activity Level 10 min activity;O2 sat >/equal to 90%;with 2 rest breaks  -      Endurance OT LTG    Endurance Goal OT LTG, Date Established 02/28/17  -      Endurance Goal OT LTG, Time to Achieve by discharge  -      Endurance Goal OT LTG, Activity Level endurance 2 good-   15 minute act  -        User Key  (r) = Recorded By, (t) = Taken By, (c) = Cosigned By    Initials Name Provider Type     Pina Almeida, OTR/L Occupational Therapist    DWAIN Valentine, SEVERINO/L Occupational Therapy Assistant     Montserrat Patel, SEVERINO/L Occupational Therapy Assistant     Sandra Davison, SEVERINO/L Occupational Therapy Assistant                Outcome Measures       03/06/17 1110 03/05/17 0935       How much help from another person do you currently need...    Turning from your back to your side while in flat bed without using bedrails?  4  -RW     Moving from lying on back to sitting on the side of a flat bed without bedrails?  4  -RW     Moving to and from a bed to a chair (including a wheelchair)?  3  -RW     Standing up from a chair using your arms (e.g., wheelchair, bedside chair)?  3  -RW     Climbing 3-5 steps with a railing?  2  -RW     To walk in hospital room?  3  -RW     AM-PAC 6 Clicks Score  19  -RW     How much help from another is currently needed...    Putting on and taking off regular lower body clothing? 1  -JH      Bathing (including washing, rinsing, and drying) 1  -JH      Toileting (which includes using toilet bed pan or urinal) 1  -JH      Putting on and taking off regular upper body clothing 3  -JH      Taking care of personal grooming (such as brushing teeth) 2  -JH      Eating meals 2  -JH      Score 10  -        User Key  (r) = Recorded By, (t) = Taken By, (c) = Cosigned By    Initials Name Provider Type     Doc Davison, PTA Physical Therapy Assistant     Motnserrat Patel, SEVERINO/L Occupational Therapy Assistant               OT Discharge Summary  Anticipated Discharge Disposition: inpatient rehabilitation facility, skilled nursing facility  Reason for Discharge: Discharge from facility, Per MD order  Outcomes Achieved: Refer to plan of care for updates on goals achieved  Discharge Destination: other (comment) (d/c to a different facility)      Pina Almeida OTR/L  3/7/2017

## 2017-03-07 NOTE — PLAN OF CARE
Problem: Inpatient Occupational Therapy  Goal: Patient Education Goal LTG- OT  Outcome: Unable to achieve outcome(s) by discharge Date Met:  03/07/17 02/28/17 1500 03/07/17 1613   Patient Education OT LTG   Patient Education OT LTG, Date Established 02/28/17 --    Patient Education OT LTG, Time to Achieve by discharge --    Patient Education OT LTG, Education Type HEP;home safety --    Patient Education OT LTG, Education Understanding verbalizes understanding;demonstrates adequately;independent --    Patient Education OT LTG, Date Goal Reviewed --  03/07/17   Patient Education OT LTG Outcome --  goal not met   Patient Education OT LTG, Reason Goal Not Met --  discharged from facility       Goal: ADL Goal LTG- OT  Outcome: Unable to achieve outcome(s) by discharge Date Met:  03/07/17 02/28/17 1500 03/07/17 1613   ADL OT LTG   ADL OT LTG, Date Established 02/28/17 --    ADL OT LTG, Time to Achieve by discharge --    ADL OT LTG, Activity Type ADL skills --    ADL OT LTG, [place] Level min verbal cues;standby assist  (AE as needed) --    ADL OT LTG, Date Goal Reviewed --  03/07/17   ADL OT LTG, Outcome --  goal not met   ADL OT LTG, Reason Goal Not Met --  discharged from facility       Goal: Activity Tolerance Goal STG- OT  Outcome: Unable to achieve outcome(s) by discharge Date Met:  03/07/17 02/28/17 1500 03/07/17 1613   Activity Tolerance OT STG   Activity Tolerance Goal OT STG, Date Established 02/28/17 --    Activity Tolerance Goal OT STG, Time to Achieve 1 wk --    Activity Tolerance Goal OT STG, Activity Level 10 min activity;O2 sat >/equal to 90%;with 2 rest breaks --    Activity Tolerance Goal OT STG, Date Goal Reviewed --  03/07/17   Activity Tolerance Goal OT STG, Outcome --  goal not met   Activity Tolerance Goal OT STG, Reason Goal Not Met --  discharged from facility       Goal: Endurance Goal LTG- OT  Outcome: Unable to achieve outcome(s) by discharge Date Met:  03/07/17 02/28/17 1500  03/07/17 1613   Endurance OT LTG   Endurance Goal OT LTG, Date Established 02/28/17 --    Endurance Goal OT LTG, Time to Achieve by discharge --    Endurance Goal OT LTG, Activity Level endurance 2 good-  (15 minute act) --    Endurance Goal OT LTG, Date Goal Reviewed --  03/07/17   Endurance Goal OT LTG, Outcome --  goal not met   Endurance Goal OT LTG, Reason Goal Not Met --  discharged from facility

## 2017-03-07 NOTE — PLAN OF CARE
Problem: Inpatient Physical Therapy  Goal: Transfer Training Goal 1 LTG- PT  Outcome: Unable to achieve outcome(s) by discharge Date Met:  03/07/17 03/07/17 1624   Transfer Training PT LTG   Transfer Training PT LTG, Date Goal Reviewed 03/07/17   Transfer Training PT LTG, Outcome goal not met   Transfer Training PT LTG, Reason Goal Not Met discharged from facility;medical status inhibits participation       Goal: Gait Training Goal STG- PT  Outcome: Unable to achieve outcome(s) by discharge Date Met:  03/07/17 03/07/17 1624   Gait Training PT STG   Gait Training Goal PT STG, Date Goal Reviewed 03/07/17   Gait Training Goal PT STG, Outcome goal not met   Gait Training Goal PT STG, Reason Goal Not Met discharged from facility;medical status inhibits participation       Goal: Gait Training Goal LTG- PT  Outcome: Unable to achieve outcome(s) by discharge Date Met:  03/07/17 03/07/17 1624   Gait Training PT LTG   Gait Training Goal PT LTG, Date Goal Reviewed 03/07/17   Gait Training Goal PT LTG, Outcome goal not met   Gait Training Goal PT LTG, Reason Goal Not Met discharged from facility;medical status inhibits participation       Goal: Stair Training Goal LTG- PT  Outcome: Unable to achieve outcome(s) by discharge Date Met:  03/07/17 03/07/17 1624   Stair Training PT LTG   Stair Training Goal PT LTG, Date Goal Reviewed 03/07/17   Stair Training Goal PT LTG, Outcome goal not met   Stair Training Goal PT LTG, Reason Goal Not Met discharged from facility;medical status inhibits participation       Goal: Strength Goal LTG- PT  Outcome: Unable to achieve outcome(s) by discharge Date Met:  03/07/17 03/07/17 1624   Strength Goal PT LTG   Strength Goal PT LTG, Date Goal Reviewed 03/07/17   Strength Goal PT LTG, Outcome goal not met   Strength Goal PT LTG, Reason Goal Not Met discharged from facility;medical status inhibits participation       Goal: Patient Education Goal LTG- PT  Outcome: Unable to achieve  outcome(s) by discharge Date Met:  03/07/17 03/07/17 1624   Patient Education PT LTG   Patient Education PT LTG, Date Goal Reviewed 03/07/17   Patient Education PT LTG Outcome goal not met   Patient Education PT LTG, Reason Goal Not Met discharged from facility;medical status inhibits participation

## 2017-03-07 NOTE — PROGRESS NOTES
"   LOS: 14 days    Patient Care Team:  Klaus Faria MD as PCP - General    Subjective:  He is more confused lethargic family at bedside    Review of Systems:   Review of Systems   Respiratory: Positive for shortness of breath.    Psychiatric/Behavioral: Positive for altered mental status.   All other systems reviewed and are negative.      Objective    Lungs few crackles are heard at the bases.  Heart regular rate and rhythm with systolic murmur at the left sternal border.  Abdomen soft and tender distended no hepatojugular reflux.   Extremities 2+ pitting edema bilaterally    Vital Signs  Temp:  [96.8 °F (36 °C)-97.8 °F (36.6 °C)] 97.2 °F (36.2 °C)  Heart Rate:  [60-93] 67  Resp:  [20-25] 24  BP: (135-194)/(61-78) 194/76    Flowsheet Rows         First Filed Value    Admission Height  66\" (167.6 cm) Documented at 02/21/2017 1119    Admission Weight  160 lb (72.6 kg) Documented at 02/21/2017 1119        I/O last 3 completed shifts:  In: 311.7 [P.O.:120; I.V.:191.7]  Out: -     Intake/Output Summary (Last 24 hours) at 03/07/17 1029  Last data filed at 03/07/17 0740   Gross per 24 hour   Intake 421.67 ml   Output      0 ml   Net 421.67 ml       Physical Exam:  Physical Exam   Constitutional: He appears well-developed.   Eyes: Pupils are equal, round, and reactive to light.   Cardiovascular: Normal rate and regular rhythm.    Pulmonary/Chest: Effort normal. He has rales.   Abdominal: Soft. Bowel sounds are normal.        Results Review:      Results from last 7 days  Lab Units 03/07/17  0555 03/06/17  0636 03/05/17  0534   SODIUM mmol/L 136* 137 138   POTASSIUM mmol/L 3.7 3.7 3.7   CHLORIDE mmol/L 100 103 101   TOTAL CO2 mmol/L 27.0 28.0 26.0   BUN mg/dL 100* 103* 101*   CREATININE mg/dL 2.16* 2.21* 2.15*   CALCIUM mg/dL 8.9 8.6 8.6   BILIRUBIN mg/dL 0.8 0.6  --    ALK PHOS U/L 94 95  --    ALT (SGPT) U/L 79* 97*  --    AST (SGOT) U/L 37 37  --    GLUCOSE mg/dL 277* 95 115*       Estimated Creatinine Clearance: " 25.8 mL/min (by C-G formula based on Cr of 2.16).      Results from last 7 days  Lab Units 03/07/17  0555 03/06/17  0636 03/05/17  0534   MAGNESIUM mg/dL 2.5* 2.5* 2.6*               Results from last 7 days  Lab Units 03/07/17  0554 03/06/17  0636 03/05/17  0534 03/04/17  0611 03/03/17  0652   WBC 10*3/mm3 9.33 9.83* 7.82 8.79 9.73   HEMOGLOBIN g/dL 11.6* 11.3* 11.7* 11.5* 12.0*   PLATELETS 10*3/mm3 152 159 171 171 193         Results from last 7 days  Lab Units 03/07/17  0555 03/06/17  0636 03/05/17  0534 03/04/17  0611 03/03/17  0652   INR  3.36* 3.76* 2.60* 2.80* 3.37*         Imaging Results (last 24 hours)     ** No results found for the last 24 hours. **            Medication Review:   Current Facility-Administered Medications   Medication Dose Route Frequency Provider Last Rate Last Dose   • acetaminophen (TYLENOL) tablet 650 mg  650 mg Oral Q6H PRN Adriana Wells MD   650 mg at 03/05/17 1136   • albuterol (PROVENTIL HFA;VENTOLIN HFA) inhaler 2 puff  2 puff Inhalation Q4H PRN Adriana Wells MD       • albuterol (PROVENTIL) nebulizer solution 0.083% 2.5 mg/3mL  2.5 mg Nebulization Q4H PRN LONNIE Leon       • atorvastatin (LIPITOR) tablet 20 mg  20 mg Oral Daily Adriana Wells MD   20 mg at 03/07/17 0835   • budesonide (PULMICORT) nebulizer solution 0.5 mg  0.5 mg Nebulization BID - RT Zackary Anand MD   0.5 mg at 03/07/17 0745   • cholecalciferol (VITAMIN D3) tablet 1,000 Units  1,000 Units Oral Daily Adriana Wells MD   1,000 Units at 03/07/17 0840   • clopidogrel (PLAVIX) tablet 75 mg  75 mg Oral Daily Adriana Wells MD   75 mg at 03/07/17 0836   • dextrose (D50W) solution 25 g  25 g Intravenous Q15 Min PRN Adriana Wells MD       • dextrose (D50W) solution 50 mL  50 mL Intravenous Q1H PRN Stephani Bingham MD   50 mL at 02/22/17 0645   • dextrose (GLUTOSE) oral gel 15 g  15 g Oral Q15 Min PRN Adriana Wells MD       • diltiaZEM CD (CARDIZEM CD) 24  hr capsule 240 mg  240 mg Oral Daily Adriana Wells MD   240 mg at 03/07/17 0836   • escitalopram (LEXAPRO) tablet 10 mg  10 mg Oral Daily Adriana Wells MD   10 mg at 03/07/17 0836   • furosemide (LASIX) 100 mg, albumin human 25 % 100 mL 110 mL infusion  5 mL/hr Intravenous Continuous Richi Hayward MD 5 mL/hr at 03/07/17 0740 5 mL/hr at 03/07/17 0740   • glipiZIDE (GLUCOTROL) 24 hr tablet 2.5 mg  2.5 mg Oral Nightly Adriana Wells MD   2.5 mg at 03/06/17 2128   • glucagon (human recombinant) (GLUCAGEN DIAGNOSTIC) injection 1 mg  1 mg Subcutaneous Q15 Min PRN Adriana Wells MD       • hydrALAZINE (APRESOLINE) tablet 100 mg  100 mg Oral Q6H Sd Taylor MD   100 mg at 03/07/17 0835   • insulin aspart (novoLOG) injection 0-9 Units  0-9 Units Subcutaneous 4x Daily AC & at Bedtime Adriana Wells MD   6 Units at 03/07/17 0836   • insulin detemir (LEVEMIR) injection 15 Units  15 Units Subcutaneous Nightly Zackarybabs Anand MD   15 Units at 03/05/17 2100   • ipratropium (ATROVENT) nebulizer solution 0.5 mg  0.5 mg Nebulization 4x Daily - RT Zackarybabs Anand MD   0.5 mg at 03/07/17 0738   • isosorbide mononitrate (IMDUR) 24 hr tablet 60 mg  60 mg Oral Q24H Zackary Girish Anand MD   60 mg at 03/07/17 0836   • LORazepam (ATIVAN) injection 0.5 mg  0.5 mg Intravenous Q4H PRN Jimbo Shelley MD   0.5 mg at 03/06/17 2128   • methylPREDNISolone sodium succinate (SOLU-Medrol) injection 20 mg  20 mg Intravenous Q12H Richi Hayward MD   20 mg at 03/07/17 0511   • Morphine sulfate (PF) injection 1 mg  1 mg Intravenous Q2H PRN Deny Araujo MD   1 mg at 03/07/17 0902   • pantoprazole (PROTONIX) EC tablet 40 mg  40 mg Oral Q AM Fabiana Wells MD   40 mg at 03/05/17 0847   • Pharmacy to dose warfarin   Does not apply Continuous PRN Zackary Anand MD       • phenol (CHLORASEPTIC) 1.4 % liquid 2 spray  2 spray Mouth/Throat Q2H PRN Fabiana Wells MD       • sodium chloride 0.9 %  flush 10 mL  10 mL Intravenous PRN David Henderson MD   10 mL at 03/03/17 9856       Assessment/Plan    1 chronic kidney disease stage III now worsening uremia and cr stable. Now has mental status change due to med vs uremia.   2 congestive heart failure with significant volume overload. 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 dw daughter and they like to keep him comfortable. They have met hospice yesterday and plan to stop lasix drip and further lab draws. They all agreed and dw Primary team. Will sign off and call for any questions    Principal Problem:    CHF (congestive heart failure)  Active Problems:    Nonrheumatic aortic valve stenosis    Persistent atrial fibrillation    Congestive heart failure    Aortic valve stenosis    Dyslipidemia    Essential hypertension    Kidney insufficiency    History of cerebrovascular accident    Tobacco dependence syndrome              Richi Hayward MD  03/07/17  10:29 AM

## 2017-03-07 NOTE — PROGRESS NOTES
"Anticoagulation by Pharmacy - Warfarin     Kevin Henning is a 76 y.o.male [Ht: 65.98\" (167.6 cm); Wt: 168 lb (76.2 kg)] on Warfarin for indication of a fib.     Goal INR: 2-3  Today's INR:         Lab Results   Component Value Date     INR 3.36 (H) 03/07/2017                  Lab Results   Component Value Date     INR 3.36 (H) 03/07/2017     INR 3.76 (H) 03/06/2017     INR 2.60 (H) 03/05/2017     PROTIME 32.8 (H) 03/07/2017     PROTIME 35.7 (H) 03/06/2017     PROTIME 27.1 (H) 03/05/2017            Lab Results   Component Value Date     HGB 11.6 (L) 03/07/2017     HGB 11.3 (L) 03/06/2017     HGB 11.7 (L) 03/05/2017            Lab Results   Component Value Date     HCT 34.7 (L) 03/07/2017     HCT 33.3 (L) 03/06/2017     HCT 34.3 (L) 03/05/2017      Assessment/Plan:  INR supratherapeutic. Will continue to hold for another day and readdress tomorrow.     Norbert Medina, [facility]  03/07/17 8:15 AM     "

## 2017-03-07 NOTE — SIGNIFICANT NOTE
03/07/17 0955   Rehab Treatment   Discipline occupational therapy assistant   Treatment Not Performed (Nsg deferred tx  2' Hospice consult this date.)

## 2017-03-07 NOTE — SIGNIFICANT NOTE
03/07/17 0954   Rehab Treatment   Discipline physical therapy assistant   Treatment Not Performed other (see comments)  (Nursing deferred tx this AM, nursing stated that pt has hospice consult this AM)   Recommendation   PT - Next Appointment 03/07/17

## 2017-03-07 NOTE — THERAPY DISCHARGE NOTE
Acute Care - Physical Therapy Discharge Summary  [facility]       Patient Name: Kevin Henning  : 1940  MRN: [id number]    Today's Date: 3/7/2017  Onset of Illness/Injury or Date of Surgery Date: 17       Referring Physician: Dr. STEW Wells      Admit Date: 2017      PT Recommendation and Plan    Visit Dx:    ICD-10-CM ICD-9-CM   1. Congestive heart failure, unspecified congestive heart failure chronicity, unspecified congestive heart failure type I50.9 428.0   2. Impaired physical mobility Z74.09 781.99   3. Impaired mobility and ADLs Z74.09 799.89   4. Impaired gait and mobility R26.89 781.2             Outcome Measures       17 1110 17 0935       How much help from another person do you currently need...    Turning from your back to your side while in flat bed without using bedrails?  4  -RW     Moving from lying on back to sitting on the side of a flat bed without bedrails?  4  -RW     Moving to and from a bed to a chair (including a wheelchair)?  3  -RW     Standing up from a chair using your arms (e.g., wheelchair, bedside chair)?  3  -RW     Climbing 3-5 steps with a railing?  2  -RW     To walk in hospital room?  3  -RW     AM-PAC 6 Clicks Score  19  -RW     How much help from another is currently needed...    Putting on and taking off regular lower body clothing? 1  -JH      Bathing (including washing, rinsing, and drying) 1  -JH      Toileting (which includes using toilet bed pan or urinal) 1  -JH      Putting on and taking off regular upper body clothing 3  -JH      Taking care of personal grooming (such as brushing teeth) 2  -JH      Eating meals 2  -JH      Score 10  -JH        User Key  (r) = Recorded By, (t) = Taken By, (c) = Cosigned By    Initials Name Provider Type    RW Doc Davison PTA Physical Therapy Assistant     MAYCOL Banks/L Occupational Therapy Assistant                PT Charges       17 0971          Time Calculation    PT - Next  Appointment 03/07/17  -        User Key  (r) = Recorded By, (t) = Taken By, (c) = Cosigned By    Initials Name Provider Type    TRENT Mora, PTA Physical Therapy Assistant                  IP PT Goals       03/07/17 1624 03/05/17 1020 03/03/17 1330    Transfer Training PT LTG    Transfer Training PT  LTG, Date Goal Reviewed 03/07/17  -MN 03/05/17  -RW 03/03/17  -LN    Transfer Training PT LTG, Outcome goal not met  -MN goal not met  -RW goal not met  -LN    Transfer Training PT LTG, Reason Goal Not Met discharged from facility;medical status inhibits participation  -MN progress slower than expected  -RW progress slower than expected  -LN    Gait Training PT STG    Gait Training Goal PT STG, Date Goal Reviewed 03/07/17  -MN 03/05/17  -RW 03/03/17  -LN    Gait Training Goal PT STG, Outcome goal not met  -MN goal not met  -RW goal not met  -LN    Gait Training Goal PT STG, Reason Goal Not Met discharged from facility;medical status inhibits participation  -MN  progress slower than expected  -LN    Gait Training PT LTG    Gait Training Goal PT LTG, Date Goal Reviewed 03/07/17  -MN 03/05/17  -RW 03/03/17  -LN    Gait Training Goal PT LTG, Outcome goal not met  -MN goal not met  -RW goal not met  -LN    Gait Training Goal PT LTG, Reason Goal Not Met discharged from facility;medical status inhibits participation  -MN progress slower than expected  -RW progress slower than expected  -LN    Stair Training PT LTG    Stair Training Goal PT LTG, Date Goal Reviewed 03/07/17  -MN 03/05/17  -RW 03/03/17  -LN    Stair Training Goal PT LTG, Outcome goal not met  -MN goal not met  -RW goal not met  -LN    Stair Training Goal PT LTG, Reason Goal Not Met discharged from facility;medical status inhibits participation  -MN progress slower than expected  -RW progress slower than expected  -LN    Strength Goal PT LTG    Strength Goal PT LTG, Date Goal Reviewed 03/07/17  -MN 03/05/17  -RW 03/03/17  -LN    Strength Goal PT LTG,  Outcome goal not met  -MN goal not met  -RW goal not met  -LN    Strength Goal PT LTG, Reason Goal Not Met discharged from facility;medical status inhibits participation  -MN progress slower than expected  -RW progress slower than expected  -LN    Patient Education PT LTG    Patient Education PT LTG, Date Goal Reviewed 03/07/17  -MN 03/05/17  -RW 03/03/17  -LN    Patient Education PT LTG Outcome goal not met  -MN goal not met  -RW goal not met  -LN    Patient Education PT LTG, Reason Goal Not Met discharged from facility;medical status inhibits participation  -MN progress slower than expected  -RW progress slower than expected  -LN      03/02/17 1123 03/01/17 0927 02/28/17 1439    Transfer Training PT LTG    Transfer Training PT LTG, Date Established   02/28/17  -EZ    Transfer Training PT LTG, Time to Achieve   by discharge  -EZ    Transfer Training PT LTG, [place] Level   conditional independence  -EZ    Transfer Training PT LTG, Assist Device   other (see comments)   AAD  -EZ    Transfer Training PT  LTG, Date Goal Reviewed 03/02/17  -MP 03/01/17  -TW     Transfer Training PT LTG, Outcome  goal ongoing  -TW goal ongoing  -EZ    Gait Training PT STG    Gait Training Goal PT STG, Date Established   02/28/17  -EZ    Gait Training Goal PT STG, Time to Achieve   3 days  -EZ    Gait Training Goal PT STG, [place] Level   supervision required  -EZ    Gait Training Goal PT STG, Assist Device   other (see comments)   AAD  -EZ    Gait Training Goal PT STG, Distance to Achieve   50  -EZ    Gait Training Goal PT STG, Additional Goal   O2 sats will not drop below 92%  -EZ    Gait Training Goal PT STG, Date Goal Reviewed 03/02/17  -MP 03/01/17  -TW     Gait Training Goal PT STG, Outcome  goal ongoing  -TW goal ongoing  -EZ    Gait Training PT LTG    Gait Training Goal PT LTG, Date Established   02/28/17  -EZ    Gait Training Goal PT LTG, Time to Achieve   by discharge  -EZ    Gait Training Goal PT LTG, [place]  Level   conditional independence  -    Gait Training Goal PT LTG, Assist Device   other (see comments)   AAD  -    Gait Training Goal PT LTG, Distance to Achieve   150  -    Gait Training Goal PT LTG, Date Goal Reviewed 03/02/17  -MP 03/01/17  -TW     Gait Training Goal PT LTG, Outcome  goal ongoing  - goal ongoing  -    Stair Training PT LTG    Stair Training Goal PT LTG, Date Established   02/28/17  -    Stair Training Goal PT LTG, Time to Achieve   by discharge  -    Stair Training Goal PT LTG, Number of Steps   3  -    Stair Training Goal PT LTG, [place] Level   conditional independence  -    Stair Training Goal PT LTG, Assist Device   2 handrails;cane, quad  -    Stair Training Goal PT LTG, Date Goal Reviewed 03/02/17  -MP 03/01/17  -TW     Stair Training Goal PT LTG, Outcome  goal ongoing - goal ongoing  -    Strength Goal PT LTG    Strength Goal PT LTG, Date Established   02/28/17  -    Strength Goal PT LTG, Time to Achieve   by discharge  -    Strength Goal PT LTG, Measure to Achieve   Pt will perform 2 sets of 15 reps or AROM exercises  -    Strength Goal PT LTG, Functional Goal   Pt will progress to standing exercises  -    Strength Goal PT LTG, Date Goal Reviewed 03/02/17  -MP 03/01/17  -TW     Strength Goal PT LTG, Outcome  goal ongoing  - goal ongoing  -    Patient Education PT LTG    Patient Education PT LTG, Date Established   02/28/17  -    Patient Education PT LTG, Time to Achieve   by discharge  -    Patient Education PT LTG, Education Type   written program;HEP;posture/body mechanics;benefits of activity;home safety  -    Patient Education PT LTG, Date Goal Reviewed 03/02/17  -MP 03/01/17  -TW     Patient Education PT LTG Outcome  goal ongoing  - goal ongoing  [facility]      User Key  (r) = Recorded By, (t) = Taken By, (c) = Cosigned By    Initials Name Provider Type    CECE Esparza, PT Physical Therapist    EZ Stone, PT Physical Therapist     LN Harriet River, PTA Physical Therapy Assistant    TW Raoul Devine, PTA Physical Therapy Assistant    RW Doc Davison, PTA Physical Therapy Assistant    MP Greg Robison, PT Physical Therapist              PT Discharge Summary  Anticipated Discharge Disposition: placement for care  Reason for Discharge: Discharge from facility, Per MD order, Change in medical status (pt placed on hospice care)  Outcomes Achieved: Unable to make functional progress toward goals at this time  Discharge Destination: other (comment)      Rosalie Esparza, PT   3/7/2017

## 2017-03-07 NOTE — PROGRESS NOTES
Continued Stay Note  [facility]     Patient Name: Kevin Henning  MRN: [id number]  Today's Date: 3/7/2017    Admit Date: 2/21/2017          Discharge Plan       03/07/17 1304    Case Management/Social Work Plan    Plan Hospice    Patient/Family In Agreement With Plan yes    Additional Comments JESSICA spoke with Sherita CARUSO [facility] this date. Pt will be admitted under hospice this date. Sherita to have orders signed by MD. Once orders are obtained a new account will be set up with hospice orders initiated by MD.       03/07/17 1028    Case Management/Social Work Plan    Additional Comments Rounding complete - To be admitted to [facility] this date.               Discharge Codes     None        Expected Discharge Date and Time     Expected Discharge Date Expected Discharge Time    Mar 6, 2017             LILIANE Menard

## 2017-03-07 NOTE — PROGRESS NOTES
FAMILY MEDICINE DAILY PROGRESS NOTE  NAME: Kevin Henning  : 1940  MRN: [id number]     LOS: 14 days     PROVIDER OF SERVICE: Virginia Buck MD    Chief Complaint: CHF (congestive heart failure)    Subjective:     Interval History:  History taken from: chart family RN  Patient Complaints: shortness of breath    Review of Systems:   Review of Systems  Review of systems     Constitutional: no weight loss, fever, chills, weakness  HEENT: no visual loss, blurred vision, double vision, yellow scalarea  Skin: no rash, no discoloration   CVS: no chest pain, palpitations  Chest: positive for shortness of breath   GI: no abdominal pain, blood in stool, no nausea, vomiting, diarrhea  : no burning in urination, change in odor, no change in frequency  Neuro: weakness  Musculoskeletal: generalized weakness  Lymphatics: no enlarged nodes  Endo: no reports of sweating, cold or heat intolerance, no polyuria      Objective:     Vital Signs  Temp:  [96.8 °F (36 °C)-97.8 °F (36.6 °C)] 97.2 °F (36.2 °C)  Heart Rate:  [60-93] 67  Resp:  [20-25] 24  BP: (135-194)/(61-78) 194/76    Physical Exam  Physical Exam  Constitutional: He is oriented to person, place, and time. He appears well-developed and well-nourished. Non-toxic appearance. He does not have a sickly appearance. He does not appear ill. No distress. He is not intubated.   HENT:   Head: Normocephalic and atraumatic.   Right Ear: External ear normal.   Left Ear: External ear normal.   Nose: Nose normal.   Mouth/Throat: Oropharynx is clear and moist. No oropharyngeal exudate.   Eyes: Conjunctivae and EOM are normal. Pupils are equal, round, and reactive to light. Right eye exhibits no discharge and no exudate. Left eye exhibits no discharge and no exudate. Right conjunctiva is not injected. Right conjunctiva has no hemorrhage. Left conjunctiva is not injected. Left conjunctiva has no hemorrhage. No scleral icterus.   Neck: Normal range of motion. Neck supple. No  hepatojugular reflux and no JVD present. No tracheal tenderness, no spinous process tenderness and no muscular tenderness present. Carotid bruit is not present. No rigidity. No tracheal deviation, no edema, no erythema and normal range of motion present. No thyroid mass and no thyromegaly present.   Cardiovascular: Normal rate, normal heart sounds and intact distal pulses. An irregular rhythm present. No extrasystoles are present. Exam reveals no gallop and no friction rub.   No murmur heard.  Pulmonary/Chest: Effort normal. No stridor. He is not intubated. No respiratory distress. He has no decreased breath sounds. He has no wheezes. He has no rhonchi. He has rales. He exhibits no tenderness.   Abdominal: Soft. Normal appearance and bowel sounds are normal. He exhibits no shifting dullness, no distension, no pulsatile liver, no fluid wave, no abdominal bruit, no ascites, no pulsatile midline mass and no mass. There is no hepatosplenomegaly, splenomegaly or hepatomegaly. There is no tenderness. There is no rigidity, no rebound, no guarding, no CVA tenderness, no tenderness at McBurney's point and negative Munoz's sign. No hernia.   Genitourinary: Rectal exam shows guaiac negative stool.   Musculoskeletal: Normal range of motion. He exhibits no edema, tenderness or deformity.   Right shoulder: He exhibits no swelling.     Vascular Status - His exam exhibits right foot edema. His exam exhibits left foot edema.  Skin Integrity - He hasno right foot ulcer, non-callous right foot, no right foot warmth and no right foot blister. He has no left foot ulcer, non-callous left foot, no left foot warmth and no left foot blister..  Lymphadenopathy:   He has no cervical adenopathy.   He has no axillary adenopathy.   Neurological: He is alert and oriented to person, place, and time. He has normal strength and normal reflexes. He is not disoriented. He displays no atrophy, no tremor and normal reflexes. No cranial nerve deficit or  sensory deficit. He exhibits normal muscle tone. He displays no seizure activity. Coordination and gait normal. He displays no Babinski's sign on the right side. He displays no Babinski's sign on the left side.   Skin: Skin is warm and dry. No abrasion, no bruising, no burn, no ecchymosis, no laceration, no lesion, no purpura and no rash noted. Rash is not macular, not papular, not maculopapular, not nodular, not pustular, not vesicular and not urticarial. He is not diaphoretic. No cyanosis or erythema. No pallor. Nails show no clubbing.   Psychiatric: Judgment and thought content normal. His mood appears not anxious. His affect is not angry, not blunt, not labile and not inappropriate. His speech is not slurred. He is not agitated, not aggressive, not hyperactive, not slowed, not withdrawn and not combative. Thought content is not paranoid and not delusional. Cognition and memory are not impaired. He does not express impulsivity or inappropriate judgment. He does not exhibit a depressed mood. He expresses no homicidal and no suicidal ideation. He expresses no suicidal plans and no homicidal plans. He is communicative. He exhibits normal recent memory and normal remote memory.   Medication Review    Current Facility-Administered Medications:   •  albuterol (PROVENTIL HFA;VENTOLIN HFA) inhaler 2 puff, 2 puff, Inhalation, Q4H PRN, Adriana Wells MD  •  albuterol (PROVENTIL) nebulizer solution 0.083% 2.5 mg/3mL, 2.5 mg, Nebulization, Q4H PRN, LONNIE Leon  •  atropine 1 % ophthalmic solution 2 drop, 2 drop, Sublingual, BID, Deny Araujo MD  •  budesonide (PULMICORT) nebulizer solution 0.5 mg, 0.5 mg, Nebulization, BID - RT, Zackary Anand MD, 0.5 mg at 03/07/17 0745  •  dextrose (D50W) solution 25 g, 25 g, Intravenous, Q15 Min PRN, Adriana Wells MD  •  dextrose (D50W) solution 50 mL, 50 mL, Intravenous, Q1H PRN, Stephani Bingham MD, 50 mL at 02/22/17 0645  •  dextrose (GLUTOSE) oral gel 15  g, 15 g, Oral, Q15 Min PRN, Adriana Wells MD  •  glucagon (human recombinant) (GLUCAGEN DIAGNOSTIC) injection 1 mg, 1 mg, Subcutaneous, Q15 Min PRN, Adriana Wells MD  •  ipratropium (ATROVENT) nebulizer solution 0.5 mg, 0.5 mg, Nebulization, 4x Daily - RT, Zackary Anand MD, 0.5 mg at 03/07/17 0738  •  LORazepam (ATIVAN) 2 MG/ML concentrated solution 0.5 mg, 0.5 mg, Sublingual, Q8H, Deny Araujo MD  •  Morphine sulfate (PF) injection 2 mg, 2 mg, Intravenous, Q1H PRN, Deny Araujo MD  •  phenol (CHLORASEPTIC) 1.4 % liquid 2 spray, 2 spray, Mouth/Throat, Q2H PRN, Fabiana Wells MD  •  Scopolamine (TRANSDERM-SCOP) 1.5 MG/3DAYS patch 1 patch, 1 patch, Transdermal, Q72H, Deny Araujo MD  •  sodium chloride 0.9 % flush 10 mL, 10 mL, Intravenous, PRN, David Henderson MD, 10 mL at 03/03/17 2125     Diagnostic Data    Lab Results (last 24 hours)     Procedure Component Value Units Date/Time    POC Glucose Fingerstick [[id number]]  (Abnormal) Collected:  03/06/17 2040    Specimen:  Blood Updated:  03/06/17 2107     Glucose 237 (H) mg/dL       RN NotifiedMeter: [id number]: [id number] DELON JOYCE       CBC & Differential [[id number]] Collected:  03/07/17 0554    Specimen:  Blood Updated:  03/07/17 0611    Narrative:       The following orders were created for panel order CBC & Differential.  Procedure                               Abnormality         Status                     ---------                               -----------         ------                     CBC Auto Differential[[id number]]         Abnormal            Final result                 Please view results for these tests on the individual orders.    CBC Auto Differential [[id number]]  (Abnormal) Collected:  03/07/17 0554    Specimen:  Blood Updated:  03/07/17 0611     WBC 9.33 10*3/mm3      RBC 4.26 (L) 10*6/mm3      Hemoglobin 11.6 (L) g/dL      Hematocrit 34.7 (L) %      MCV 81.5 fL      MCH 27.2 pg      MCHC 33.4  g/dL      RDW 18.1 (H) %      RDW-SD 54.3 (H) fl      MPV 11.3 fL      Platelets 152 10*3/mm3      Neutrophil % 91.0 (H) %      Lymphocyte % 5.4 (L) %      Monocyte % 3.2 %      Eosinophil % 0.1 %      Basophil % 0.0 %      Immature Grans % 0.3 %      Neutrophils, Absolute 8.49 10*3/mm3      Lymphocytes, Absolute 0.50 (L) 10*3/mm3      Monocytes, Absolute 0.30 10*3/mm3      Eosinophils, Absolute 0.01 10*3/mm3      Basophils, Absolute 0.00 10*3/mm3      Immature Grans, Absolute 0.03 (H) 10*3/mm3     Magnesium [[id number]]  (Abnormal) Collected:  03/07/17 0555    Specimen:  Blood Updated:  03/07/17 0627     Magnesium 2.5 (H) mg/dL     Comprehensive Metabolic Panel [[id number]]  (Abnormal) Collected:  03/07/17 0555    Specimen:  Blood Updated:  03/07/17 0629     Glucose 277 (H) mg/dL       (H) mg/dL      Creatinine 2.16 (H) mg/dL      Sodium 136 (L) mmol/L      Potassium 3.7 mmol/L      Chloride 100 mmol/L      CO2 27.0 mmol/L      Calcium 8.9 mg/dL      Total Protein 6.1 (L) g/dL      Albumin 3.40 g/dL      ALT (SGPT) 79 (H) U/L      AST (SGOT) 37 U/L      Alkaline Phosphatase 94 U/L      Total Bilirubin 0.8 mg/dL      eGFR Non African Amer 30 (L) mL/min/1.73      Globulin 2.7 gm/dL      A/G Ratio 1.3 g/dL      BUN/Creatinine Ratio 46.3 (H)      Anion Gap 9.0 mmol/L     Narrative:       The MDRD GFR formula is only valid for adults with stable renal function between ages 18 and 70.    Protime-INR [[id number]]  (Abnormal) Collected:  03/07/17 0555    Specimen:  Blood Updated:  03/07/17 0640     Protime 32.8 (H) Seconds      INR 3.36 (H)     Narrative:       Therapeutic range for most indications is 2.0-3.0 INR,  or 2.5-3.5 for mechanical heart valves.    POC Glucose Fingerstick [[id number]]  (Abnormal) Collected:  03/07/17 0636    Specimen:  Blood Updated:  03/07/17 0650     Glucose 279 (H) mg/dL       RN NotifiedMeter: [id number]: [id number] DELON JOYCE       POC Glucose Fingerstick [[id number]]   (Abnormal) Collected:  03/07/17 1013    Specimen:  Blood Updated:  03/07/17 1030     Glucose 327 (H) mg/dL       RN NotifiedMeter: [id number]: [id number] ARMIDA REYES I reviewed the patient's new clinical results.    Assessment/Plan:     Active Hospital Problems (** Indicates Principal Problem)    Diagnosis Date Noted   • **CHF (congestive heart failure) [I50.9]    • Aortic valve stenosis [I35.0]    • Dyslipidemia [E78.5]    • Essential hypertension [I10]    • Kidney insufficiency [N28.9]    • History of cerebrovascular accident [Z86.73]      multiple     • Tobacco dependence syndrome [F17.200]    • Congestive heart failure [I50.9] 02/21/2017   • Persistent atrial fibrillation [I48.1] 02/01/2017   • Nonrheumatic aortic valve stenosis [I35.0] 02/01/2017     S/p AVR          Resolved Hospital Problems    Diagnosis Date Noted Date Resolved   No resolved problems to display.     -discussed with the family and they would like the patient to be comfortable at this time, hospice is meeting with the patient later today to discuss comfort measures and to initiate this process with them  -patient has less than 6 months to live    Code status is Conditional Code

## 2017-03-07 NOTE — PROGRESS NOTES
CARDIOLOGY PROGRESS NOTE      LOS: 13 days   Patient Care Team:  Klaus Faria MD as PCP - General    Problems/Diagnoses:     Respiratory failure, COPD, bilateral pleural effusions, status post aortic valve replacement, sick sinus syndrome status post pacemaker implantation, chronic atrial fibrillation, CK D, failure to thrive    Subjective     Interval History:     Mr. Juvencio ventura has become progressively weak, dyspneic with drowsiness and mental status changes.  His BUN and creatinine have increased.  Underlying rhythm is atrial fibrillation with a controlled ventricular response.  INR is 3.76 (pharmacy dosing)    Review of Systems:     Constitutional:  Drowsy but arousable.  No fevers or chills     HENT:  Denies any hearing loss, epistaxis, hoarseness, or difficulty speaking.     Eyes: Wears eyeglasses or contact lenses     Respiratory:  Dyspneic at rest, no cough,     Cardiovascular: Negative for palpations, chest pain,      Gastrointestinal:  Denies change in bowel habits, dyspepsia, ulcer disease, hematochezia, or melena.     Endocrine: Negative for cold intolerance, heat intolerance, polydipsia, polyphagia and polyuria.    Genitourinary: Negative.      Musculoskeletal: Denies any history of arthritic symptoms or back problems     Skin:  Denies any change in hair or nails, rashes, or skin lesions.     Objective     Vital Signs  Temp:  [96.3 °F (35.7 °C)-97.8 °F (36.6 °C)] 97.6 °F (36.4 °C)  Heart Rate:  [65-93] 77  Resp:  [18-22] 22  BP: (135-180)/(61-81) 139/64    Physical Exam:    Constitutional: Drowsy, arousable,    HENT:   Head: Normocephalic, normal hair patterns, no masses or tenderness.  Ears, Nose, and Throat: No gross abnormalities. No pallor or cyanosis. Dentition good.   Eyes: EOMS intact, PERRL, conjunctivae and lids unremarkable. Fundoscopic exam and visual fields not performed.   Neck: No palpable masses or adenopathy, no thyromegaly, no JVD, carotid pulses are full and equal  bilaterally and without  Bruits.     Cardiovascular: Irregular rhythm, S1 variable and S2 normal, no S3 or S4. 2/6 systolic murmurs     Pulmonary/Chest: Chest: Increased AP diameter of the chest, breath sounds diminished bilaterally over the lung field bases.  There are bilateral rales    Abdominal: Abdomen soft, bowel sounds normoactive, no masses, no hepatosplenomegaly, non-tender, no bruits.         Results Review:    Lab Results (last 24 hours)     Procedure Component Value Units Date/Time    POC Glucose Fingerstick [[id number]]  (Abnormal) Collected:  03/05/17 2031    Specimen:  Blood Updated:  03/05/17 2054     Glucose 176 (H) mg/dL       RN NotifiedMeter: [id number]: [id number] DELON JOYCE       POC Glucose Fingerstick [[id number]]  (Normal) Collected:  03/06/17 0606    Specimen:  Blood Updated:  03/06/17 0625     Glucose 94 mg/dL       Meter: [id number]: [id number] DELON JOYCE       CBC & Differential [[id number]] Collected:  03/06/17 0636    Specimen:  Blood Updated:  03/06/17 0652    Narrative:       The following orders were created for panel order CBC & Differential.  Procedure                               Abnormality         Status                     ---------                               -----------         ------                     CBC Auto Differential[[id number]]         Abnormal            Final result                 Please view results for these tests on the individual orders.    CBC Auto Differential [[id number]]  (Abnormal) Collected:  03/06/17 0636    Specimen:  Blood Updated:  03/06/17 0652     WBC 9.83 (H) 10*3/mm3      RBC 4.07 (L) 10*6/mm3      Hemoglobin 11.3 (L) g/dL      Hematocrit 33.3 (L) %      MCV 81.8 fL      MCH 27.8 pg      MCHC 33.9 g/dL      RDW 18.2 (H) %      RDW-SD 55.1 (H) fl      MPV 11.0 fL      Platelets 159 10*3/mm3      Neutrophil % 95.3 (H) %      Lymphocyte % 3.3 (L) %      Monocyte % 1.2 %      Eosinophil % 0.0 %      Basophil % 0.0 %       Immature Grans % 0.2 %      Neutrophils, Absolute 9.37 (H) 10*3/mm3      Lymphocytes, Absolute 0.32 (L) 10*3/mm3      Monocytes, Absolute 0.12 10*3/mm3      Eosinophils, Absolute 0.00 10*3/mm3      Basophils, Absolute 0.00 10*3/mm3      Immature Grans, Absolute 0.02 10*3/mm3     Magnesium [[id number]]  (Abnormal) Collected:  03/06/17 0636    Specimen:  Blood Updated:  03/06/17 0704     Magnesium 2.5 (H) mg/dL     Comprehensive Metabolic Panel [[id number]]  (Abnormal) Collected:  03/06/17 0636    Specimen:  Blood Updated:  03/06/17 0710     Glucose 95 mg/dL       (H) mg/dL      Creatinine 2.21 (H) mg/dL      Sodium 137 mmol/L      Potassium 3.7 mmol/L      Chloride 103 mmol/L      CO2 28.0 mmol/L      Calcium 8.6 mg/dL      Total Protein 5.8 (L) g/dL      Albumin 3.00 (L) g/dL      ALT (SGPT) 97 (H) U/L      AST (SGOT) 37 U/L      Alkaline Phosphatase 95 U/L      Total Bilirubin 0.6 mg/dL      eGFR Non African Amer 29 (L) mL/min/1.73      Globulin 2.8 gm/dL      A/G Ratio 1.1 g/dL      BUN/Creatinine Ratio 46.6 (H)      Anion Gap 6.0 mmol/L     Narrative:       The MDRD GFR formula is only valid for adults with stable renal function between ages 18 and 70.    Protime-INR [[id number]]  (Abnormal) Collected:  03/06/17 0636    Specimen:  Blood Updated:  03/06/17 0724     Protime 35.7 (H) Seconds      INR 3.76 (H)     Narrative:       Therapeutic range for most indications is 2.0-3.0 INR,  or 2.5-3.5 for mechanical heart valves.    POC Glucose Fingerstick [[id number]]  (Abnormal) Collected:  03/06/17 1017    Specimen:  Blood Updated:  03/06/17 1033     Glucose 152 (H) mg/dL       RN NotifiedMeter: [id number]: [id number] EARL QUEZADA           Imaging Results (last 24 hours)     ** No results found for the last 24 hours. **          Medication Review:   Current Facility-Administered Medications   Medication Dose Route Frequency Provider Last Rate Last Dose   • acetaminophen (TYLENOL) tablet 650 mg  650 mg Oral  Q6H PRN Adriana Wells MD   650 mg at 03/05/17 1136   • albuterol (PROVENTIL HFA;VENTOLIN HFA) inhaler 2 puff  2 puff Inhalation Q4H PRN Adriana Wells MD       • albuterol (PROVENTIL) nebulizer solution 0.083% 2.5 mg/3mL  2.5 mg Nebulization Q4H PRN LONNIE Leon       • atorvastatin (LIPITOR) tablet 20 mg  20 mg Oral Daily Adriana Wells MD   20 mg at 03/06/17 0843   • budesonide (PULMICORT) nebulizer solution 0.5 mg  0.5 mg Nebulization BID - RT Zackary Anand MD   0.5 mg at 03/06/17 0743   • cholecalciferol (VITAMIN D3) tablet 1,000 Units  1,000 Units Oral Daily Adriana Wells MD   1,000 Units at 03/06/17 0842   • clopidogrel (PLAVIX) tablet 75 mg  75 mg Oral Daily Adriana Wells MD   75 mg at 03/06/17 0842   • dextrose (D50W) solution 25 g  25 g Intravenous Q15 Min PRN Adriana Wells MD       • dextrose (D50W) solution 50 mL  50 mL Intravenous Q1H PRN Stephani Bingham MD   50 mL at 02/22/17 0645   • dextrose (GLUTOSE) oral gel 15 g  15 g Oral Q15 Min PRN Adriana Wells MD       • diltiaZEM CD (CARDIZEM CD) 24 hr capsule 240 mg  240 mg Oral Daily Adriana Wells MD   240 mg at 03/06/17 0842   • escitalopram (LEXAPRO) tablet 10 mg  10 mg Oral Daily Adriana Wells MD   10 mg at 03/06/17 0843   • furosemide (LASIX) 100 mg, albumin human 25 % 100 mL 110 mL infusion  5 mL/hr Intravenous Continuous Richi Hayward MD 5 mL/hr at 03/06/17 1141 5 mL/hr at 03/06/17 1141   • glipiZIDE (GLUCOTROL) 24 hr tablet 2.5 mg  2.5 mg Oral Nightly Adriana Wells MD   2.5 mg at 03/05/17 2035   • glucagon (human recombinant) (GLUCAGEN DIAGNOSTIC) injection 1 mg  1 mg Subcutaneous Q15 Min PRN Adriana Wells MD       • hydrALAZINE (APRESOLINE) tablet 100 mg  100 mg Oral Q6H Sd Taylor MD   100 mg at 03/06/17 1751   • insulin aspart (novoLOG) injection 0-9 Units  0-9 Units Subcutaneous 4x Daily AC & at Bedtime Adriana Wells MD   4 Units at  03/06/17 1751   • insulin detemir (LEVEMIR) injection 15 Units  15 Units Subcutaneous Nightly Zackary Anand MD   15 Units at 03/05/17 2100   • ipratropium (ATROVENT) nebulizer solution 0.5 mg  0.5 mg Nebulization 4x Daily - RT Zackary Anand MD   0.5 mg at 03/06/17 1419   • isosorbide mononitrate (IMDUR) 24 hr tablet 60 mg  60 mg Oral Q24H Zackary Anand MD   60 mg at 03/06/17 0842   • LORazepam (ATIVAN) injection 0.5 mg  0.5 mg Intravenous Q4H PRN Jimbo Shelley MD   0.5 mg at 03/05/17 2350   • methylPREDNISolone sodium succinate (SOLU-Medrol) injection 20 mg  20 mg Intravenous Q12H Richi Hayward MD   20 mg at 03/06/17 1550   • pantoprazole (PROTONIX) EC tablet 40 mg  40 mg Oral Q AM Fabiana Wells MD   40 mg at 03/05/17 0847   • Pharmacy to dose warfarin   Does not apply Continuous PRN Zackary Anand MD       • phenol (CHLORASEPTIC) 1.4 % liquid 2 spray  2 spray Mouth/Throat Q2H PRN Fabiana Wells MD       • sodium chloride 0.9 % flush 10 mL  10 mL Intravenous PRN David Henderson MD   10 mL at 03/03/17 2125         Assessment/Plan     Mr. Henning unfortunately has multiple medical issues as described above and his prognosis is poor.  He has very poor respiratory reserve and has bilateral COPD, bilateral pleural effusions which have been recurrent, CK D with increasing BUN and creatinine.  His last echocardiogram within the week showed normal LV systolic function and normally functioning prosthetic valves.  He has chronic atrial fibrillation with a normally functioning pacemaker, but is unlikely to come back to sinus rhythm.  His left atrium was severely dilated.  We had a long discussion with the family about the multiple issues involved and the lack of progress.  The family will discuss future course of action.    Principal Problem:    CHF (congestive heart failure)  Active Problems:    Nonrheumatic aortic valve stenosis    Persistent atrial fibrillation    Congestive heart  failure    Aortic valve stenosis    Dyslipidemia    Essential hypertension    Kidney insufficiency    History of cerebrovascular accident    Tobacco dependence syndrome        Rubén Caro MD  03/06/17  6:23 PM

## 2017-03-07 NOTE — PLAN OF CARE
Problem: Patient Care Overview (Adult)  Goal: Plan of Care Review  Outcome: Ongoing (interventions implemented as appropriate)  Goal: Adult Individualization and Mutuality  Outcome: Ongoing (interventions implemented as appropriate)  Goal: Discharge Needs Assessment  Outcome: Ongoing (interventions implemented as appropriate)    Problem: Cardiac: Heart Failure (Adult)  Goal: Signs and Symptoms of Listed Potential Problems Will be Absent or Manageable (Cardiac: Heart Failure)  Outcome: Ongoing (interventions implemented as appropriate)    Problem: NPPV/CPAP (Adult)  Goal: Signs and Symptoms of Listed Potential Problems Will be Absent or Manageable (NPPV/CPAP)  Outcome: Ongoing (interventions implemented as appropriate)

## 2017-03-08 NOTE — CONSULTS
"Adult Nutrition  Assessment    Patient Name:  Kevin Henning  YOB: 1940  MRN: [id number]  Admit Date:  3/7/2017    Assessment Date:  3/8/2017          Reason for Assessment       03/08/17 1317    Reason for Assessment    Reason For Assessment/Visit identified at risk by screening criteria;education   dx CHF    Identified At Risk By Screening Criteria MST SCORE 2+                  Anthropometrics       03/08/17 1327    Anthropometrics    Height 165.1 cm (65\")    Weight 77.6 kg (171 lb 1.2 oz)    Ideal Body Weight (IBW)    Ideal Body Weight (IBW), Male (kg) 62.51    % Ideal Body Weight 124.4    Body Mass Index (BMI)    BMI (kg/m2) 28.53    BMI Grade 25 - 29.9 - overweight            Labs/Tests/Procedures/Meds       03/08/17 1328    Labs/Tests/Procedures/Meds    Labs/Tests Review Glucose;Creat;BUN;Na+;Hgb Hct    Medication Review Reviewed, pertinent              Estimated/Assessed Needs       03/08/17 1330    Calculation Measurements    Weight Used For Calculations 77.6 kg (171 lb 1.2 oz)    Height Used for Calculations 1.651 m (5' 5\")    Estimated/Assessed Energy Needs    Energy Need Method Bandera-St Jeor    Age 76    RMR (Bandera-St. Jeor Equation) 1427.88    Activity Factors (Bandera St Jeor)  Out of bed, ambulatory  1.3    Total estimated needs (Bandera St. Jeor) 1856    Estimated/Assessed Protein Needs    Weight Used for Protein Calculation 77.6 kg (171 lb 1.2 oz)    Protein (gm/kg) 1.2    1.2 Gm Protein (gm) 93.12    Estimated/Assessed Fluid Needs    Fluid Need Method --   1941            Nutrition Prescription Ordered       03/08/17 1332    Nutrition Prescription PO    Current PO Diet Regular    Common Modifiers Cardiac;Consistent Carbohydrate            Evaluation of Received Nutrient/Fluid Intake       03/08/17 1332    PO Evaluation    Number of Meals 12    % PO Intake 0% - 4x, 50% - 1x, 100% - 7x             Comments:  Pt resting. Daughter and Son in Law present.  Pt is a [facility] " Pt.  Family does not desire tube feeding at present.  Willing for pt to receive Glucerna and Magic Cups.  Intake 0% - 5x, 505 - 1x, 100% - 7x.  Meds and labs reviewed.  Mechanically altered diet requested.  Will add mechanical diet restriction.  Pt discussed with speech.  Will leave note for MD regarding puree diet.        Electronically signed by:  Lidia Ochoa RD  03/08/17 1:34 PM

## 2017-03-08 NOTE — PLAN OF CARE
Problem: Patient Care Overview (Adult)  Goal: Plan of Care Review  Outcome: Ongoing (interventions implemented as appropriate)    03/07/17 7601   Coping/Psychosocial Response Interventions   Plan Of Care Reviewed With patient   Patient Care Overview   Progress declining       Goal: Adult Individualization and Mutuality  Outcome: Ongoing (interventions implemented as appropriate)  Goal: Discharge Needs Assessment  Outcome: Ongoing (interventions implemented as appropriate)    Problem: Dying Patient, Actively (Adult)  Goal: Identify Related Risk Factors and Signs and Symptoms  Outcome: Outcome(s) achieved Date Met:  03/07/17  Goal: Comfort/Pain Control  Outcome: Ongoing (interventions implemented as appropriate)  Goal: Dying Process, Peace and Dignity  Outcome: Ongoing (interventions implemented as appropriate)

## 2017-03-08 NOTE — PLAN OF CARE
Problem: Patient Care Overview (Adult)  Goal: Plan of Care Review  Outcome: Ongoing (interventions implemented as appropriate)    Problem: Dying Patient, Actively (Adult)  Goal: Comfort/Pain Control  Outcome: Ongoing (interventions implemented as appropriate)  Goal: Dying Process, Peace and Dignity  Outcome: Ongoing (interventions implemented as appropriate)

## 2017-03-08 NOTE — DISCHARGE SUMMARY
[facility] Medicine Services  DEATH SUMMARY       Date of Admission: 3/7/2017  Date of Death:  3/8/2017 at approximatel  Primary Care Physician: Klaus Faria MD    Presenting Problem/History of Present Illness:  Renal failure [N19]     Final Death Diagnoses:  Hospital Problem List     Renal failure      volume overlaod  A fib     Consults:   Consults     Date and Time Order Name Status Description    2/21/2017 1854 Inpatient Consult to Nephrology Completed     2/21/2017 1854 Inpatient Consult to Cardiology Completed           Procedures Performed: none                 Pertinent Test Results:  Chest xray   Mild worsening of moderate right pleural effusion.  2. Small left pleural effusion which appears stable.  3. Bibasilar small patchy opacities suspicious for pulmonary  edema versus pneumonia versus subsegmental atelectasis.  Hospital Course:  76-year-old male with known history of aortic valve replacement for aortic stenosis and also left atrial enlargement does have increasing shortness of breath for past 2 weeks. His breathing is worse to the point where he is short of air even at rest and also cannot carry good conversation. Did not have any chest pain. Also having significant leg swelling bilaterally which has been getting worse. No leg pain. No lightheaded or dizziness. No palpitations. Symptoms are moderate to severe in nature progressively getting worse and has not been relieved with home medicines since. No fever or chills. No sore throat or coughing. No nausea, vomiting or abdominal pain. No tingling, numbness or weakness in upper extremity.  In ER he found to have congestive heart failure with pulmonary edema and bilateral pleural effusion associated with hypoxia and admitted for further evaluation and treatment plan.  The patient was diuresed aggressively but with no results . The echocardiogram showed an ejection fraction of 55% ,patient had a pleural effusion  but the cardiothoracic surgeon did not have anything to offer to him . It was thought that he has  bronchospasm and  Was treated for that also . However  Patient fluid overload  Did not improve even after starting a lasix drip .  We had a family meeting with his son ,harley cardiologist and myself and we agreed that all treatment seemed to be futile at this point  As he was very weak  And not improving . The dialysis option was considered but the patient was too weak  Also it was not sure that he would improve in the long term ,so the son spoke to the rest of the family and they agreed on hospice care at the hospital .  Patient was accepted  By the hospice agency and was started on morphine ,ativan and scopolamine .   He  on  .             Deny Araujo MD  17  5:03 PM    Time: 20min

## 2017-03-08 NOTE — PROGRESS NOTES
Hospitalist Progress Note  NAME: Kevin Henning  : 1940  MRN: [id number]     LOS: 1 day     PROVIDER OF SERVICE: Virginia Buck MD    Chief Complaint: CHF    Subjective:     Interval History:  History taken from: chart family RN  Patient Complaints: shortness of breath    Review of Systems:   Review of Systems  Constitutional: no weight loss, fever, chills, weakness  HEENT: no visual loss, blurred vision, double vision, yellow scalarea  Skin: no rash, no discoloration   CVS: no chest pain, palpitations  Chest: positive for shortness of breath   GI: no abdominal pain, blood in stool, no nausea, vomiting, diarrhea  : no burning in urination, change in odor, no change in frequency  Neuro: weakness  Musculoskeletal: generalized weakness  Lymphatics: no enlarged nodes  Endo: no reports of sweating, cold or heat intolerance, no polyuria  Objective:     Vital Signs  Temp:  [96.9 °F (36.1 °C)-97.9 °F (36.6 °C)] 96.9 °F (36.1 °C)  Heart Rate:  [67-84] 84  Resp:  [22-36] 36  BP: (145-164)/(64-72) 164/72    Physical Exam  Physical Exam  Constitutional: He is oriented to person, place, and time. He appears well-developed and well-nourished. Non-toxic appearance. He does not have a sickly appearance. He does not appear ill. No distress. He is not intubated.   HENT:   Head: Normocephalic and atraumatic.   Right Ear: External ear normal.   Left Ear: External ear normal.   Nose: Nose normal.   Mouth/Throat: Oropharynx is clear and moist. No oropharyngeal exudate.   Eyes: Conjunctivae and EOM are normal. Pupils are equal, round, and reactive to light. Right eye exhibits no discharge and no exudate. Left eye exhibits no discharge and no exudate. Right conjunctiva is not injected. Right conjunctiva has no hemorrhage. Left conjunctiva is not injected. Left conjunctiva has no hemorrhage. No scleral icterus.   Neck: Normal range of motion. Neck supple. No hepatojugular reflux and no JVD present. No tracheal tenderness,  no spinous process tenderness and no muscular tenderness present. Carotid bruit is not present. No rigidity. No tracheal deviation, no edema, no erythema and normal range of motion present. No thyroid mass and no thyromegaly present.   Cardiovascular: Normal rate, normal heart sounds and intact distal pulses. An irregular rhythm present. No extrasystoles are present. Exam reveals no gallop and no friction rub.   No murmur heard.  Pulmonary/Chest: Effort normal. No stridor. He is not intubated. No respiratory distress. He has no decreased breath sounds. He has no wheezes. He has no rhonchi. He has rales. He exhibits no tenderness.   Abdominal: Soft. Normal appearance and bowel sounds are normal. He exhibits no shifting dullness, no distension, no pulsatile liver, no fluid wave, no abdominal bruit, no ascites, no pulsatile midline mass and no mass. There is no hepatosplenomegaly, splenomegaly or hepatomegaly. There is no tenderness. There is no rigidity, no rebound, no guarding, no CVA tenderness, no tenderness at McBurney's point and negative Munoz's sign. No hernia.   Genitourinary: Rectal exam shows guaiac negative stool.   Musculoskeletal: Normal range of motion. He exhibits no edema, tenderness or deformity.   Right shoulder: He exhibits no swelling.     Vascular Status - His exam exhibits right foot edema. His exam exhibits left foot edema.  Skin Integrity - He hasno right foot ulcer, non-callous right foot, no right foot warmth and no right foot blister. He has no left foot ulcer, non-callous left foot, no left foot warmth and no left foot blister..  Lymphadenopathy:   He has no cervical adenopathy.   He has no axillary adenopathy.   Neurological: He is alert and oriented to person, place, and time. He has normal strength and normal reflexes. He is not disoriented. He displays no atrophy, no tremor and normal reflexes. No cranial nerve deficit or sensory deficit. He exhibits normal muscle tone. He displays no  seizure activity. Coordination and gait normal. He displays no Babinski's sign on the right side. He displays no Babinski's sign on the left side.   Skin: Skin is warm and dry. No abrasion, no bruising, no burn, no ecchymosis, no laceration, no lesion, no purpura and no rash noted. Rash is not macular, not papular, not maculopapular, not nodular, not pustular, not vesicular and not urticarial. He is not diaphoretic. No cyanosis or erythema. No pallor. Nails show no clubbing.   Psychiatric: Judgment and thought content normal. His mood appears not anxious. His affect is not angry, not blunt, not labile and not inappropriate. His speech is not slurred. He is not agitated, not aggressive, not hyperactive, not slowed, not withdrawn and not combative. Thought content is not paranoid and not delusional. Cognition and memory are not impaired. He does not express impulsivity or inappropriate judgment. He does not exhibit a depressed mood. He expresses no homicidal and no suicidal ideation. He expresses no suicidal plans and no homicidal plans. He is communicative. He exhibits normal recent memory and normal remote memory.     Medication Review    Current Facility-Administered Medications:   •  albuterol (PROVENTIL) nebulizer solution 0.083% 2.5 mg/3mL, 2.5 mg, Nebulization, Q4H PRN, Deny Araujo MD  •  atropine 1 % ophthalmic solution 2 drop, 2 drop, Sublingual, BID, Deny Araujo MD, 2 drop at 03/08/17 0855  •  budesonide (PULMICORT) nebulizer solution 0.5 mg, 0.5 mg, Nebulization, BID - RT, Deny Araujo MD, 0.5 mg at 03/07/17 1850  •  ipratropium (ATROVENT) nebulizer solution 0.5 mg, 0.5 mg, Nebulization, 4x Daily - RT, Deny Araujo MD, 0.5 mg at 03/07/17 1848  •  LORazepam (ATIVAN) 2 MG/ML concentrated solution 0.5 mg, 0.5 mg, Sublingual, Q8H, Deny Araujo MD, 0.5 mg at 03/08/17 0329  •  LORazepam (ATIVAN) injection 0.5 mg, 0.5 mg, Intravenous, Q2H PRN, Deny Araujo MD, 0.5 mg at 03/08/17 7064  •   Morphine 10 MG/5ML solution 2 mg, 2 mg, Oral, Q1H PRN, Deny Araujo MD  •  Morphine sulfate (PF) injection 4 mg, 4 mg, Intravenous, Q1H PRN, Deny Araujo MD, 4 mg at 03/08/17 0937  •  phenol (CHLORASEPTIC) 1.4 % liquid 2 spray, 2 spray, Mouth/Throat, Q2H PRN, Deny Araujo MD  •  [START ON 3/10/2017] Scopolamine (TRANSDERM-SCOP) 1.5 MG/3DAYS patch 1 patch, 1 patch, Transdermal, Q72H, Deny Araujo MD  •  sodium chloride 0.9 % flush 10 mL, 10 mL, Intravenous, PRN, Deny Araujo MD     Diagnostic Data    Lab Results (last 24 hours)     ** No results found for the last 24 hours. **            I reviewed the patient's new clinical results.    Assessment/Plan:     Active Hospital Problems (** Indicates Principal Problem)    Diagnosis Date Noted   • Renal failure [N19] 03/07/2017      Resolved Hospital Problems    Diagnosis Date Noted Date Resolved   No resolved problems to display.     -patient is now comfort measures, will continue to provide morphine and ativan as needed        This document has been electronically signed by Virginia Buck MD on March 8, 2017 10:47 AM

## 2017-03-10 LAB
GLUCOSE BLDC GLUCOMTR-MCNC: 232 MG/DL (ref 70–130)
GLUCOSE BLDC GLUCOMTR-MCNC: 243 MG/DL (ref 70–130)
GLUCOSE BLDC GLUCOMTR-MCNC: 279 MG/DL (ref 70–130)

## 2017-03-21 NOTE — DISCHARGE SUMMARY
[facility] Medicine Admission      Date of Admission: 2/21/2017      Primary Care Physician: Klaus Faria MD      Chief Complaint: failure to thrive     HPI:76-year-old male with known history of aortic valve replacement for aortic stenosis and also left atrial enlargement does have increasing shortness of breath for past 2 weeks. His breathing is worse to the point where he is short of air even at rest and also cannot carry good conversation. Did not have any chest pain. Also having significant leg swelling bilaterally which has been getting worse. No leg pain. No lightheaded or dizziness. No palpitations. Symptoms are moderate to severe in nature progressively getting worse and has not been relieved with home medicines since. No fever or chills. No sore throat or coughing. No nausea, vomiting or abdominal pain. No tingling, numbness or weakness in upper extremity.  In ER he found to have congestive heart failure with pulmonary edema and bilateral pleural effusion associated with hypoxia and admitted for further evaluation and treatment plan.  The patient was diuresed aggressively but with no results . The echocardiogram showed an ejection fraction of 55% ,patient had a pleural effusion but the cardiothoracic surgeon did not have anything to offer to him . It was thought that he has bronchospasm and Was treated for that also . However Patient fluid overload Did not improve even after starting a lasix drip .  We had a family meeting with his son ,harley cardiologist and myself and we agreed that all treatment seemed to be futile at this point As he was very weak And not improving . The dialysis option was considered but the patient was too weak Also it was not sure that he would improve in the long term ,so the son spoke to the rest of the family and they agreed on hospice care at the hospital .  Patient was accepted By the hospice agency and was started on morphine  ,ativan and scopolamine     Past Medical History:  has a past medical history of Aortic valve stenosis; Arrhythmia; CHF (congestive heart failure); Dizziness; Dyslipidemia; Electrocardiogram abnormal; Essential hypertension; History of cerebrovascular accident; Kidney insufficiency; Malignant neoplasm of skin; Pneumonia; PVD (peripheral vascular disease); and Tobacco dependence syndrome.    Past Surgical History:  has a past surgical history that includes Cardiac catheterization (06/21/2016); Cardiac pacemaker placement (07/28/2016); transesophageal echocardiogram (brandy) (09/30/2008); Mandible fracture surgery (12/22/1979); Vascular surgery (11/21/2008); Coronary artery bypass graft; and Pacemaker Implantation.    Family History: Family history is unknown by patient.    Social History:  reports that he has been smoking.  He has been smoking about 0.20 packs per day. He has never used smokeless tobacco. He reports that he does not drink alcohol or use illicit drugs.    Allergies:   Allergies   Allergen Reactions   • Penicillins    • Streptomycin        Medications: Scheduled Meds:  Continuous Infusions:  No current facility-administered medications for this encounter.   PRN Meds:.    Review of Systems   Unable to perform ROS: Patient unresponsive   Constitutional: Negative for activity change, appetite change, chills, diaphoresis, fatigue, fever and unexpected weight change.   HENT: Negative.  Negative for congestion, dental problem, drooling, ear discharge, ear pain, facial swelling, hearing loss, mouth sores, nosebleeds, postnasal drip, rhinorrhea, sinus pressure, sneezing, tinnitus, trouble swallowing and voice change.    Eyes: Negative for photophobia and discharge.   Respiratory: Negative for apnea, cough, choking, shortness of breath, wheezing and stridor.    Cardiovascular: Negative for chest pain, palpitations and leg swelling.   Gastrointestinal: Negative for abdominal pain, anal bleeding, blood in stool,  constipation, diarrhea, nausea, rectal pain and vomiting.   Endocrine: Negative for cold intolerance, heat intolerance, polydipsia, polyphagia and polyuria.   Genitourinary: Negative for dysuria, enuresis, frequency, hematuria and urgency.   Musculoskeletal: Negative for arthralgias, back pain, joint swelling, myalgias, neck pain and neck stiffness.   Skin: Negative for color change, pallor, rash and wound.   Allergic/Immunologic: Negative for food allergies and immunocompromised state.   Neurological: Negative for dizziness, tremors, seizures, syncope, facial asymmetry, speech difficulty, weakness, light-headedness, numbness and headaches.   Hematological: Negative for adenopathy.   Psychiatric/Behavioral: Negative for agitation, behavioral problems, confusion, hallucinations, sleep disturbance and suicidal ideas. The patient is not nervous/anxious.      Otherwise complete ROS is negative except as mentioned above.    Physical Exam:      Physical Exam   Constitutional: He is oriented to person, place, and time. He appears lethargic. He appears cachectic.  Non-toxic appearance. He does not have a sickly appearance. He appears ill. No distress. He is not intubated.   HENT:   Head: Normocephalic and atraumatic.   Right Ear: External ear normal.   Left Ear: External ear normal.   Nose: Nose normal.   Mouth/Throat: Oropharynx is clear and moist. No oropharyngeal exudate.   Eyes: Conjunctivae and EOM are normal. Pupils are equal, round, and reactive to light. Right eye exhibits no discharge and no exudate. Left eye exhibits no discharge and no exudate. Right conjunctiva is not injected. Right conjunctiva has no hemorrhage. Left conjunctiva is not injected. Left conjunctiva has no hemorrhage. No scleral icterus.   Neck: Normal range of motion. Neck supple. No hepatojugular reflux and no JVD present. No tracheal tenderness, no spinous process tenderness and no muscular tenderness present. Carotid bruit is not present. No  rigidity. No tracheal deviation, no edema, no erythema and normal range of motion present. No thyroid mass and no thyromegaly present.   Cardiovascular: Normal rate, regular rhythm, normal heart sounds and intact distal pulses.   No extrasystoles are present. Exam reveals no gallop and no friction rub.    No murmur heard.  Pulmonary/Chest: Effort normal. No stridor. He is not intubated. No respiratory distress. He has decreased breath sounds. He has no wheezes. He has no rhonchi. He has rales. He exhibits no tenderness.   Abdominal: Soft. Normal appearance and bowel sounds are normal. He exhibits no shifting dullness, no distension, no pulsatile liver, no fluid wave, no abdominal bruit, no ascites, no pulsatile midline mass and no mass. There is no hepatosplenomegaly, splenomegaly or hepatomegaly. There is no tenderness. There is no rigidity, no rebound, no guarding, no CVA tenderness, no tenderness at McBurney's point and negative Munoz's sign. No hernia.   Genitourinary: Rectal exam shows guaiac negative stool.   Musculoskeletal: Normal range of motion. He exhibits no edema, tenderness or deformity.        Right shoulder: He exhibits no swelling.       Vascular Status -  His exam exhibits right foot edema. His exam exhibits left foot edema.   Skin Integrity  -   He hasno right foot ulcer, non-callous right foot, no right foot warmth and no right foot blister. He has no left foot ulcer, non-callous left foot, no left foot warmth and no left foot blister..  Lymphadenopathy:     He has no cervical adenopathy.     He has no axillary adenopathy.   Neurological: He is oriented to person, place, and time. He has normal strength and normal reflexes. He appears lethargic. He is not disoriented. He displays no atrophy, no tremor and normal reflexes. No cranial nerve deficit or sensory deficit. He exhibits normal muscle tone. He displays no seizure activity. Coordination and gait normal. He displays no Babinski's sign on  the right side. He displays no Babinski's sign on the left side.   Skin: Skin is warm. No abrasion, no bruising, no burn, no ecchymosis, no laceration, no lesion, no purpura and no rash noted. Rash is not macular, not papular, not maculopapular, not nodular, not pustular, not vesicular and not urticarial. He is diaphoretic. No cyanosis or erythema. No pallor. Nails show no clubbing.   Psychiatric: Judgment and thought content normal. His mood appears not anxious. His affect is not angry, not blunt, not labile and not inappropriate. His speech is not slurred. He is not agitated, not aggressive, not hyperactive, not slowed, not withdrawn and not combative. Thought content is not paranoid and not delusional. Cognition and memory are not impaired. He does not express impulsivity or inappropriate judgment. He does not exhibit a depressed mood. He expresses no homicidal and no suicidal ideation. He expresses no suicidal plans and no homicidal plans. He is communicative. He exhibits normal recent memory and normal remote memory.         Results Reviewed:  I have personally reviewed current lab, radiology, and data and agree with results.  Lab Results (last 24 hours)     Procedure Component Value Units Date/Time    POC Glucose Fingerstick [[id number]]  (Abnormal) Collected:  03/06/17 2040    Specimen:  Blood Updated:  03/06/17 2107     Glucose 237 (H) mg/dL       RN NotifiedMeter: [id number]: [id number] DELON JOYCE       CBC & Differential [[id number]] Collected:  03/07/17 0554    Specimen:  Blood Updated:  03/07/17 0611    Narrative:       The following orders were created for panel order CBC & Differential.  Procedure                               Abnormality         Status                     ---------                               -----------         ------                     CBC Auto Differential[[id number]]         Abnormal            Final result                 Please view results for these tests on the  individual orders.    CBC Auto Differential [[id number]]  (Abnormal) Collected:  03/07/17 0554    Specimen:  Blood Updated:  03/07/17 0611     WBC 9.33 10*3/mm3      RBC 4.26 (L) 10*6/mm3      Hemoglobin 11.6 (L) g/dL      Hematocrit 34.7 (L) %      MCV 81.5 fL      MCH 27.2 pg      MCHC 33.4 g/dL      RDW 18.1 (H) %      RDW-SD 54.3 (H) fl      MPV 11.3 fL      Platelets 152 10*3/mm3      Neutrophil % 91.0 (H) %      Lymphocyte % 5.4 (L) %      Monocyte % 3.2 %      Eosinophil % 0.1 %      Basophil % 0.0 %      Immature Grans % 0.3 %      Neutrophils, Absolute 8.49 10*3/mm3      Lymphocytes, Absolute 0.50 (L) 10*3/mm3      Monocytes, Absolute 0.30 10*3/mm3      Eosinophils, Absolute 0.01 10*3/mm3      Basophils, Absolute 0.00 10*3/mm3      Immature Grans, Absolute 0.03 (H) 10*3/mm3     Magnesium [[id number]]  (Abnormal) Collected:  03/07/17 0555    Specimen:  Blood Updated:  03/07/17 0627     Magnesium 2.5 (H) mg/dL     Comprehensive Metabolic Panel [[id number]]  (Abnormal) Collected:  03/07/17 0555    Specimen:  Blood Updated:  03/07/17 0629     Glucose 277 (H) mg/dL       (H) mg/dL      Creatinine 2.16 (H) mg/dL      Sodium 136 (L) mmol/L      Potassium 3.7 mmol/L      Chloride 100 mmol/L      CO2 27.0 mmol/L      Calcium 8.9 mg/dL      Total Protein 6.1 (L) g/dL      Albumin 3.40 g/dL      ALT (SGPT) 79 (H) U/L      AST (SGOT) 37 U/L      Alkaline Phosphatase 94 U/L      Total Bilirubin 0.8 mg/dL      eGFR Non African Amer 30 (L) mL/min/1.73      Globulin 2.7 gm/dL      A/G Ratio 1.3 g/dL      BUN/Creatinine Ratio 46.3 (H)      Anion Gap 9.0 mmol/L     Narrative:       The MDRD GFR formula is only valid for adults with stable renal function between ages 18 and 70.    Protime-INR [[id number]]  (Abnormal) Collected:  03/07/17 0555    Specimen:  Blood Updated:  03/07/17 0640     Protime 32.8 (H) Seconds      INR 3.36 (H)     Narrative:       Therapeutic range for most indications is 2.0-3.0 INR,  or 2.5-3.5 for  mechanical heart valves.    POC Glucose Fingerstick [[id number]]  (Abnormal) Collected:  03/07/17 0636    Specimen:  Blood Updated:  03/07/17 0650     Glucose 279 (H) mg/dL       RN NotifiedMeter: [id number]: [id number] DELON JOYCE       POC Glucose Fingerstick [[id number]]  (Abnormal) Collected:  03/07/17 1013    Specimen:  Blood Updated:  03/07/17 1030     Glucose 327 (H) mg/dL       RN NotifiedMeter: [id number]: [id number] ARMIDA REYES           Imaging Results (last 24 hours)     ** No results found for the last 24 hours. **            Assessment:    Hospital Problem List     * (Principal)CHF (congestive heart failure)    Nonrheumatic aortic valve stenosis    Overview Signed 2/1/2017  9:32 AM by Rubén Caro MD     S/p AVR           Persistent atrial fibrillation    Congestive heart failure    Aortic valve stenosis    Dyslipidemia    Essential hypertension    Kidney insufficiency    History of cerebrovascular accident    Overview Signed 2/27/2017  1:25 PM by Fabiana Wells MD     multiple         Tobacco dependence syndrome      failure to improve   End of life           Plan:hospice care  Will follow hospice agency protocol     I discussed the patients findings and my recommendations with: family     Deny Araujo MD  03/20/17  9:12 PM

## 2017-04-08 NOTE — PROGRESS NOTES
1/30/2017    Kevin Henning  1940      Chief Complaint:  Aortic Stenosis, AVR  HPI:      PCP:  Klaus Faria MD  Cardiology:  Dr Caro    76 y.o. male with critical AS requiring AVR, CKD stage 3, HTN, DM II requiring insulin, CVA (2009, and PVD (post stenting)., ..  former smoker.  He had a prolonged recovery period during which he underwent hemodialysis for EDUARDO/ renal failure with recovery.  Arrhythmias persistent bradycardia requiring pacemaker, at fib requiring cardioversion.  He had a prolonged stay on LTC and then TCU.  Slow, steady progress.  Shortness of breath front door to car.  Rest 10min then ok.  Worse than before operation.    7/20/16  AVR 25mm Mosaic Porcine moder #[id number]  Thoracic aorta endarterectomy  7/28/16  BI V Pacer insertion. [employer], model number [id number], serial #[id number]. The atrial lead was model 2088TC/46, serial number [id number] and ventricular lead model number was 2088TC/52, serial number [id number]. The atrial lead sensing was 2.1 mV. Impedance was 460 ohms. The threshold was 0.75 V at 0.5 milliseconds. The ventricular lead sensing was 9.2 mV. Impedance was 480 ohms. The threshold was 0.5 V at 0.5 milliseconds. The pacing mode was DDDR, rate .  7/29/16  Hospital DC to LTC  8/24/16 Cardioversion (afib)  8/29/16  Thoracentesis R (bilateral pleural effusions)  Hospital acquired pneumonia   9/12/16  DC from LTC Admitted to TCU  10/03/16  Thoracentesis R (1500)_  10/12/16  TCU dc   10/27/2016 CXR small RIGHT effusion, improving.  hgb 13, plt 239, LFT ok.  ECG , QTc 446  1/19/2017 Echocardiogram:  EF 60% LA 53, LV 43, RVSP 60mmHg.  AV well seated no paravalvular leak.  Small pleural effusion.  1/30/2017 CXR:  Moderate LEFT pleural effuison, small RIGHT pleural effusion.    Recent images independently reviewed.  Available laboratory values reviewed.    PMH:  Past Medical History:   Diagnosis Date   • Aortic valve stenosis    • Arrhythmia    • CHF (congestive  heart failure)    • Dizziness    • Dyslipidemia    • Electrocardiogram abnormal    • Essential hypertension    • History of cerebrovascular accident     multiple   • Kidney insufficiency    • Malignant neoplasm of skin     history of basal cell carcinoma   • Pneumonia    • PVD (peripheral vascular disease)    • Tobacco dependence syndrome        ALLERGIES:  Allergies   Allergen Reactions   • Penicillins    • Streptomycin          MEDICATIONS:    Current Outpatient Prescriptions:   •  Cholecalciferol (VITAMIN D-3) 1000 UNITS capsule, Take 1,000 Units by mouth Daily., Disp: , Rfl:   •  clopidogrel (PLAVIX) 75 MG tablet, Take 75 mg by mouth daily., Disp: , Rfl:   •  escitalopram (LEXAPRO) 10 MG tablet, Take 1 tablet by mouth Daily., Disp: , Rfl: 0  •  furosemide (LASIX) 40 MG tablet, Take 1 tablet by mouth Daily., Disp: , Rfl: 0  •  glipiZIDE (GLUCOTROL) 2.5 MG 24 hr tablet, Take 1 tablet by mouth Every Night., Disp: , Rfl: 6  •  LEVEMIR FLEXTOUCH 100 UNIT/ML injection, Inject 12 Units under the skin Daily., Disp: , Rfl: 1  •  VENTOLIN  (90 BASE) MCG/ACT inhaler, Apply 2 puffs to the mouth or throat Every 4 (Four) Hours As Needed., Disp: , Rfl: 5  •  diltiaZEM CD (CARTIA XT) 240 MG 24 hr capsule, Take 1 capsule by mouth Daily., Disp: 30 capsule, Rfl: 6  •  hydrALAZINE (APRESOLINE) 25 MG tablet, Take 25 mg by mouth Every Night., Disp: , Rfl:   •  simvastatin (ZOCOR) 40 MG tablet, Take 0.5 tablets by mouth Every Night., Disp: 30 tablet, Rfl: 11  •  warfarin (COUMADIN) 1 MG tablet, Take 1 tablet by mouth Daily., Disp: 30 tablet, Rfl: 6    Review of Systems   Constitutional: Negative for appetite change and fatigue.   Respiratory: Negative for cough, shortness of breath and wheezing.    Cardiovascular: Negative for chest pain, palpitations and leg swelling.   Musculoskeletal: Negative for arthralgias, back pain and neck pain, Leg pain.   Skin: Negative for color change and rash.   Neurological: Negative for  dizziness, seizures, syncope, numbness and headaches.   Hematological: Negative for adenopathy. Does not bruise/bleed easily.   Psychiatric/Behavioral: Negative for confusion and hallucinations.  not nervous/anxious.      Physical Exam   Constitutional: oriented to person, place, and time. Appears healthy, looks well, no acute distress.   Neck: No tracheal deviation present. No thyromegaly present.   Cardiovascular: Normal rate and regular rhythm. 2/6  Murmur   Pulses:       Carotid pulses are present on the right side with soft bruit, and on the left side with soft bruit.       Radial pulses are 2+ on the right side, and 2+ on the left side.        Dorsalis pedis pulses are 2+ on the right side, and 2+ on the left side.        Posterior tibial pulses are 2+ on the right side, and 2+ on the left side.   Pulmonary/Chest: No respiratory distress. no wheezes.  no rhonchi. decrease breath sounds bases.  Musculoskeletal: Normal range of motion. Gait normal.  Neurological: alert and oriented to person, place, and time. He has normal strength.   Skin: Skin is warm and dry. No cyanosis or erythema. No pallor.   Psychiatric:  normal mood and affect. Judgment and thought content normal.   Results for KEVIN HENNING (MRN [id number]) as of 10/30/2016 17:43   Ref. Range 10/27/2016 09:31   Creatinine Latest Ref Range: 0.7 - 1.3 mg/dl 1.6 (H)   BUN Latest Ref Range: 7 - 21 mg/dl 33 (H)     ASSESSMENT:  Kevin was seen today for cardiac valve problem.    Diagnoses and all orders for this visit:    Nonrheumatic aortic valve stenosis    PVD (peripheral vascular disease)    Benign essential HTN    Persistent atrial fibrillation    Chronic diastolic congestive heart failure    Essential hypertension    Controlled type 2 diabetes mellitus with stage 3 chronic kidney disease, without long-term current use of insulin    CKD stage 3 due to type 2 diabetes mellitus    PLAN:  Detailed discussion with Mr Henning regarding situation and  options.  Long recovery from AVR.  Risks, benefits discussed.  Understands and wishes to proceed with plan.    Return in 1 month with CXR.  Keep appt Dr Caro 2/1, acute on chronic CHF.  Continue cardiac rehab.  Recommended regular physical activity.  Continue current medications as directed.    Thank you for the opportunity to participate in this patient's care.    Copy to primary care provider.    EMR Dragon/Transcription disclaimer:   Much of this encounter note is an electronic transcription/translation of spoken language to printed text. The electronic translation of spoken language may permit erroneous, or at times, nonsensical words or phrases to be inadvertently transcribed; Although I have reviewed the note for such errors, some may still exist.